# Patient Record
Sex: FEMALE | Race: WHITE | NOT HISPANIC OR LATINO | Employment: PART TIME | URBAN - METROPOLITAN AREA
[De-identification: names, ages, dates, MRNs, and addresses within clinical notes are randomized per-mention and may not be internally consistent; named-entity substitution may affect disease eponyms.]

---

## 2017-05-10 ENCOUNTER — GENERIC CONVERSION - ENCOUNTER (OUTPATIENT)
Dept: OTHER | Facility: OTHER | Age: 62
End: 2017-05-10

## 2017-10-20 ENCOUNTER — GENERIC CONVERSION - ENCOUNTER (OUTPATIENT)
Dept: OTHER | Facility: OTHER | Age: 62
End: 2017-10-20

## 2017-10-20 DIAGNOSIS — E03.9 HYPOTHYROIDISM: ICD-10-CM

## 2018-01-09 ENCOUNTER — TRANSCRIBE ORDERS (OUTPATIENT)
Dept: ADMINISTRATIVE | Facility: HOSPITAL | Age: 63
End: 2018-01-09

## 2018-01-09 ENCOUNTER — GENERIC CONVERSION - ENCOUNTER (OUTPATIENT)
Dept: OTHER | Facility: OTHER | Age: 63
End: 2018-01-09

## 2018-01-09 ENCOUNTER — APPOINTMENT (OUTPATIENT)
Dept: LAB | Facility: CLINIC | Age: 63
End: 2018-01-09
Payer: COMMERCIAL

## 2018-01-09 DIAGNOSIS — I51.9 MYXEDEMA HEART DISEASE: Primary | ICD-10-CM

## 2018-01-09 DIAGNOSIS — E03.9 MYXEDEMA HEART DISEASE: ICD-10-CM

## 2018-01-09 DIAGNOSIS — E03.9 MYXEDEMA HEART DISEASE: Primary | ICD-10-CM

## 2018-01-09 DIAGNOSIS — I51.9 MYXEDEMA HEART DISEASE: ICD-10-CM

## 2018-01-09 DIAGNOSIS — E03.9 HYPOTHYROIDISM: ICD-10-CM

## 2018-01-09 LAB
ALBUMIN SERPL BCP-MCNC: 3.9 G/DL (ref 3.5–5)
ALP SERPL-CCNC: 85 U/L (ref 46–116)
ALT SERPL W P-5'-P-CCNC: 15 U/L (ref 12–78)
ANION GAP SERPL CALCULATED.3IONS-SCNC: 9 MMOL/L (ref 4–13)
AST SERPL W P-5'-P-CCNC: 13 U/L (ref 5–45)
BILIRUB SERPL-MCNC: 0.5 MG/DL (ref 0.2–1)
BUN SERPL-MCNC: 16 MG/DL (ref 5–25)
CALCIUM SERPL-MCNC: 9.1 MG/DL (ref 8.3–10.1)
CHLORIDE SERPL-SCNC: 107 MMOL/L (ref 100–108)
CO2 SERPL-SCNC: 24 MMOL/L (ref 21–32)
CREAT SERPL-MCNC: 0.76 MG/DL (ref 0.6–1.3)
ERYTHROCYTE [DISTWIDTH] IN BLOOD BY AUTOMATED COUNT: 12.9 % (ref 11.6–15.1)
GFR SERPL CREATININE-BSD FRML MDRD: 86 ML/MIN/1.73SQ M
GLUCOSE P FAST SERPL-MCNC: 112 MG/DL (ref 65–99)
HCT VFR BLD AUTO: 39.7 % (ref 34.8–46.1)
HGB BLD-MCNC: 13.9 G/DL (ref 11.5–15.4)
MCH RBC QN AUTO: 33.3 PG (ref 26.8–34.3)
MCHC RBC AUTO-ENTMCNC: 35 G/DL (ref 31.4–37.4)
MCV RBC AUTO: 95 FL (ref 82–98)
PLATELET # BLD AUTO: 261 THOUSANDS/UL (ref 149–390)
PMV BLD AUTO: 10.2 FL (ref 8.9–12.7)
POTASSIUM SERPL-SCNC: 3.8 MMOL/L (ref 3.5–5.3)
PROT SERPL-MCNC: 7.5 G/DL (ref 6.4–8.2)
RBC # BLD AUTO: 4.17 MILLION/UL (ref 3.81–5.12)
SODIUM SERPL-SCNC: 140 MMOL/L (ref 136–145)
T3FREE SERPL-MCNC: 2.64 PG/ML (ref 2.3–4.2)
T4 FREE SERPL-MCNC: 0.83 NG/DL (ref 0.76–1.46)
T4 SERPL-MCNC: 7.7 UG/DL (ref 4.7–13.3)
TSH SERPL DL<=0.05 MIU/L-ACNC: 2.15 UIU/ML (ref 0.36–3.74)
WBC # BLD AUTO: 7.35 THOUSAND/UL (ref 4.31–10.16)

## 2018-01-09 PROCEDURE — 84439 ASSAY OF FREE THYROXINE: CPT

## 2018-01-09 PROCEDURE — 84480 ASSAY TRIIODOTHYRONINE (T3): CPT

## 2018-01-09 PROCEDURE — 84481 FREE ASSAY (FT-3): CPT

## 2018-01-09 PROCEDURE — 36415 COLL VENOUS BLD VENIPUNCTURE: CPT

## 2018-01-09 PROCEDURE — 85027 COMPLETE CBC AUTOMATED: CPT

## 2018-01-09 PROCEDURE — 84443 ASSAY THYROID STIM HORMONE: CPT

## 2018-01-09 PROCEDURE — 80053 COMPREHEN METABOLIC PANEL: CPT

## 2018-01-10 ENCOUNTER — GENERIC CONVERSION - ENCOUNTER (OUTPATIENT)
Dept: OTHER | Facility: OTHER | Age: 63
End: 2018-01-10

## 2018-01-10 LAB — T3 SERPL-MCNC: 0.9 NG/ML (ref 0.6–1.8)

## 2018-01-22 VITALS
HEART RATE: 83 BPM | HEIGHT: 63 IN | DIASTOLIC BLOOD PRESSURE: 74 MMHG | RESPIRATION RATE: 16 BRPM | WEIGHT: 112 LBS | BODY MASS INDEX: 19.84 KG/M2 | SYSTOLIC BLOOD PRESSURE: 120 MMHG | OXYGEN SATURATION: 97 %

## 2018-01-24 VITALS
WEIGHT: 117 LBS | DIASTOLIC BLOOD PRESSURE: 60 MMHG | SYSTOLIC BLOOD PRESSURE: 110 MMHG | BODY MASS INDEX: 20.73 KG/M2 | HEIGHT: 63 IN

## 2018-02-15 ENCOUNTER — OFFICE VISIT (OUTPATIENT)
Dept: URGENT CARE | Facility: CLINIC | Age: 63
End: 2018-02-15
Payer: COMMERCIAL

## 2018-02-15 ENCOUNTER — HOSPITAL ENCOUNTER (INPATIENT)
Facility: HOSPITAL | Age: 63
LOS: 4 days | Discharge: NURSING, REHAB OR LONG-TERM CARE | DRG: 536 | End: 2018-02-20
Attending: EMERGENCY MEDICINE | Admitting: INTERNAL MEDICINE
Payer: COMMERCIAL

## 2018-02-15 ENCOUNTER — APPOINTMENT (OUTPATIENT)
Dept: RADIOLOGY | Facility: CLINIC | Age: 63
End: 2018-02-15
Payer: COMMERCIAL

## 2018-02-15 ENCOUNTER — APPOINTMENT (EMERGENCY)
Dept: CT IMAGING | Facility: HOSPITAL | Age: 63
DRG: 536 | End: 2018-02-15
Payer: COMMERCIAL

## 2018-02-15 VITALS
SYSTOLIC BLOOD PRESSURE: 130 MMHG | WEIGHT: 114 LBS | OXYGEN SATURATION: 98 % | HEART RATE: 92 BPM | RESPIRATION RATE: 20 BRPM | BODY MASS INDEX: 18.32 KG/M2 | HEIGHT: 66 IN | TEMPERATURE: 97.9 F | DIASTOLIC BLOOD PRESSURE: 70 MMHG

## 2018-02-15 DIAGNOSIS — S32.691A: ICD-10-CM

## 2018-02-15 DIAGNOSIS — S32.9XXA PELVIC FRACTURE (HCC): Primary | ICD-10-CM

## 2018-02-15 DIAGNOSIS — M25.559 ARTHRALGIA OF HIP, UNSPECIFIED LATERALITY: Primary | ICD-10-CM

## 2018-02-15 DIAGNOSIS — R35.0 URINARY FREQUENCY: ICD-10-CM

## 2018-02-15 DIAGNOSIS — F32.A DEPRESSION: ICD-10-CM

## 2018-02-15 DIAGNOSIS — K59.00 CONSTIPATION: ICD-10-CM

## 2018-02-15 DIAGNOSIS — M25.559 ARTHRALGIA OF HIP, UNSPECIFIED LATERALITY: ICD-10-CM

## 2018-02-15 DIAGNOSIS — S32.591D CLOSED FRACTURE OF MULTIPLE RAMI OF RIGHT PUBIS WITH ROUTINE HEALING, SUBSEQUENT ENCOUNTER: ICD-10-CM

## 2018-02-15 PROBLEM — S32.599A FRACTURE OF MULTIPLE PUBIC RAMI (HCC): Status: ACTIVE | Noted: 2018-02-15

## 2018-02-15 PROBLEM — E03.9 HYPOTHYROID: Status: ACTIVE | Noted: 2018-02-15

## 2018-02-15 PROBLEM — Z85.3 HX OF BREAST CANCER: Status: ACTIVE | Noted: 2018-02-15

## 2018-02-15 LAB
ANION GAP SERPL CALCULATED.3IONS-SCNC: 8 MMOL/L (ref 4–13)
APTT PPP: 30 SECONDS (ref 23–35)
BASOPHILS # BLD AUTO: 0.03 THOUSANDS/ΜL (ref 0–0.1)
BASOPHILS NFR BLD AUTO: 0 % (ref 0–1)
BUN SERPL-MCNC: 13 MG/DL (ref 5–25)
CALCIUM SERPL-MCNC: 8.9 MG/DL (ref 8.3–10.1)
CHLORIDE SERPL-SCNC: 104 MMOL/L (ref 100–108)
CO2 SERPL-SCNC: 27 MMOL/L (ref 21–32)
CREAT SERPL-MCNC: 0.67 MG/DL (ref 0.6–1.3)
EOSINOPHIL # BLD AUTO: 0.06 THOUSAND/ΜL (ref 0–0.61)
EOSINOPHIL NFR BLD AUTO: 1 % (ref 0–6)
ERYTHROCYTE [DISTWIDTH] IN BLOOD BY AUTOMATED COUNT: 12.9 % (ref 11.6–15.1)
GFR SERPL CREATININE-BSD FRML MDRD: 96 ML/MIN/1.73SQ M
GLUCOSE SERPL-MCNC: 77 MG/DL (ref 65–140)
HCT VFR BLD AUTO: 40.2 % (ref 34.8–46.1)
HGB BLD-MCNC: 14 G/DL (ref 11.5–15.4)
INR PPP: 1 (ref 0.86–1.16)
LYMPHOCYTES # BLD AUTO: 1.56 THOUSANDS/ΜL (ref 0.6–4.47)
LYMPHOCYTES NFR BLD AUTO: 14 % (ref 14–44)
MCH RBC QN AUTO: 33.7 PG (ref 26.8–34.3)
MCHC RBC AUTO-ENTMCNC: 34.8 G/DL (ref 31.4–37.4)
MCV RBC AUTO: 97 FL (ref 82–98)
MONOCYTES # BLD AUTO: 0.78 THOUSAND/ΜL (ref 0.17–1.22)
MONOCYTES NFR BLD AUTO: 7 % (ref 4–12)
NEUTROPHILS # BLD AUTO: 8.58 THOUSANDS/ΜL (ref 1.85–7.62)
NEUTS SEG NFR BLD AUTO: 78 % (ref 43–75)
NRBC BLD AUTO-RTO: 0 /100 WBCS
PLATELET # BLD AUTO: 203 THOUSANDS/UL (ref 149–390)
PMV BLD AUTO: 10.2 FL (ref 8.9–12.7)
POTASSIUM SERPL-SCNC: 4.1 MMOL/L (ref 3.5–5.3)
PROTHROMBIN TIME: 13.2 SECONDS (ref 12.1–14.4)
RBC # BLD AUTO: 4.16 MILLION/UL (ref 3.81–5.12)
SODIUM SERPL-SCNC: 139 MMOL/L (ref 136–145)
WBC # BLD AUTO: 11.01 THOUSAND/UL (ref 4.31–10.16)

## 2018-02-15 PROCEDURE — 36415 COLL VENOUS BLD VENIPUNCTURE: CPT | Performed by: EMERGENCY MEDICINE

## 2018-02-15 PROCEDURE — 80048 BASIC METABOLIC PNL TOTAL CA: CPT | Performed by: EMERGENCY MEDICINE

## 2018-02-15 PROCEDURE — 99203 OFFICE O/P NEW LOW 30 MIN: CPT | Performed by: PHYSICIAN ASSISTANT

## 2018-02-15 PROCEDURE — 99285 EMERGENCY DEPT VISIT HI MDM: CPT

## 2018-02-15 PROCEDURE — 96374 THER/PROPH/DIAG INJ IV PUSH: CPT

## 2018-02-15 PROCEDURE — 99220 PR INITIAL OBSERVATION CARE/DAY 70 MINUTES: CPT | Performed by: PHYSICIAN ASSISTANT

## 2018-02-15 PROCEDURE — 72192 CT PELVIS W/O DYE: CPT

## 2018-02-15 PROCEDURE — 96375 TX/PRO/DX INJ NEW DRUG ADDON: CPT

## 2018-02-15 PROCEDURE — 85730 THROMBOPLASTIN TIME PARTIAL: CPT | Performed by: EMERGENCY MEDICINE

## 2018-02-15 PROCEDURE — S9088 SERVICES PROVIDED IN URGENT: HCPCS | Performed by: PHYSICIAN ASSISTANT

## 2018-02-15 PROCEDURE — 85610 PROTHROMBIN TIME: CPT | Performed by: EMERGENCY MEDICINE

## 2018-02-15 PROCEDURE — 73502 X-RAY EXAM HIP UNI 2-3 VIEWS: CPT

## 2018-02-15 PROCEDURE — 85025 COMPLETE CBC W/AUTO DIFF WBC: CPT | Performed by: EMERGENCY MEDICINE

## 2018-02-15 RX ORDER — DIAZEPAM 5 MG/1
5 TABLET ORAL EVERY 12 HOURS PRN
COMMUNITY
End: 2018-04-03 | Stop reason: SDUPTHER

## 2018-02-15 RX ORDER — LIOTHYRONINE SODIUM 5 UG/1
5 TABLET ORAL
Status: DISCONTINUED | OUTPATIENT
Start: 2018-02-16 | End: 2018-02-20 | Stop reason: HOSPADM

## 2018-02-15 RX ORDER — DIAZEPAM 5 MG/1
5 TABLET ORAL EVERY 12 HOURS PRN
Status: DISCONTINUED | OUTPATIENT
Start: 2018-02-15 | End: 2018-02-20 | Stop reason: HOSPADM

## 2018-02-15 RX ORDER — ACETAMINOPHEN 325 MG/1
650 TABLET ORAL EVERY 6 HOURS PRN
Status: DISCONTINUED | OUTPATIENT
Start: 2018-02-15 | End: 2018-02-20 | Stop reason: HOSPADM

## 2018-02-15 RX ORDER — TEMAZEPAM 22.5 MG/1
22.5 CAPSULE ORAL
COMMUNITY
End: 2018-04-11

## 2018-02-15 RX ORDER — FENTANYL CITRATE 50 UG/ML
100 INJECTION, SOLUTION INTRAMUSCULAR; INTRAVENOUS ONCE
Status: COMPLETED | OUTPATIENT
Start: 2018-02-15 | End: 2018-02-15

## 2018-02-15 RX ORDER — KETOROLAC TROMETHAMINE 30 MG/ML
15 INJECTION, SOLUTION INTRAMUSCULAR; INTRAVENOUS ONCE
Status: COMPLETED | OUTPATIENT
Start: 2018-02-15 | End: 2018-02-15

## 2018-02-15 RX ORDER — BUPROPION HYDROCHLORIDE 100 MG/1
100 TABLET ORAL DAILY
Status: DISCONTINUED | OUTPATIENT
Start: 2018-02-16 | End: 2018-02-20 | Stop reason: HOSPADM

## 2018-02-15 RX ORDER — ONDANSETRON 2 MG/ML
4 INJECTION INTRAMUSCULAR; INTRAVENOUS EVERY 6 HOURS PRN
Status: DISCONTINUED | OUTPATIENT
Start: 2018-02-15 | End: 2018-02-16

## 2018-02-15 RX ORDER — OXYCODONE HYDROCHLORIDE 5 MG/1
5 TABLET ORAL EVERY 4 HOURS PRN
Status: DISCONTINUED | OUTPATIENT
Start: 2018-02-15 | End: 2018-02-20 | Stop reason: HOSPADM

## 2018-02-15 RX ORDER — TEMAZEPAM 15 MG/1
15 CAPSULE ORAL
Status: DISCONTINUED | OUTPATIENT
Start: 2018-02-15 | End: 2018-02-20 | Stop reason: HOSPADM

## 2018-02-15 RX ORDER — LEVOTHYROXINE SODIUM 0.05 MG/1
50 TABLET ORAL DAILY
COMMUNITY
End: 2018-04-11 | Stop reason: SDUPTHER

## 2018-02-15 RX ORDER — LIOTHYRONINE SODIUM 5 UG/1
5 TABLET ORAL DAILY
COMMUNITY
End: 2018-04-11 | Stop reason: SDUPTHER

## 2018-02-15 RX ORDER — LEVOTHYROXINE SODIUM 0.05 MG/1
50 TABLET ORAL
Status: DISCONTINUED | OUTPATIENT
Start: 2018-02-16 | End: 2018-02-20 | Stop reason: HOSPADM

## 2018-02-15 RX ADMIN — ONDANSETRON 4 MG: 2 INJECTION INTRAMUSCULAR; INTRAVENOUS at 22:13

## 2018-02-15 RX ADMIN — TEMAZEPAM 15 MG: 15 CAPSULE ORAL at 23:05

## 2018-02-15 RX ADMIN — FENTANYL CITRATE 100 MCG: 50 INJECTION INTRAMUSCULAR; INTRAVENOUS at 17:24

## 2018-02-15 RX ADMIN — KETOROLAC TROMETHAMINE 15 MG: 30 INJECTION, SOLUTION INTRAMUSCULAR at 17:23

## 2018-02-15 RX ADMIN — HYDROMORPHONE HYDROCHLORIDE 1 MG: 1 INJECTION, SOLUTION INTRAMUSCULAR; INTRAVENOUS; SUBCUTANEOUS at 19:42

## 2018-02-15 RX ADMIN — OXYCODONE HYDROCHLORIDE 5 MG: 5 TABLET ORAL at 23:03

## 2018-02-16 PROCEDURE — 97163 PT EVAL HIGH COMPLEX 45 MIN: CPT

## 2018-02-16 PROCEDURE — 99232 SBSQ HOSP IP/OBS MODERATE 35: CPT | Performed by: INTERNAL MEDICINE

## 2018-02-16 PROCEDURE — G8978 MOBILITY CURRENT STATUS: HCPCS

## 2018-02-16 PROCEDURE — G8987 SELF CARE CURRENT STATUS: HCPCS

## 2018-02-16 PROCEDURE — G8988 SELF CARE GOAL STATUS: HCPCS

## 2018-02-16 PROCEDURE — G8979 MOBILITY GOAL STATUS: HCPCS

## 2018-02-16 PROCEDURE — 97167 OT EVAL HIGH COMPLEX 60 MIN: CPT

## 2018-02-16 PROCEDURE — 99222 1ST HOSP IP/OBS MODERATE 55: CPT | Performed by: PHYSICIAN ASSISTANT

## 2018-02-16 RX ORDER — PROMETHAZINE HYDROCHLORIDE 25 MG/ML
12.5 INJECTION, SOLUTION INTRAMUSCULAR; INTRAVENOUS EVERY 4 HOURS PRN
Status: DISCONTINUED | OUTPATIENT
Start: 2018-02-16 | End: 2018-02-20 | Stop reason: HOSPADM

## 2018-02-16 RX ORDER — SODIUM CHLORIDE AND POTASSIUM CHLORIDE .9; .15 G/100ML; G/100ML
100 SOLUTION INTRAVENOUS CONTINUOUS
Status: DISCONTINUED | OUTPATIENT
Start: 2018-02-16 | End: 2018-02-17

## 2018-02-16 RX ADMIN — HYDROMORPHONE HYDROCHLORIDE 1 MG: 1 INJECTION, SOLUTION INTRAMUSCULAR; INTRAVENOUS; SUBCUTANEOUS at 08:13

## 2018-02-16 RX ADMIN — ONDANSETRON 4 MG: 2 INJECTION INTRAMUSCULAR; INTRAVENOUS at 08:17

## 2018-02-16 RX ADMIN — OXYCODONE HYDROCHLORIDE 5 MG: 5 TABLET ORAL at 18:14

## 2018-02-16 RX ADMIN — HYDROMORPHONE HYDROCHLORIDE 1 MG: 1 INJECTION, SOLUTION INTRAMUSCULAR; INTRAVENOUS; SUBCUTANEOUS at 20:18

## 2018-02-16 RX ADMIN — PROMETHAZINE HYDROCHLORIDE 12.5 MG: 25 INJECTION INTRAMUSCULAR; INTRAVENOUS at 20:21

## 2018-02-16 RX ADMIN — LEVOTHYROXINE SODIUM 50 MCG: 50 TABLET ORAL at 05:43

## 2018-02-16 RX ADMIN — LIOTHYRONINE SODIUM 5 MCG: 5 TABLET ORAL at 05:43

## 2018-02-16 RX ADMIN — HYDROMORPHONE HYDROCHLORIDE 1 MG: 1 INJECTION, SOLUTION INTRAMUSCULAR; INTRAVENOUS; SUBCUTANEOUS at 03:00

## 2018-02-16 RX ADMIN — OXYCODONE HYDROCHLORIDE 5 MG: 5 TABLET ORAL at 12:42

## 2018-02-16 RX ADMIN — ENOXAPARIN SODIUM 40 MG: 40 INJECTION SUBCUTANEOUS at 08:06

## 2018-02-16 RX ADMIN — OXYCODONE HYDROCHLORIDE 5 MG: 5 TABLET ORAL at 05:46

## 2018-02-16 RX ADMIN — SODIUM CHLORIDE AND POTASSIUM CHLORIDE 100 ML/HR: .9; .15 SOLUTION INTRAVENOUS at 12:42

## 2018-02-16 RX ADMIN — DIAZEPAM 5 MG: 5 TABLET ORAL at 10:52

## 2018-02-16 RX ADMIN — BUPROPION HYDROCHLORIDE 100 MG: 100 TABLET, FILM COATED ORAL at 08:06

## 2018-02-16 RX ADMIN — PROMETHAZINE HYDROCHLORIDE 12.5 MG: 25 INJECTION INTRAMUSCULAR; INTRAVENOUS at 11:15

## 2018-02-16 NOTE — PLAN OF CARE
MUSCULOSKELETAL - ADULT     Maintain or return mobility to safest level of function Progressing     Maintain proper alignment of affected body part Progressing        PAIN - ADULT     Verbalizes/displays adequate comfort level or baseline comfort level Progressing        Potential for Falls     Patient will remain free of falls Progressing        Prexisting or High Potential for Compromised Skin Integrity     Skin integrity is maintained or improved Progressing

## 2018-02-16 NOTE — PROGRESS NOTES
[facility] Progress Note  Patient: Saintclair Skene 63 y o  female   MRN: [id number]  PCP: Yury Sheehan DO  Unit/Bed#: E2 -01 Encounter: [id number]  Date Of Visit: 18    Assessment:    Principal Problem:    Fracture of multiple pubic rami ([address] )  Active Problems:    Hx of breast cancer    Depression    Hypothyroid      Plan:    · Fracture of multiple pubic rami with CT showing right superior and inferior pubic rami fractures after mechanical fall continued analgesia with oxycodone and Dilaudid for pain await PT/OT recommendation for home versus short-term Re have appreciate orthopedic input recommending weight-bearing as tolerated with walker  · History of breast cancer on estradiol and progesterone? · Hypothyroidism continue levothyroxine and liothyronine  · Depression continue Wellbutrin      VTE Pharmacologic Prophylaxis:   Pharmacologic: Enoxaparin (Lovenox)  Mechanical VTE Prophylaxis in Place: Yes    Discussions with Specialists or Other Care Team Provider: no    Time Spent for Care: 30 minutes  More than 50% of total time spent on counseling and coordination of care as described above  Subjective:   Pain mostly relieved with either Dilaudid or oxycodone but it has some nausea and poor appetite  Denies any shortness of breath chest pain  Awaiting PT/OT recommendations for rehab versus home PT    Objective:     Vitals:   Temp (24hrs), Av 6 °F (36 4 °C), Min:97 °F (36 1 °C), Max:98 °F (36 7 °C)    HR:  [69-98] 73  Resp:  [16-20] 18  BP: (117-168)/(58-87) 117/58  SpO2:  [97 %-100 %] 98 %  Body mass index is 18 4 kg/m²       Input and Output Summary (last 24 hours):     No intake or output data in the 24 hours ending 18 0917    Physical Exam:     Physical Exam:   General appearance: alert, appears stated age and cooperative  Head: Normocephalic, without obvious abnormality, atraumatic  Lungs: clear to auscultation bilaterally  Heart: regular rate and rhythm  Abdomen: soft, non-tender; bowel sounds normal; no masses,  no organomegaly  Back: negative  Extremities: extremities normal, atraumatic, no cyanosis or edema  Neurologic: Grossly normal      Additional Data:     Labs:      Results from last 7 days  Lab Units 02/15/18  1720   WBC Thousand/uL 11 01*   HEMOGLOBIN g/dL 14 0   HEMATOCRIT % 40 2   PLATELETS Thousands/uL 203   NEUTROS PCT % 78*   LYMPHS PCT % 14   MONOS PCT % 7   EOS PCT % 1       Results from last 7 days  Lab Units 02/15/18  1720   SODIUM mmol/L 139   POTASSIUM mmol/L 4 1   CHLORIDE mmol/L 104   CO2 mmol/L 27   BUN mg/dL 13   CREATININE mg/dL 0 67   CALCIUM mg/dL 8 9   GLUCOSE RANDOM mg/dL 77       Results from last 7 days  Lab Units 02/15/18  1720   INR  1 00       * I Have Reviewed All Lab Data Listed Above  * Additional Pertinent Lab Tests Reviewed: [facility]    Imaging:  Xr Hip/pelv 2-3 Vws Right    Addendum Date: 2/15/2018 Addendum:   Addendum: Nondisplaced right superior and inferior pubic ramus fractures are noted  Result Date: 2/15/2018  Narrative: RIGHT HIP INDICATION:  Right hip pain  COMPARISON: None VIEWS: AP pelvis and 2 coned down views of the hip IMAGES:  4 FINDINGS: There is no acute fracture or dislocation  No degenerative changes  No lytic or blastic lesions are seen  Soft tissues are unremarkable  Impression: No acute osseous abnormality  Workstation performed: [id number]     Ct Pelvis Wo Contrast    Result Date: 2/15/2018  Narrative: CT PELVIS WITHOUT IV CONTRAST INDICATION:  evaluate fracture  History taken directly from the electronic ordering system  COMPARISON:  X-ray same day  TECHNIQUE: CT examination of the pelvis was performed without intravenous contrastAxial, sagittal, and coronal 2D reformatted images were created from the source data and submitted for interpretation  Radiation dose length product (DLP) for this visit:     This examination, like all CT scans performed in the [facility], was performed utilizing techniques to minimize radiation dose exposure, including the use of iterative reconstruction  and automated exposure control  Enteric contrast was administered  FINDINGS: VISUALIZED KIDNEYS/URETERS:  No significant abnormality identified in the partially imaged kidneys and ureters  REPRODUCTIVE ORGANS:  Unremarkable for patient's age  URINARY BLADDER:  Unremarkable  APPENDIX:  No findings to suggest appendicitis  VISUALIZED BOWEL:  Unremarkable  ABDOMINOPELVIC CAVITY:  No ascites or free intraperitoneal air  No lymphadenopathy  VISUALIZED VESSELS:  Unremarkable for patient's age  ABDOMINOPELVIC WALL/INGUINAL REGIONS:  Unremarkable  OSSEOUS STRUCTURES:  Nondisplaced fractures of the right superior and inferior pubic rami are identified  Impression: Nondisplaced right superior and inferior pubic rami fractures  Workstation performed: [id number]     Imaging Reports Reviewed Today Include:  CT abdomen and pelvis  Imaging Personally Reviewed by Myself Includes:    Procedure: Xr Hip/pelv 2-3 Vws Right    Addendum Date: 2/15/2018 Addendum:   Addendum: Nondisplaced right superior and inferior pubic ramus fractures are noted  Result Date: 2/15/2018  Narrative: RIGHT HIP INDICATION:  Right hip pain  COMPARISON: None VIEWS: AP pelvis and 2 coned down views of the hip IMAGES:  4 FINDINGS: There is no acute fracture or dislocation  No degenerative changes  No lytic or blastic lesions are seen  Soft tissues are unremarkable  Impression: No acute osseous abnormality  Workstation performed: [id number]     Procedure: Ct Pelvis Wo Contrast    Result Date: 2/15/2018  Narrative: CT PELVIS WITHOUT IV CONTRAST INDICATION:  evaluate fracture  History taken directly from the electronic ordering system  COMPARISON:  X-ray same day   TECHNIQUE: CT examination of the pelvis was performed without intravenous contrastAxial, sagittal, and coronal 2D reformatted images were created from the source data and submitted for interpretation  Radiation dose length product (DLP) for this visit:   This examination, like all CT scans performed in the [facility], was performed utilizing techniques to minimize radiation dose exposure, including the use of iterative reconstruction  and automated exposure control  Enteric contrast was administered  FINDINGS: VISUALIZED KIDNEYS/URETERS:  No significant abnormality identified in the partially imaged kidneys and ureters  REPRODUCTIVE ORGANS:  Unremarkable for patient's age  URINARY BLADDER:  Unremarkable  APPENDIX:  No findings to suggest appendicitis  VISUALIZED BOWEL:  Unremarkable  ABDOMINOPELVIC CAVITY:  No ascites or free intraperitoneal air  No lymphadenopathy  VISUALIZED VESSELS:  Unremarkable for patient's age  ABDOMINOPELVIC WALL/INGUINAL REGIONS:  Unremarkable  OSSEOUS STRUCTURES:  Nondisplaced fractures of the right superior and inferior pubic rami are identified  Impression: Nondisplaced right superior and inferior pubic rami fractures   Workstation performed: [id number]        Recent Cultures (last 7 days):           Last 24 Hours Medication List:     Current Facility-Administered Medications:  acetaminophen 650 mg Oral Q6H PRN Liana Calvillo, PA-C   buPROPion 100 mg Oral Daily Liana Calvillo, PA-C   diazepam 5 mg Oral Q12H PRN Liana Rajinder, PA-C   enoxaparin 40 mg Subcutaneous Daily Liana Calvillo, PA-C   estradiol 1 patch Transdermal Weekly Liana Rajinder, PA-C   HYDROmorphone 1 mg Intravenous Q3H PRN Liana Rajinder, PA-C   levothyroxine 50 mcg Oral Early Morning Liana Rajinder, PA-C   liothyronine 5 mcg Oral Early Morning Liana Rajinder, PA-C   ondansetron 4 mg Intravenous Q6H PRN Liana Rajinder, PA-C   oxyCODONE 5 mg Oral Q4H PRN Liana Rajinder, PA-C   pneumococcal 13-valent conjugate vaccine 0 5 mL Intramuscular Prior to discharge Elvira Patel DO   progesterone 100 mg Oral BID Liana Rajinder, PA-C   temazepam 15 mg Oral HS Uma Taylor PA-C        Today, Patient Was Seen By: Clara Romero MD    ** Please Note: Dragon 360 Dictation voice to text software may have been used in the creation of this document   **

## 2018-02-16 NOTE — OCCUPATIONAL THERAPY NOTE
[address]  Evaluation     Patient Name: Morena Mojica  SJYHC'M Date: 2/16/2018  Problem List  Patient Active Problem List   Diagnosis    F[address]ure of multiple pubic rami [address] )    Hx of breast cancer    Depression    Hypothyroid     Past Medical History  Past Medical History:   Diagnosis Date    [facility] cancer ([facility] )     Depression     PTSD (post-traumatic stress disorder)      Past Surgical History  Past Surgical History:   Procedure Laterality Date    APPENDECTOMY      BREAST SURGERY      TONSILLECTOMY           02/16/18 1006   Note Type   Note type Eval only   Restrictions/Precautions   Weight Bearing Precautions Per Order Yes   RLE Weight Bearing Per Order WBAT   LLE Weight Bearing Per Order WBAT   Other Precautions WBS; Fall Risk;Pain;Multiple lines   Pain Assessment   Pain Assessment 0-10   Pain Score 6   Pain Type Acute pain   Pain Location Hip   Pain Orientation Right   Pain Descriptors Aching   Pain Frequency Constant/continuous   Pain Onset Ongoing   Clinical Progression Gradually improving   Effect of Pain on Daily Activities Increased pain w/ activity   Patient's Stated Pain Goal No pain   Hospital Pain Intervention(s) Repositioned; Ambulation/increased activity; Emotional support   Response to Interventions Tolerated   Home Living   Type of [address] [place] [address] Multi-level;Bed/bath upstairs;Stairs to enter with rails  ([address] [place])   Bathroom Shower/Tub Walk-in shower   Bathroom Toilet Standard   Bathroom Equipment Other (Comment)  (none per pt report)   [address] Other (Comment)  (none per pt report)   Additional Comments Pt reports living in three level home w/ spouse   Spouse works during the day, but is able to assist as needed    Prior Function   Level of [place] Independent with ADLs and functional mobility   Lives With WrayJazlyn Help From Family;Friend(s)   ADL Assistance Independent   IADLs Independent   Falls in the last 6 months 1 to 4   Vocational Full time employment   Comments Pt reports I w/ ADLs, IADLs, functional mobility/transfers PTA, 1 fall, (+) , FT employment, and no use of DME/AD PTA   Lifestyle   Autonomy Pt reports I w/ ADLs, IADLs, functional mobility/transfers PTA, 1 fall, (+) , FT employment, and no use of DME/AD PTA   Reciprocal Relationships Lives w/ spouse   Service to Others FT employment   Intrinsic Gratification Spending time w/ family and friends   Psychosocial   Psychosocial (WDL) WDL   ADL   Eating Assistance 6  Modified independent   Grooming Assistance 5  Supervision/Setup   UB Bathing Assistance 6  Modified Independent   LB Bathing Assistance 3  Moderate Assistance   UB Dressing Assistance 5  Supervision/Setup   LB Dressing Assistance 3  Moderate [address]  4  Minimal Assistance   Bed Mobility   Supine to Sit 3  Moderate assistance   Additional items Assist x 2; Increased time required;Verbal cues;LE management   Sit to Supine 3  Moderate assistance   Additional items Assist x 2;Bedrails; Increased time required;Verbal cues;LE management   Additional Comments Pt lying supine at end of session w/ call bell and phone within reach  All needs met and pt reports no further questions for OT at this time   Transfers   Sit to Stand 3  Moderate assistance   Additional items Assist x 2;Bedrails; Increased time required;Verbal cues   Stand to Sit 3  Moderate assistance   Additional items Assist x 2;Bedrails; Increased time required;Verbal cues   Additional Comments VCs for safe transfer techniques   Functional Mobility   Functional Mobility 3  Moderate assistance   Additional Comments Assist of 2   Additional items Rolling walker   Balance   Static Sitting Fair +   Dynamic Sitting Fair   Static Standing Fair -   Dynamic Standing Poor +   Ambulatory Poor +   Activity Tolerance   Activity Tolerance Patient limited by fatigue;Patient limited by pain   Nurse Made Aware Pt able to be seen per RN Fior Ray   RUE Assessment   RUE Assessment WFL   LUE Assessment   LUE Assessment WFL   Hand Function   Gross Motor Coordination Functional   Fine Motor Coordination Functional   Sensation   Light Touch No apparent deficits   Sharp/Dull No apparent deficits   Proprioception   Proprioception No apparent deficits   Vision - Complex Assessment   Acuity Able to read clock/calendar on wall without difficulty   Cognition   Overall Cognitive Status [facility]   Arousal/Participation Alert; Cooperative   Attention Within functional limits   Orientation Level Oriented X4   Memory Within functional limits   Following Commands Follows multistep commands without difficulty   Assessment   Limitation Decreased ADL status; Decreased endurance;Decreased self-care trans;Decreased high-level ADLs   Prognosis Good   Assessment Pt is a 63 y o  female seen for OT evaluation s/p adm to [facility] on 2/15/2018 w/ s/p fall down the steps at home, resulting in non-displaced fx of the inferior and superior pubic rami  Comorbidities affecting pts functional performance include a significant PMH of breast cancer, depression, and PTSD  Pt with active OT orders and activity orders for up w/ assistance  Pt lives with spouse in a three level home w/ 1 [place] and bed/bath on 2nd floor  At baseline, pt was I w/ ADLs, IADLs, and functional mobility/transfers, (+)drove, & required no use of DME/AD  Upon evaluation, pt currently requires Mod I for UB ADLs, Mod A for LB ADLs, Min A toileting, Mod A of 2 for bed mobility, and Mod A of 2 for functional mobility/transfers 2* the following deficits impacting occupational performance: weakness, decreased strength, decreased balance, decreased tolerance, increased pain and orthopedic restrictions   These impairments, as well at pts steps to enter environment, limited home support, difficulty performing ADLS and difficulty performing IADLS  limit pts ability to safely engage in all baseline areas of occupation, including grooming, bathing, dressing, toileting, functional mobility/transfers, community mobility, care of pets , laundry , house maintenance, meal prep, cleaning, social participation  and leisure activities   Pt scored overall 50/100 on the Barthel Index  Based on the aforementioned OT evaluation, functional performance deficits, and assessments, pt has been identified as a high complexity evaluation  Pt to continue to benefit from continued acute OT services during hospital stay to address defined deficits and to maximize level of functional independence in the following Occupational Performance areas: grooming, bathing/shower, toilet hygiene, dressing, socialization, health maintenance, functional mobility, community mobility, clothing management, cleaning, meal prep, household maintenance, care of pets and social participation  From OT standpoint, recommend STR upon D/C  OT will continue to follow pt 3-5x/wk to address the following goals to  w/in 7-10 days:   Goals   Patient Goals to get stronger   LTG Time Frame 7-10   Long Term Goal Please refer to LTGs listed below   Plan   Treatment Interventions ADL retraining;Functional transfer training;UE strengthening/ROM; Endurance training;Patient/family training;Equipment evaluation/education; Compensatory technique education;Continued evaluation; Energy conservation; Activityengagement   Goal Expiration Date 18   Treatment Day 0   OT Frequency 3-5x/wk   Recommendation   OT Discharge Recommendation Short Term Rehab   OT - OK to Discharge Yes  (when medically cleared)   Barthel Index   Feeding 10   Bathing 0   Grooming Score 5   Dressing Score 5   Bladder Score 10   Bowels Score 10   Toilet Use Score 5   Transfers (Bed/Chair) Score 5   Mobility (Level Surface) Score 0   Stairs Score 0   Barthel Index Score 50   Modified [place] Scale   Modified [place] Scale 4       GOALS    1) Pt will improve activity tolerance to G for min 45 min txment sessions    2) Pt will improve bed mobility to Mod I w/o use of hospital features to progress to PLOF    3) Pt will complete UB/LB dressing/self care w/ mod I using adaptive device and DME as needed    4) Pt will complete bathing w/ Mod I w/ use of AE and DME as needed    5) Pt will complete toileting w/ mod I w/ G hygiene/thoroughness and G balance demonstrated during clothing management up/down using DME as needed    6) Pt will improve functional transfers to Mod I on/off all surfaces using DME as needed w/ G balance/safety     7) Pt will improve functional mobility during ADL/IADL/leisure tasks to Mod I using DME as needed w/ G balance/safety     8) Pt will participate in simulated IADL management task to increase independence to Mod I w/ G safety and endurance    9) Pt will demonstrate G carryover of pt/caregiver education and training as appropriate w/ mod I w/o cues w/ good tolerance    10) Pt will demonstrate 100% carryover of energy conservation techniques w/ mod I t/o functional I/ADL/leisure tasks w/o cues s/p skilled education    11) Pt will increase UE strength by 1/2 MM grade to increase independence in ADLs and transfers    Nathan Olson, OTR/L

## 2018-02-16 NOTE — CASE MANAGEMENT
Initial Clinical Review    Admission: Date/Time/Statement:   OBS  ORDER    2/15  @   1846    IP ORDER  ENTERED   2/16  @    1434      ED: Date/Time/Mode of Arrival:   ED Arrival Information     Expected Arrival Acuity Means of Arrival Escorted By Service Admission Type    2/15/2018 14:47 2/15/2018 15:46 Urgent Ambulance [facility] Urgent    Arrival Complaint    PELVIC FX          Chief Complaint:   Chief Complaint   Patient presents with    Hip Injury     Patient fell last night down multiple stairs in bathroom  Patient denies hitting head or LOC  Per EMS, patient went to [address] [place] [address] urgent care today where she had an x-ray that showed broken R hip  History of Illness:   Sharita Nails is a 63 y o  female who presents with right hip pain  She fell down a flight of wooden stairs at home last night at 1am  She states it was dark and she was going to the bathroom, she took a wrong turn and fell down the steps  She was unable to get up  Her  lifted her up and into bed  She did not hit her head  She denies any other injuries  She was not dizzy or lightheaded prior to or after her fall  No chest pain or shortness of breath       ED Vital Signs:   ED Triage Vitals [02/15/18 1556]   Temperature Pulse Respirations Blood Pressure SpO2   97 8 °F (36 6 °C) 90 16 168/71 99 %      Temp Source Heart Rate Source Patient Position - Orthostatic VS BP Location FiO2 (%)   Oral Monitor Lying Left arm --      Pain Score       6        Wt Readings from Last 1 Encounters:   02/15/18 51 7 kg (114 lb)       Vital Signs (abnormal):    above    Abnormal Labs/Diagnostic Test Results:    WBC   11 01  Abs  neutro  8 58  Ct  Pelvis:    Nondisplaced right superior and inferior pubic rami fractures      ED Treatment:   Medication Administration from 02/15/2018 1447 to 02/15/2018 2015       Date/Time Order Dose Route Action Action by Comments     02/15/2018 4763 ketorolac (TORADOL) injection 15 mg 15 mg Intravenous Given Nery Coronado RN      02/15/2018 1724 fentanyl citrate (PF) 100 MCG/2ML 100 mcg 100 mcg Intravenous Given Nery Coronado RN      02/15/2018 1942 HYDROmorphone (DILAUDID) injection 1 mg 1 mg Intravenous Given Nery Coronado RN           Past Medical/Surgical History: Active Ambulatory Problems     Diagnosis Date Noted    No Active Ambulatory Problems     Resolved Ambulatory Problems     Diagnosis Date Noted    No Resolved Ambulatory Problems     Past Medical History:   Diagnosis Date    Breast cancer ([facility] )     Depression     PTSD (post-traumatic stress disorder)        Admitting Diagnosis: Pelvic fracture ([facility] ) [S32  9XXA]  Pelvic pain [R10 2]    Age/Sex: 63 y o  female    · Assessment/Plan:   Right pubic rami fracture  ? Admit to med/surg  Continue IV pain medications  Consult ortho  Consult PT/OT/case management       Plan for Additional Problems:   · Depression- continue wellbutrin and valium  · Hypothyroid- on both levothyroxine and liothyronine at home, will continue     VTE Prophylaxis: Enoxaparin (Lovenox)  / sequential compression device   Code Status: full code  POLST: There is no POLST form on file for this patient (pre-hospital)     Anticipated Length of Stay:  Patient will be admitted on an Observation basis with an anticipated length of stay of  Less than 2 midnights     Justification for Hospital Stay: patient requires ortho consult/PT/OT consult    Admission Orders:   OBS  ORDER  2/15  @    1846                      IP ORDER   ENTERED  2/16  @  1434  Scheduled Meds:   Current Facility-Administered Medications:  acetaminophen 650 mg Oral Q6H PRN Mahogany Alvarez PA-C   buPROPion 100 mg Oral Daily Mahogany Alvarez PA-C   diazepam 5 mg Oral Q12H PRN Mahogany Alvarez PA-C   enoxaparin 40 mg Subcutaneous Daily Mahogany Alvarez PA-C   estradiol 1 patch Transdermal Weekly Mahogany Alvarez PA-C   HYDROmorphone 1 mg Intravenous Q3H PRN Mahogany Alvarez PA-C   levothyroxine 50 mcg Oral Early Morning Sarahi Davila PA-C   liothyronine 5 mcg Oral Early Morning Sarahi Davila PA-C   ondansetron 4 mg Intravenous Q6H PRN Sarahi Davila PA-C   oxyCODONE 5 mg Oral Q4H PRN Sarahi Davila PA-C   pneumococcal 13-valent conjugate vaccine 0 5 mL Intramuscular Prior to discharge Glorimelanie Morris, DO   progesterone 100 mg Oral BID Sarahi Davila PA-C   temazepam 15 mg Oral HS Sarahi Davila PA-C     Continuous Infusions:    PRN Meds:   acetaminophen    diazepam    HYDROmorphone    ondansetron    oxyCODONE    pneumococcal 13-valent conjugate vaccine     PT/OT  Cons  Ortho    IV  Dilaudid  Prn   (  x2  2/16  Thus far)  IV  zofran  Prn ( x1  2/16 thsu far)    Per  Ortho consult:  67yo female nondisplaced superior and inferior pubic rami fractures  Plan:  -PT/OT  - WBAT with walker  -pain control prn     IM PROGRESS  NOTE  2/16  · Fracture of multiple pubic rami with CT showing right superior and inferior pubic rami fractures after mechanical fall continued analgesia with oxycodone and Dilaudid for pain await PT/OT recommendation for home versus short-term Re have appreciate orthopedic input recommending weight-bearing as tolerated with walker  · History of breast cancer on estradiol and progesterone? · Hypothyroidism continue levothyroxine and liothyronine  · Depression continue Wellbutrin  -med mgt per SLIM    Thank you,  [facility] in the [facility] by Dominick Albarado for 2017  Network Utilization Review Department  Phone: [phone]; Fax [phone]  ATTENTION: The Network Utilization Review Department is now centralized for our 7 Facilities  Make a note that we have a new phone and fax numbers for our Department  Please call with any questions or concerns to [phone] and carefully follow the prompts so that you are directed to the right person   All voicemails are confidential  Fax any determinations, approvals, denials, and requests for initial or continue stay review clinical to [phone]  Due to HIGH CALL volume, it would be easier if you could please send faxed requests to expedite your requests and in part, help us provide discharge notifications faster

## 2018-02-16 NOTE — PLAN OF CARE
Problem: PHYSICAL THERAPY ADULT  Goal: Performs mobility at highest level of function for planned discharge setting  See evaluation for individualized goals  Treatment/Interventions: Functional transfer training, LE strengthening/ROM, Elevations, Therapeutic exercise, Endurance training, Patient/family training, Equipment eval/education, Bed mobility, Gait training, Spoke to nursing, OT  Equipment Recommended: Dave Castrejon       See flowsheet documentation for full assessment, interventions and recommendations  Prognosis: Good  Problem List: Decreased strength, Decreased range of motion, Decreased endurance, Impaired balance, Decreased mobility, Orthopedic restrictions, Pain  Assessment: Pt is 63 y o  female seen for PT evaluation s/p admit to [facility]  on 2/15/2018  Two pt identifiers were used to confirm  Pt presented s/p fall down the steps at home  Pt was admitted with a primary dx of: non displaced fx of the inferior and superior pubic rami  PT now consulted for assessment of mobility and d/c needs  Pts current co morbidities effecting treatment include: breast cancer, depression, PTSD, and personal factors including [place] home and steps to manage inside the home  Pts current clinical presentation is unstable/ unpredictable (high complexity) due to Ongoing medical management for primary dx, Increased reliance on more restrictive AD compared to baseline, Decreased activity tolerance compared to baseline, Fall risk, Increased assistance needed from caregiver at current time, Current WBS    Prior to admission, pt was I with ambulation in the home and community without the use of an AD   Upon evaluation, pt currently is requiring mod A for bed mobility; mod A for transfers and mod A for ambulation w/ RW   Pt denies any lightheadedness or dizziness with ambulation   Pt presents at PT eval functioning below baseline and currently w/ overall mobility deficits 2* to: BLE weakness, decreased ROM, impaired balance, decreased endurance, gait deviations, pain, decreased activity tolerance compared to baseline, fall risk, orthopedic restrictions  Pt currently at a fall risk 2* to impairments listed above  Pt will continue to benefit from skilled PT interventions to address stated impairments; to maximize functional mobility; for ongoing pt/ family training; and DME needs  At conclusion of PT session pt returned BTB with phone and call bell within reach  Pt denies any further questions at this time  PT is currently recommending rehab  Pt/ family agreeable to plan and goals as stated on evaluation  PT will continue to follow during hospital stay  Barriers to Discharge: Inaccessible home environment     Recommendation: Short-term skilled PT     PT - OK to Discharge: Yes (to rehab when medically cleared )    See flowsheet documentation for full assessment

## 2018-02-16 NOTE — CONSULTS
Consultation - Orthopedics   Jasmine Villa 63 y o  female MRN: [id number]  Unit/Bed#: E2 -01 Encounter: [id number]      Assessment/Plan     Assessment:  67yo female nondisplaced superior and inferior pubic rami fractures  Plan:  -PT/OT  - WBAT with walker  -pain control prn   -med mgt per SLIM      History of Present Illness   Physician Requesting Consult: Indira Wright DO  Reason for Consult / Principal Problem: superior and inferior pubic rami fracture   HPI: Jasmine Villa is a 63y o  year old female who presents with right hip after a fall yesterday morning  She states around 1am yesterday she got up to go to the bathroom  She states the bathroom is on the left and stairs on the right  She accidentally went to the right and fell down about 15 stairs  She has instant pain in the right hip and was unable to bear weight  Her  helped her up and she went back to bed  In the morning she lillian to an urgent care and the x-rays showed a pubic rami fracture  She then was transported to the hospital   She complains of pain in her right hip  The pain is located over her ischial tuberosity  The pain does not radiate  She denies any numbness or tingling in her legs  She denies pain elsewhere     Consults    ROS: see HPI, all other systems negative  Historical Information   Past Medical History:   Diagnosis Date    Breast cancer ([facility] [address] )     Depression     PTSD (post-traumatic stress disorder)      Past Surgical History:   Procedure Laterality Date    APPENDECTOMY      BREAST SURGERY      TONSILLECTOMY       Social History   History   Alcohol Use    Yes     Comment: socially     History   Drug Use No     History   Smoking Status    Never Smoker   Smokeless Tobacco    Never Used     Family History: History reviewed  No pertinent family history      Meds/Allergies   Prescriptions Prior to Admission   Medication    diazepam (VALIUM) 5 mg tablet    estradiol (CLIMARA) 0 025 mg/24 hr    levothyroxine 50 mcg tablet    liothyronine (CYTOMEL) 5 mcg tablet    progesterone (PROMETRIUM) 100 MG capsule    temazepam (RESTORIL) 22 5 MG capsule     Allergies   Allergen Reactions    Shellfish-Derived Products     Penicillins Rash       Objective   Vitals: Blood pressure 140/63, pulse 85, temperature 98 °F (36 7 °C), temperature source Temporal, resp  rate 16, weight 51 7 kg (114 lb), SpO2 97 %  ,Body mass index is 18 4 kg/m²  No intake or output data in the 24 hours ending 02/16/18 0726  No intake/output data recorded  Invasive Devices     Peripheral Intravenous Line            Peripheral IV 02/15/18 Left Antecubital less than 1 day                PE:  Gen:  Awake and alert  HEENT:  Hearing intact  Heart:  Regular rate  Lungs: no audible wheezing  GI: no abdominal distension    Ortho Exam   Sensation L4, L5, S1 intact  palpable pedal pulses  No ecchymosis  pain with ROM of right hip  EHL/AT/GS intact     Lab Results:   I have personally reviewed pertinent lab results  CBC:   Lab Results   Component Value Date    WBC 11 01 (H) 02/15/2018    HGB 14 0 02/15/2018    HCT 40 2 02/15/2018    MCV 97 02/15/2018     02/15/2018    MCH 33 7 02/15/2018    MCHC 34 8 02/15/2018    RDW 12 9 02/15/2018    MPV 10 2 02/15/2018    NRBC 0 02/15/2018     CMP:   Lab Results   Component Value Date     02/15/2018     02/15/2018    CO2 27 02/15/2018    ANIONGAP 8 02/15/2018    BUN 13 02/15/2018    CREATININE 0 67 02/15/2018    GLUCOSE 77 02/15/2018    CALCIUM 8 9 02/15/2018    EGFR 96 02/15/2018     Imaging Studies: I have personally reviewed pertinent reports  and I have personally reviewed pertinent films in PACS  X-ray right hip- superior and inferior pubic rami fracture nondisplaced  CT scan- superior and inferior pubic rami fracture nondisplaced    Code Status: Level 1 - Full Code  Advance Directive and Living Will:      Power of :    POLST:

## 2018-02-16 NOTE — PHYSICAL THERAPY NOTE
PHYSICAL THERAPY EVALUATION  NAME:  Marck Rodríguez  DATE: 02/16/18    AGE:   63 y o  Mrn:   [id number]  ADMIT DX:  Pelvic fracture ([facility] [address] ) [S32  9XXA]  Pelvic pain [R10 2]    Past Medical History:   Diagnosis Date    Breast cancer ([facility] [address] )     Depression     PTSD (post-traumatic stress disorder)        Past Surgical History:   Procedure Laterality Date    APPENDECTOMY      BREAST SURGERY      TONSILLECTOMY         Length Of Stay: 0    PHYSICAL THERAPY EVALUATION:      02/16/18 1007   Note Type   Note type Eval only   Pain Assessment   Pain Assessment 0-10   Pain Score 6   Pain Type Acute pain   Pain Location Hip   Pain Orientation Right   Pain Descriptors Aching   Pain Frequency Constant/continuous   Pain Onset Ongoing   Clinical Progression Gradually improving   Effect of Pain on Daily Activities increased pain with mobility    Patient's Stated Pain Goal No pain   Hospital Pain Intervention(s) Ambulation/increased activity;Repositioned   Response to Interventions tolerated    Home Living   Type of [address] [place] [address] Multi-level;Bed/bath upstairs;Stairs to enter with rails  (2 [place])   Home Equipment (none as per pt )   Additional Comments Pt reports living with  who is able to assist pt if needed    Prior Function   Level of [place] Independent with ADLs and functional mobility   Lives With Spouse   Receives Help From Family;Friend(s)   ADL Assistance Independent   Falls in the last 6 months 1 to 4   Comments Pt denies the use of an AD for ambulation PTA    Restrictions/Precautions   Weight Bearing Precautions Per Order Yes   RLE Weight Bearing Per Order WBAT   LLE Weight Bearing Per Order WBAT   Other Precautions WBS; Fall Risk;Multiple lines;Pain   General   Family/Caregiver Present No   Cognition   Overall Cognitive Status WFL   Arousal/Participation Alert   Orientation Level Oriented X4   Memory Within functional limits   Following Commands Follows all commands and directions without difficulty RUE Assessment   RUE Assessment WFL   LUE Assessment   LUE Assessment WFL   RLE Assessment   RLE Assessment X  (decreased AROM)   Strength RLE   RLE Overall Strength 4-/5   LLE Assessment   LLE Assessment WFL   Strength LLE   LLE Overall Strength 4+/5   Bed Mobility   Supine to Sit 3  Moderate assistance   Additional items Increased time required;Verbal cues; Assist x 2   Sit to Supine 3  Moderate assistance   Additional items Assist x 2; Increased time required;Verbal cues   Transfers   Sit to Stand 3  Moderate assistance   Additional items Assist x 2; Increased time required;Verbal cues   Stand to Sit 3  Moderate assistance   Additional items Assist x 2; Increased time required;Verbal cues   Additional Comments VC needed for hand placement and safety with transfers    Ambulation/Elevation   Gait pattern Excessively slow; Short stride; Foward flexed; Inconsistent aden   Gait Assistance 3  Moderate assist   Additional items Assist x 2   Assistive Device Rolling walker  (trial crutches next session)   Distance lateral side steps toward [facility]   Balance   Static Sitting Fair   Static Standing Poor +   Ambulatory Poor +   Endurance Deficit   Endurance Deficit Yes   Endurance Deficit Description fatigue and pain    Activity Tolerance   Activity Tolerance Patient limited by fatigue;Patient limited by pain   Nurse Made Aware pt able to be seen per Ky Lerma RN   Assessment   Prognosis Good   Problem List Decreased strength;Decreased range of motion;Decreased endurance; Impaired balance;Decreased mobility;Orthopedic restrictions;Pain   Assessment Pt is 63 y o  female seen for PT evaluation s/p admit to [facility] on 2/15/2018  Two pt identifiers were used to confirm  Pt presented s/p fall down the steps at home  Pt was admitted with a primary dx of: non displaced fx of the inferior and superior pubic rami  PT now consulted for assessment of mobility and d/c needs   Pts current co morbidities effecting treatment include: breast cancer, depression, PTSD, and personal factors including [place] home and steps to manage inside the home  Pts current clinical presentation is unstable/ unpredictable (high complexity) due to Ongoing medical management for primary dx, Increased reliance on more restrictive AD compared to baseline, Decreased activity tolerance compared to baseline, Fall risk, Increased assistance needed from caregiver at current time, Current WBS    Prior to admission, pt was I with ambulation in the home and community without the use of an AD   Upon evaluation, pt currently is requiring mod A for bed mobility; mod A for transfers and mod A for ambulation w/ RW   Pt denies any lightheadedness or dizziness with ambulation  Pt presents at PT eval functioning below baseline and currently w/ overall mobility deficits 2* to: BLE weakness, decreased ROM, impaired balance, decreased endurance, gait deviations, pain, decreased activity tolerance compared to baseline, fall risk, orthopedic restrictions  Pt currently at a fall risk 2* to impairments listed above  Pt will continue to benefit from skilled PT interventions to address stated impairments; to maximize functional mobility; for ongoing pt/ family training; and DME needs  At conclusion of PT session pt returned BTB with phone and call bell within reach  Pt denies any further questions at this time  PT is currently recommending rehab  Pt/ family agreeable to plan and goals as stated on evaluation  PT will continue to follow during hospital stay  Barriers to Discharge Inaccessible home environment   Goals   Patient Goals " to get better"   STG Expiration Date 02/26/18   Short Term Goal #1 In 10 days pt will complete: 1) Bed mobility skills with S  2) Functional transfers with S  3) Ambulate 100' using least restrictive AD with S without LOB and stable vitals   4) Stair training up/ down 1 step/s using rail/s with S  5) Improve balance grades to Good 6) Improve BLE strength by 1/2 grade  7) PT for ongoing pt and family education; DME needs and D/C planning to promote highest level of function in least restrictive environment  Plan   Treatment/Interventions Functional transfer training;LE strengthening/ROM; Elevations; Therapeutic exercise; Endurance training;Patient/family training;Equipment eval/education; Bed mobility;Gait training;Spoke to nursing;OT   PT Frequency 5x/wk; Weekend   Recommendation   Recommendation Short-term skilled PT   Equipment Recommended Walker;Crutches   PT - OK to Discharge Yes  (to rehab when medically cleared )   Modified [place] Scale   Modified [place] Scale 4   Barthel Index   Feeding 10   Bathing 0   Grooming Score 0   Dressing Score 5   Bladder Score 10   Bowels Score 10   Toilet Use Score 5   Transfers (Bed/Chair) Score 5   Mobility (Level Surface) Score 0   Stairs Score 0   Barthel Index Score 45   Anastacio Manjarrez, PT

## 2018-02-16 NOTE — H&P
History and Physical - [facility] Internal Medicine    Patient Information: Shalom Elmore 63 y o  female MRN: [id number]  Unit/Bed#: E2 -01 Encounter: [id number]  Admitting Physician: Angie Pham PA-C  PCP: Charleen Gandhi DO  Date of Admission:  02/15/18    Assessment/Plan:    Hospital Problem List:     Principal Problem:    Fracture of multiple pubic rami ([address] )  Active Problems:    Hx of breast cancer    Depression    Hypothyroid      Plan for the Primary Problem(s):  · Right pubic rami fracture  · Admit to med/surg  Continue IV pain medications  Consult ortho  Consult PT/OT/case management  Plan for Additional Problems:   · Depression- continue wellbutrin and valium  · Hypothyroid- on both levothyroxine and liothyronine at home, will continue    VTE Prophylaxis: Enoxaparin (Lovenox)  / sequential compression device   Code Status: full code  POLST: There is no POLST form on file for this patient (pre-hospital)    Anticipated Length of Stay:  Patient will be admitted on an Observation basis with an anticipated length of stay of  Less than 2 midnights  Justification for Hospital Stay: patient requires ortho consult/PT/OT consult    Total Time for Visit, including Counseling / Coordination of Care: 45 minutes  Greater than 50% of this total time spent on direct patient counseling and coordination of care  Chief Complaint:   Right hip pain    History of Present Illness:    Shalom Elmore is a 63 y o  female who presents with right hip pain  She fell down a flight of wooden stairs at home last night at 1am  She states it was dark and she was going to the bathroom, she took a wrong turn and fell down the steps  She was unable to get up  Her  lifted her up and into bed  She did not hit her head  She denies any other injuries  She was not dizzy or lightheaded prior to or after her fall   No chest pain or shortness of breath    Review of Systems:    Review of Systems   Constitutional: Positive for activity change  HENT: Negative  Eyes: Negative  Respiratory: Negative  Cardiovascular: Negative  Gastrointestinal: Negative  Endocrine: Negative  Genitourinary: Negative  Musculoskeletal: Positive for arthralgias and gait problem  Skin: Negative  Allergic/Immunologic: Negative  Hematological: Negative  Psychiatric/Behavioral: Negative  Past Medical and Surgical History:     Past Medical History:   Diagnosis Date    Breast cancer ([facility] [address] )     Depression     PTSD (post-traumatic stress disorder)        Past Surgical History:   Procedure Laterality Date    APPENDECTOMY      BREAST SURGERY      TONSILLECTOMY         Meds/Allergies:    Prior to Admission medications    Medication Sig Start Date End Date Taking? Authorizing Provider   diazepam (VALIUM) 5 mg tablet Take 5 mg by mouth every 12 (twelve) hours as needed for anxiety   Yes Historical Provider, MD   estradiol (CLIMARA) 0 025 mg/24 hr Place 1 patch on the skin once a week   Yes Historical Provider, MD   levothyroxine 50 mcg tablet Take 50 mcg by mouth daily   Yes Historical Provider, MD   liothyronine (CYTOMEL) 5 mcg tablet Take 5 mcg by mouth daily   Yes Historical Provider, MD   progesterone (PROMETRIUM) 100 MG capsule Take 100 mg by mouth 2 (two) times a day   Yes Historical Provider, MD   temazepam (RESTORIL) 22 5 MG capsule Take 15 mg by mouth daily at bedtime   Yes Historical Provider, MD     I have reviewed home medications with patient personally  Allergies:    Allergies   Allergen Reactions    Shellfish-Derived Products     Penicillins Rash       Social History:     Marital Status: /Civil Union   Occupation: works at jewelry store  Patient Pre-hospital Living Situation: lives with   Patient Pre-hospital Level of Mobility: ambulatory prior to fall  Patient Pre-hospital Diet Restrictions: none  Substance Use History:   History   Alcohol Use No     History   Smoking Status    Never Smoker Smokeless Tobacco    Never Used     History   Drug Use No       Family History:    non-contributory    Physical Exam:     Vitals:   Blood Pressure: 131/68 (02/15/18 2007)  Pulse: 94 (02/15/18 2007)  Temperature: 97 8 °F (36 6 °C) (02/15/18 1556)  Temp Source: Oral (02/15/18 1556)  Respirations: 16 (02/15/18 2007)  Weight - Scale: 51 7 kg (114 lb) (02/15/18 1556)  SpO2: 97 % (02/15/18 2007)    Physical Exam   Constitutional: She is oriented to person, place, and time  She appears well-developed and well-nourished  No distress  HENT:   Head: Normocephalic and atraumatic  Eyes: Conjunctivae are normal  Pupils are equal, round, and reactive to light  Neck: Normal range of motion  Neck supple  No JVD present  No tracheal deviation present  No thyromegaly present  Cardiovascular: Normal rate and regular rhythm  Pulmonary/Chest: Effort normal and breath sounds normal  No respiratory distress  She has no wheezes  Abdominal: Soft  Bowel sounds are normal  She exhibits no distension and no mass  There is no tenderness  There is no rebound and no guarding  Musculoskeletal:   Painful ROM right hip  No deformity  Lymphadenopathy:     She has no cervical adenopathy  Neurological: She is alert and oriented to person, place, and time  Skin: Skin is warm and dry  No rash noted  She is not diaphoretic  No erythema  No pallor  Small abrasion right elbow   Psychiatric: She has a normal mood and affect  Her behavior is normal    Vitals reviewed  Additional Data:     Lab Results: I have personally reviewed pertinent reports          Results from last 7 days  Lab Units 02/15/18  1720   WBC Thousand/uL 11 01*   HEMOGLOBIN g/dL 14 0   HEMATOCRIT % 40 2   PLATELETS Thousands/uL 203   NEUTROS PCT % 78*   LYMPHS PCT % 14   MONOS PCT % 7   EOS PCT % 1       Results from last 7 days  Lab Units 02/15/18  1720   SODIUM mmol/L 139   POTASSIUM mmol/L 4 1   CHLORIDE mmol/L 104   CO2 mmol/L 27   BUN mg/dL 13 CREATININE mg/dL 0 67   CALCIUM mg/dL 8 9   GLUCOSE RANDOM mg/dL 77       Results from last 7 days  Lab Units 02/15/18  1720   INR  1 00       Imaging: I have personally reviewed pertinent reports  Xr Hip/pelv 2-3 Vws Right    Addendum Date: 2/15/2018 Addendum:   Addendum: Nondisplaced right superior and inferior pubic ramus fractures are noted  Result Date: 2/15/2018  Narrative: RIGHT HIP INDICATION:  Right hip pain  COMPARISON: None VIEWS: AP pelvis and 2 coned down views of the hip IMAGES:  4 FINDINGS: There is no acute fracture or dislocation  No degenerative changes  No lytic or blastic lesions are seen  Soft tissues are unremarkable  Impression: No acute osseous abnormality  Workstation performed: [id number]     Ct Pelvis Wo Contrast    Result Date: 2/15/2018  Narrative: CT PELVIS WITHOUT IV CONTRAST INDICATION:  evaluate fracture  History taken directly from the electronic ordering system  COMPARISON:  X-ray same day  TECHNIQUE: CT examination of the pelvis was performed without intravenous contrastAxial, sagittal, and coronal 2D reformatted images were created from the source data and submitted for interpretation  Radiation dose length product (DLP) for this visit:   This examination, like all CT scans performed in the [facility], was performed utilizing techniques to minimize radiation dose exposure, including the use of iterative reconstruction  and automated exposure control  Enteric contrast was administered  FINDINGS: VISUALIZED KIDNEYS/URETERS:  No significant abnormality identified in the partially imaged kidneys and ureters  REPRODUCTIVE ORGANS:  Unremarkable for patient's age  URINARY BLADDER:  Unremarkable  APPENDIX:  No findings to suggest appendicitis  VISUALIZED BOWEL:  Unremarkable  ABDOMINOPELVIC CAVITY:  No ascites or free intraperitoneal air  No lymphadenopathy  VISUALIZED VESSELS:  Unremarkable for patient's age    ABDOMINOPELVIC WALL/INGUINAL REGIONS: Unremarkable  OSSEOUS STRUCTURES:  Nondisplaced fractures of the right superior and inferior pubic rami are identified  Impression: Nondisplaced right superior and inferior pubic rami fractures  Workstation performed: [id number]       EKG, Pathology, and Other Studies Reviewed on Admission:   · EKG: none    Allscripts / Epic Records Reviewed: Yes     ** Please Note: This note has been constructed using a voice recognition system   **

## 2018-02-16 NOTE — ED NOTES
Per pharmacy, we do not have patients estradiol patch or progesterone in the hospital  [place] is to be changed weekly which the patient normally changes on Sundays       Demond Barillas RN  02/15/18 2004

## 2018-02-16 NOTE — PLAN OF CARE
Problem: OCCUPATIONAL THERAPY ADULT  Goal: Performs self-care activities at highest level of function for planned discharge setting  See evaluation for individualized goals  Treatment Interventions: ADL retraining, Functional transfer training, UE strengthening/ROM, Endurance training, Patient/family training, Equipment evaluation/education, Compensatory technique education, Continued evaluation, Energy conservation, Activityengagement          See flowsheet documentation for full assessment, interventions and recommendations  Limitation: Decreased ADL status, Decreased endurance, Decreased self-care trans, Decreased high-level ADLs  Prognosis: Good  Assessment: Pt is a 63 y o  female seen for OT evaluation s/p adm [facility] w/ s/p fall down the steps at home, resulting in non-displaced fx of the inferior and superior pubic rami  Comorbidities affecting pts functional performance include a significant PMH of breast cancer, depression, and PTSD  Pt with active OT orders and activity orders for up w/ assistance  Pt lives with spouse in a three level home w/ 1 [place] and bed/bath on 2nd floor  At baseline, pt was I w/ ADLs, IADLs, and functional mobility/transfers, (+)drove, & required no use of DME/AD  Upon evaluation, pt currently requires Mod I for UB ADLs, Mod A for LB ADLs, Min A toileting, Mod A of 2 for bed mobility, and Mod A of 2 for functional mobility/transfers 2* the following deficits impacting occupational performance: weakness, decreased strength, decreased balance, decreased tolerance, increased pain and orthopedic restrictions   These impairments, as well at pts steps to enter environment, limited home support, difficulty performing ADLS and difficulty performing IADLS  limit pts ability to safely engage in all baseline areas of occupation, including grooming, bathing, dressing, toileting, functional mobility/transfers, community mobility, care of pets , laundry , house maintenance, meal prep, cleaning, social participation  and leisure activities   Pt scored overall 50/100 on the Barthel Index  Based on the aforementioned OT evaluation, functional performance deficits, and assessments, pt has been identified as a high complexity evaluation  Pt to continue to benefit from continued acute OT services during hospital stay to address defined deficits and to maximize level of functional independence in the following Occupational Performance areas: grooming, bathing/shower, toilet hygiene, dressing, socialization, health maintenance, functional mobility, community mobility, clothing management, cleaning, meal prep, household maintenance, care of pets and social participation  From OT standpoint, recommend STR upon D/C   OT will continue to follow pt 3-5x/wk to address the following goals to  w/in 7-10 days:     OT Discharge Recommendation: Short Term Rehab  OT - OK to Discharge: Yes (when medically cleared)

## 2018-02-17 LAB
ANION GAP SERPL CALCULATED.3IONS-SCNC: 8 MMOL/L (ref 4–13)
BUN SERPL-MCNC: 6 MG/DL (ref 5–25)
CALCIUM SERPL-MCNC: 7.7 MG/DL (ref 8.3–10.1)
CHLORIDE SERPL-SCNC: 103 MMOL/L (ref 100–108)
CO2 SERPL-SCNC: 26 MMOL/L (ref 21–32)
CREAT SERPL-MCNC: 0.66 MG/DL (ref 0.6–1.3)
GFR SERPL CREATININE-BSD FRML MDRD: 96 ML/MIN/1.73SQ M
GLUCOSE SERPL-MCNC: 100 MG/DL (ref 65–140)
POTASSIUM SERPL-SCNC: 3.8 MMOL/L (ref 3.5–5.3)
SODIUM SERPL-SCNC: 137 MMOL/L (ref 136–145)

## 2018-02-17 PROCEDURE — 80048 BASIC METABOLIC PNL TOTAL CA: CPT | Performed by: INTERNAL MEDICINE

## 2018-02-17 PROCEDURE — 97110 THERAPEUTIC EXERCISES: CPT

## 2018-02-17 PROCEDURE — 99232 SBSQ HOSP IP/OBS MODERATE 35: CPT | Performed by: INTERNAL MEDICINE

## 2018-02-17 PROCEDURE — 97530 THERAPEUTIC ACTIVITIES: CPT

## 2018-02-17 PROCEDURE — 97116 GAIT TRAINING THERAPY: CPT

## 2018-02-17 RX ADMIN — SODIUM CHLORIDE AND POTASSIUM CHLORIDE 100 ML/HR: .9; .15 SOLUTION INTRAVENOUS at 00:38

## 2018-02-17 RX ADMIN — ENOXAPARIN SODIUM 40 MG: 40 INJECTION SUBCUTANEOUS at 08:01

## 2018-02-17 RX ADMIN — DIAZEPAM 5 MG: 5 TABLET ORAL at 08:00

## 2018-02-17 RX ADMIN — HYDROMORPHONE HYDROCHLORIDE 1 MG: 1 INJECTION, SOLUTION INTRAMUSCULAR; INTRAVENOUS; SUBCUTANEOUS at 05:07

## 2018-02-17 RX ADMIN — TEMAZEPAM 15 MG: 15 CAPSULE ORAL at 21:42

## 2018-02-17 RX ADMIN — LEVOTHYROXINE SODIUM 50 MCG: 50 TABLET ORAL at 05:06

## 2018-02-17 RX ADMIN — PROGESTERONE 100 MG: 100 CAPSULE, LIQUID FILLED ORAL at 17:57

## 2018-02-17 RX ADMIN — PROMETHAZINE HYDROCHLORIDE 12.5 MG: 25 INJECTION INTRAMUSCULAR; INTRAVENOUS at 05:09

## 2018-02-17 RX ADMIN — LIOTHYRONINE SODIUM 5 MCG: 5 TABLET ORAL at 05:06

## 2018-02-17 RX ADMIN — BUPROPION HYDROCHLORIDE 100 MG: 100 TABLET, FILM COATED ORAL at 08:01

## 2018-02-17 RX ADMIN — TEMAZEPAM 15 MG: 15 CAPSULE ORAL at 00:44

## 2018-02-17 RX ADMIN — OXYCODONE HYDROCHLORIDE 5 MG: 5 TABLET ORAL at 17:55

## 2018-02-17 RX ADMIN — OXYCODONE HYDROCHLORIDE 5 MG: 5 TABLET ORAL at 08:01

## 2018-02-17 RX ADMIN — HYDROMORPHONE HYDROCHLORIDE 1 MG: 1 INJECTION, SOLUTION INTRAMUSCULAR; INTRAVENOUS; SUBCUTANEOUS at 12:18

## 2018-02-17 RX ADMIN — HYDROMORPHONE HYDROCHLORIDE 1 MG: 1 INJECTION, SOLUTION INTRAMUSCULAR; INTRAVENOUS; SUBCUTANEOUS at 21:42

## 2018-02-17 RX ADMIN — PROMETHAZINE HYDROCHLORIDE 12.5 MG: 25 INJECTION INTRAMUSCULAR; INTRAVENOUS at 21:42

## 2018-02-17 NOTE — SOCIAL WORK
Call from Ayana at [facility]  Physician will accept patient; Ayana starting process to submit for insurance auth; CM will follow up on Monday

## 2018-02-17 NOTE — PLAN OF CARE
Problem: PHYSICAL THERAPY ADULT  Goal: Performs mobility at highest level of function for planned discharge setting  See evaluation for individualized goals  Treatment/Interventions: Functional transfer training, LE strengthening/ROM, Elevations, Therapeutic exercise, Endurance training, Patient/family training, Equipment eval/education, Bed mobility, Gait training, Spoke to nursing, OT  Equipment Recommended: Ewelina Newsome       See flowsheet documentation for full assessment, interventions and recommendations  Outcome: Progressing  Prognosis: Fair  Problem List: Decreased strength, Decreased range of motion, Decreased endurance, Impaired balance, Decreased mobility, Decreased cognition, Decreased safety awareness, Pain, Orthopedic restrictions  Assessment: Pt  supine in bed upon my arrival requesting to use br  Pt  remains impulsive and at a decreased safety awareness throughout treatment session, with constant cues provided from therapist for correction/overt falls  Noted improvement in transfers, with less A provided by therapist  However, constant cues required for proper transfer techniques to be performed  At this time this therapist continues to recommend use of RW for increased balance support/due to pt's decreased safety awareness  Will continue to monitor for progression  As pt  stands noted pt  attempts to maintain NWB with RLE, requiring cueing of therapist to attempt [address]  Progressed with limited amb  trial to bedside commode for toileting  Able to complete self pericare  Continued with limited amb  trial to bedside chair, limited by pain/fatigue  Positioned seated in bedside chair with ice applied at end of treatment session and chair alarm active  PT will continue to recommend STR upon d/c for continued improvement of noted impairments above     Barriers to Discharge: Inaccessible home environment, Decreased caregiver support     Recommendation: Short-term skilled PT     PT - OK to Discharge: Yes (if d/c to STR when medically stable )    See flowsheet documentation for full assessment

## 2018-02-17 NOTE — PHYSICAL THERAPY NOTE
Physical Therapy Progress Note     02/17/18 1013   Pain Assessment   Pain Assessment 0-10   Pain Score 7   Pain Type Acute pain   Pain Location Hip   Pain Orientation Right   Hospital Pain Intervention(s) Cold applied; Ambulation/increased activity;Repositioned   Response to Interventions Tolerated  Restrictions/Precautions   Weight Bearing Precautions Per Order Yes   RLE Weight Bearing Per Order WBAT   LLE Weight Bearing Per Order WBAT   Other Precautions WBS; Fall Risk;Pain;Multiple lines;Cognitive; Chair Alarm   General   Chart Reviewed Yes   Response to Previous Treatment Patient reporting fatigue but able to participate  Family/Caregiver Present No   Subjective   Subjective Willing to participate in therapy this Am  Bed Mobility   Supine to Sit 3  Moderate assistance   Additional items Assist x 1;HOB elevated; Bedrails; Increased time required;Verbal cues;LE management;Leg    Transfers   Sit to Stand 3  Moderate assistance   Additional items Assist x 1;Bedrails; Increased time required;Verbal cues   Stand to Sit 3  Moderate assistance   Additional items Assist x 1; Armrests; Increased time required;Verbal cues   Ambulation/Elevation   Gait pattern Decreased foot clearance; Short stride; Excessively slow; Step to;Decreased R stance; Antalgic; Inconsistent aden   Gait Assistance 3  Moderate assist   Additional items Assist x 1;Verbal cues; Tactile cues; Other (Comment)  (with second present for line management/safety)   Assistive Device Rolling walker   Distance 10' x 2 with a seated resting period for toileting   Balance   Static Sitting Fair   Dynamic Sitting Fair   Static Standing Poor +   Dynamic Standing Poor   Ambulatory Poor   Endurance Deficit   Endurance Deficit Yes   Endurance Deficit Description fatigue/pain   Activity Tolerance   Activity Tolerance Patient limited by fatigue;Patient limited by pain   Nurse Made Aware Yes   Exercises   THR Sitting;10 reps;AAROM; Bilateral   Assessment   Prognosis Fair   Problem List Decreased strength;Decreased range of motion;Decreased endurance; Impaired balance;Decreased mobility; Decreased cognition;Decreased safety awareness;Pain;Orthopedic restrictions   Assessment Pt  supine in bed upon my arrival requesting to use br  Pt  remains impulsive and at a decreased safety awareness throughout treatment session, with constant cues provided from therapist for correction/overt falls  Noted improvement in transfers, with less A provided by therapist  However, constant cues required for proper transfer techniques to be performed  At this time this therapist continues to recommend use of RW for increased balance support/due to pt's decreased safety awareness  Will continue to monitor for progression  As pt  stands noted pt  attempts to maintain NWB with RLE, requiring cueing of therapist to attempt [address]  Progressed with limited amb  trial to bedside commode for toileting  Able to complete self pericare  Continued with limited amb  trial to bedside chair, limited by pain/fatigue  Positioned seated in bedside chair with ice applied at end of treatment session and chair alarm active  PT will continue to recommend STR upon d/c for continued improvement of noted impairments above  Barriers to Discharge Inaccessible home environment;Decreased caregiver support   Goals   Patient Goals None stated  STG Expiration Date 02/26/18   Treatment Day 1   Plan   Treatment/Interventions Functional transfer training;LE strengthening/ROM; Therapeutic exercise; Endurance training;Bed mobility;Gait training;Spoke to nursing;Spoke to case management   Progress Slow progress, decreased activity tolerance   PT Frequency 5x/wk; Weekend  (1x on wknd)   Recommendation   Recommendation Short-term skilled PT   Equipment Recommended Walker  (RW at this time, continue to monitor)   PT - OK to Discharge Yes  (if d/c to STR when medically stable )     Cassandra Lopez PTA

## 2018-02-17 NOTE — PROGRESS NOTES
[facility] Medicine Progress Note  Patient: Kenny Ng 63 y o  female   MRN: [id number]  PCP: Eddie Rodriguez DO  Unit/Bed#: E2 -01 Encounter: [id number]  Date Of Visit: 18    Assessment:    Principal Problem:    Fracture of multiple pubic rami ([address] )  Active Problems:    Hx of breast cancer    Depression    Hypothyroid      Plan:    · Fracture of multiple pubic rami with CT showing right superior and inferior pubic rami fractures after mechanical fall continued analgesia with oxycodone and Dilaudid for pain after PT/OT recommendation for  short-term rehab await disposition have appreciate orthopedic input recommending weight-bearing as tolerated with walker  · History of breast cancer on estradiol and progesterone? · Hypothyroidism continue levothyroxine and liothyronine  · Depression continue Wellbutrin  · Nausea and emesis with usage of narcotic analgesia oxycodone relieved with Phenergan Zofran was less effective add bowel regimen      VTE Pharmacologic Prophylaxis:   Pharmacologic: Enoxaparin (Lovenox)  Mechanical VTE Prophylaxis in Place: Yes    Discussions with Specialists or Other Care Team Provider: no    Time Spent for Care: 30 minutes  More than 50% of total time spent on counseling and coordination of care as described above  Subjective:   Pain mostly relieved with either Dilaudid or oxycodone but it has some nausea and poor appetite  Phenergan was much more effective than Zofran for this and is now eating  Denies any shortness of breath chest pain  PT/OT recommendations for rehab she is agreeable and awaiting disposition    Objective:     Vitals:   Temp (24hrs), Av °F (37 2 °C), Min:97 °F (36 1 °C), Max:100 3 °F (37 9 °C)    HR:  [95] 95  Resp:  [16-18] 18  BP: (119-125)/(62-74) 125/74  SpO2:  [91 %-97 %] 92 %  Body mass index is 18 4 kg/m²  Input and Output Summary (last 24 hours):        Intake/Output Summary (Last 24 hours) at 18 [id number]  Last data filed at 02/17/18 0806   Gross per 24 hour   Intake          1531 67 ml   Output             1500 ml   Net            31 67 ml       Physical Exam:     Physical Exam:   General appearance: alert, appears stated age and cooperative  Head: Normocephalic, without obvious abnormality, atraumatic  Lungs: clear to auscultation bilaterally  Heart: regular rate and rhythm  Abdomen: soft, non-tender; bowel sounds normal; no masses,  no organomegaly  Back: negative  Extremities: extremities normal, atraumatic, no cyanosis or edema  Neurologic: Grossly normal      Additional Data:     Labs:      Results from last 7 days  Lab Units 02/15/18  1720   WBC Thousand/uL 11 01*   HEMOGLOBIN g/dL 14 0   HEMATOCRIT % 40 2   PLATELETS Thousands/uL 203   NEUTROS PCT % 78*   LYMPHS PCT % 14   MONOS PCT % 7   EOS PCT % 1       Results from last 7 days  Lab Units 02/17/18  0559   SODIUM mmol/L 137   POTASSIUM mmol/L 3 8   CHLORIDE mmol/L 103   CO2 mmol/L 26   BUN mg/dL 6   CREATININE mg/dL 0 66   CALCIUM mg/dL 7 7*   GLUCOSE RANDOM mg/dL 100       Results from last 7 days  Lab Units 02/15/18  1720   INR  1 00       * I Have Reviewed All Lab Data Listed Above  * Additional Pertinent Lab Tests Reviewed: [facility]    Imaging:  Xr Hip/pelv 2-3 Vws Right    Addendum Date: 2/15/2018 Addendum:   Addendum: Nondisplaced right superior and inferior pubic ramus fractures are noted  Result Date: 2/15/2018  Narrative: RIGHT HIP INDICATION:  Right hip pain  COMPARISON: None VIEWS: AP pelvis and 2 coned down views of the hip IMAGES:  4 FINDINGS: There is no acute fracture or dislocation  No degenerative changes  No lytic or blastic lesions are seen  Soft tissues are unremarkable  Impression: No acute osseous abnormality  Workstation performed: [id number]     Ct Pelvis Wo Contrast    Result Date: 2/15/2018  Narrative: CT PELVIS WITHOUT IV CONTRAST INDICATION:  evaluate fracture   History taken directly from the electronic ordering system  COMPARISON:  X-ray same day  TECHNIQUE: CT examination of the pelvis was performed without intravenous contrastAxial, sagittal, and coronal 2D reformatted images were created from the source data and submitted for interpretation  Radiation dose length product (DLP) for this visit:   This examination, like all CT scans performed in the [facility], was performed utilizing techniques to minimize radiation dose exposure, including the use of iterative reconstruction  and automated exposure control  Enteric contrast was administered  FINDINGS: VISUALIZED KIDNEYS/URETERS:  No significant abnormality identified in the partially imaged kidneys and ureters  REPRODUCTIVE ORGANS:  Unremarkable for patient's age  URINARY BLADDER:  Unremarkable  APPENDIX:  No findings to suggest appendicitis  VISUALIZED BOWEL:  Unremarkable  ABDOMINOPELVIC CAVITY:  No ascites or free intraperitoneal air  No lymphadenopathy  VISUALIZED VESSELS:  Unremarkable for patient's age  ABDOMINOPELVIC WALL/INGUINAL REGIONS:  Unremarkable  OSSEOUS STRUCTURES:  Nondisplaced fractures of the right superior and inferior pubic rami are identified  Impression: Nondisplaced right superior and inferior pubic rami fractures  Workstation performed: [id number]     Imaging Reports Reviewed Today Include:  CT abdomen and pelvis  Imaging Personally Reviewed by Myself Includes:    Procedure: Xr Hip/pelv 2-3 Vws Right    Addendum Date: 2/15/2018 Addendum:   Addendum: Nondisplaced right superior and inferior pubic ramus fractures are noted  Result Date: 2/15/2018  Narrative: RIGHT HIP INDICATION:  Right hip pain  COMPARISON: None VIEWS: AP pelvis and 2 coned down views of the hip IMAGES:  4 FINDINGS: There is no acute fracture or dislocation  No degenerative changes  No lytic or blastic lesions are seen  Soft tissues are unremarkable  Impression: No acute osseous abnormality   Workstation performed: [id number] Procedure: Ct Pelvis Wo Contrast    Result Date: 2/15/2018  Narrative: CT PELVIS WITHOUT IV CONTRAST INDICATION:  evaluate fracture  History taken directly from the electronic ordering system  COMPARISON:  X-ray same day  TECHNIQUE: CT examination of the pelvis was performed without intravenous contrastAxial, sagittal, and coronal 2D reformatted images were created from the source data and submitted for interpretation  Radiation dose length product (DLP) for this visit:   This examination, like all CT scans performed in the [facility], was performed utilizing techniques to minimize radiation dose exposure, including the use of iterative reconstruction  and automated exposure control  Enteric contrast was administered  FINDINGS: VISUALIZED KIDNEYS/URETERS:  No significant abnormality identified in the partially imaged kidneys and ureters  REPRODUCTIVE ORGANS:  Unremarkable for patient's age  URINARY BLADDER:  Unremarkable  APPENDIX:  No findings to suggest appendicitis  VISUALIZED BOWEL:  Unremarkable  ABDOMINOPELVIC CAVITY:  No ascites or free intraperitoneal air  No lymphadenopathy  VISUALIZED VESSELS:  Unremarkable for patient's age  ABDOMINOPELVIC WALL/INGUINAL REGIONS:  Unremarkable  OSSEOUS STRUCTURES:  Nondisplaced fractures of the right superior and inferior pubic rami are identified  Impression: Nondisplaced right superior and inferior pubic rami fractures   Workstation performed: [id number]        Recent Cultures (last 7 days):           Last 24 Hours Medication List:     Current Facility-Administered Medications:  acetaminophen 650 mg Oral Q6H PRN Davion Richardson PA-C    buPROPion 100 mg Oral Daily Davion Richardson PA-C    diazepam 5 mg Oral Q12H PRN Davion Richardson PA-C    enoxaparin 40 mg Subcutaneous Daily Davion Richardson PA-C    estradiol 1 patch Transdermal Weekly Davion Richardson PA-C    HYDROmorphone 1 mg Intravenous Q3H PRN Davion Richardson PA-C    levothyroxine 50 mcg Oral Early Morning Dinesh Wells PA-C    liothyronine 5 mcg Oral Early Morning Dinesh Wells PA-C    oxyCODONE 5 mg Oral Q4H PRN Dinesh Wells PA-C    pneumococcal 13-valent conjugate vaccine 0 5 mL Intramuscular Prior to discharge Mario Spotted, DO    progesterone 100 mg Oral BID Dinesh Wells PA-C    promethazine 12 5 mg Intravenous Q4H PRN Claudene Bradford, MD    sodium chloride 0 9 % with KCl 20 mEq/L 100 mL/hr Intravenous Continuous Claudene Bradford, MD Last Rate: 100 mL/hr (02/17/18 0038)   temazepam 15 mg Oral HS Dinesh Wells PA-C         Today, Patient Was Seen By: Claudene Bradford, MD    ** Please Note: Dragon 360 Dictation voice to text software may have been used in the creation of this document   **

## 2018-02-18 LAB
ANION GAP SERPL CALCULATED.3IONS-SCNC: 8 MMOL/L (ref 4–13)
BACTERIA UR QL AUTO: ABNORMAL /HPF
BASOPHILS # BLD AUTO: 0.03 THOUSANDS/ΜL (ref 0–0.1)
BASOPHILS NFR BLD AUTO: 0 % (ref 0–1)
BILIRUB UR QL STRIP: NEGATIVE
BUN SERPL-MCNC: 5 MG/DL (ref 5–25)
CALCIUM SERPL-MCNC: 8.5 MG/DL (ref 8.3–10.1)
CHLORIDE SERPL-SCNC: 102 MMOL/L (ref 100–108)
CLARITY UR: CLEAR
CO2 SERPL-SCNC: 29 MMOL/L (ref 21–32)
COLOR UR: YELLOW
CREAT SERPL-MCNC: 0.69 MG/DL (ref 0.6–1.3)
EOSINOPHIL # BLD AUTO: 0.15 THOUSAND/ΜL (ref 0–0.61)
EOSINOPHIL NFR BLD AUTO: 2 % (ref 0–6)
ERYTHROCYTE [DISTWIDTH] IN BLOOD BY AUTOMATED COUNT: 13 % (ref 11.6–15.1)
GFR SERPL CREATININE-BSD FRML MDRD: 95 ML/MIN/1.73SQ M
GLUCOSE SERPL-MCNC: 88 MG/DL (ref 65–140)
GLUCOSE UR STRIP-MCNC: NEGATIVE MG/DL
HCT VFR BLD AUTO: 35.4 % (ref 34.8–46.1)
HGB BLD-MCNC: 12 G/DL (ref 11.5–15.4)
HGB UR QL STRIP.AUTO: NEGATIVE
KETONES UR STRIP-MCNC: NEGATIVE MG/DL
LEUKOCYTE ESTERASE UR QL STRIP: ABNORMAL
LYMPHOCYTES # BLD AUTO: 1.93 THOUSANDS/ΜL (ref 0.6–4.47)
LYMPHOCYTES NFR BLD AUTO: 19 % (ref 14–44)
MCH RBC QN AUTO: 33.5 PG (ref 26.8–34.3)
MCHC RBC AUTO-ENTMCNC: 33.9 G/DL (ref 31.4–37.4)
MCV RBC AUTO: 99 FL (ref 82–98)
MONOCYTES # BLD AUTO: 1.01 THOUSAND/ΜL (ref 0.17–1.22)
MONOCYTES NFR BLD AUTO: 10 % (ref 4–12)
NEUTROPHILS # BLD AUTO: 7.09 THOUSANDS/ΜL (ref 1.85–7.62)
NEUTS SEG NFR BLD AUTO: 69 % (ref 43–75)
NITRITE UR QL STRIP: POSITIVE
NON-SQ EPI CELLS URNS QL MICRO: ABNORMAL /HPF
NRBC BLD AUTO-RTO: 0 /100 WBCS
PH UR STRIP.AUTO: 5.5 [PH] (ref 4.5–8)
PLATELET # BLD AUTO: 184 THOUSANDS/UL (ref 149–390)
PMV BLD AUTO: 10.7 FL (ref 8.9–12.7)
POTASSIUM SERPL-SCNC: 3.9 MMOL/L (ref 3.5–5.3)
PROT UR STRIP-MCNC: NEGATIVE MG/DL
RBC # BLD AUTO: 3.58 MILLION/UL (ref 3.81–5.12)
RBC #/AREA URNS AUTO: ABNORMAL /HPF
SODIUM SERPL-SCNC: 139 MMOL/L (ref 136–145)
SP GR UR STRIP.AUTO: <=1.005 (ref 1–1.03)
UROBILINOGEN UR QL STRIP.AUTO: 0.2 E.U./DL
WBC # BLD AUTO: 10.21 THOUSAND/UL (ref 4.31–10.16)
WBC #/AREA URNS AUTO: ABNORMAL /HPF

## 2018-02-18 PROCEDURE — 97110 THERAPEUTIC EXERCISES: CPT

## 2018-02-18 PROCEDURE — 99232 SBSQ HOSP IP/OBS MODERATE 35: CPT | Performed by: INTERNAL MEDICINE

## 2018-02-18 PROCEDURE — 85025 COMPLETE CBC W/AUTO DIFF WBC: CPT | Performed by: INTERNAL MEDICINE

## 2018-02-18 PROCEDURE — 80048 BASIC METABOLIC PNL TOTAL CA: CPT | Performed by: INTERNAL MEDICINE

## 2018-02-18 PROCEDURE — 84443 ASSAY THYROID STIM HORMONE: CPT | Performed by: FAMILY MEDICINE

## 2018-02-18 PROCEDURE — 81001 URINALYSIS AUTO W/SCOPE: CPT | Performed by: INTERNAL MEDICINE

## 2018-02-18 PROCEDURE — 97116 GAIT TRAINING THERAPY: CPT

## 2018-02-18 PROCEDURE — 97530 THERAPEUTIC ACTIVITIES: CPT

## 2018-02-18 RX ADMIN — PROMETHAZINE HYDROCHLORIDE 12.5 MG: 25 INJECTION INTRAMUSCULAR; INTRAVENOUS at 03:35

## 2018-02-18 RX ADMIN — OXYCODONE HYDROCHLORIDE 5 MG: 5 TABLET ORAL at 22:09

## 2018-02-18 RX ADMIN — HYDROMORPHONE HYDROCHLORIDE 1 MG: 1 INJECTION, SOLUTION INTRAMUSCULAR; INTRAVENOUS; SUBCUTANEOUS at 23:00

## 2018-02-18 RX ADMIN — OXYCODONE HYDROCHLORIDE 5 MG: 5 TABLET ORAL at 13:41

## 2018-02-18 RX ADMIN — HYDROMORPHONE HYDROCHLORIDE 1 MG: 1 INJECTION, SOLUTION INTRAMUSCULAR; INTRAVENOUS; SUBCUTANEOUS at 03:35

## 2018-02-18 RX ADMIN — LIOTHYRONINE SODIUM 5 MCG: 5 TABLET ORAL at 05:56

## 2018-02-18 RX ADMIN — PROGESTERONE 100 MG: 100 CAPSULE, LIQUID FILLED ORAL at 17:13

## 2018-02-18 RX ADMIN — PROGESTERONE 100 MG: 100 CAPSULE, LIQUID FILLED ORAL at 08:03

## 2018-02-18 RX ADMIN — LEVOTHYROXINE SODIUM 50 MCG: 50 TABLET ORAL at 05:56

## 2018-02-18 RX ADMIN — ENOXAPARIN SODIUM 40 MG: 40 INJECTION SUBCUTANEOUS at 08:03

## 2018-02-18 RX ADMIN — ACETAMINOPHEN 650 MG: 325 TABLET, FILM COATED ORAL at 22:08

## 2018-02-18 RX ADMIN — OXYCODONE HYDROCHLORIDE 5 MG: 5 TABLET ORAL at 05:56

## 2018-02-18 RX ADMIN — ACETAMINOPHEN 650 MG: 325 TABLET, FILM COATED ORAL at 07:34

## 2018-02-18 RX ADMIN — OXYCODONE HYDROCHLORIDE 5 MG: 5 TABLET ORAL at 18:04

## 2018-02-18 RX ADMIN — OXYCODONE HYDROCHLORIDE 5 MG: 5 TABLET ORAL at 01:49

## 2018-02-18 RX ADMIN — BUPROPION HYDROCHLORIDE 100 MG: 100 TABLET, FILM COATED ORAL at 08:02

## 2018-02-18 NOTE — PROGRESS NOTES
[facility] Progress Note  Patient: Johanna To 63 y o  female   MRN: [id number]  PCP: Alejandra Koch DO  Unit/Bed#: E2 -01 Encounter: [id number]  Date Of Visit: 18    Assessment:    Principal Problem:    Fracture of multiple pubic rami ([address] )  Active Problems:    Hx of breast cancer    Depression    Hypothyroid      Plan:    · Fracture of multiple pubic rami with CT showing right superior and inferior pubic rami fractures after mechanical fall continued analgesia with oxycodone and Dilaudid for pain after PT/OT recommendation for  short-term rehab await disposition to [facility] on Monday the   appreciate orthopedic input recommending weight-bearing as tolerated with walker  · Low-grade temperature elevation will check urinalysis  · History of breast cancer on estradiol and progesterone? · Hypothyroidism continue levothyroxine and liothyronine  · Depression continue Wellbutrin  · Nausea and emesis with usage of narcotic analgesia oxycodone relieved with Phenergan Zofran was less effective add bowel regimen      VTE Pharmacologic Prophylaxis:   Pharmacologic: Enoxaparin (Lovenox)  Mechanical VTE Prophylaxis in Place: Yes    Discussions with Specialists or Other Care Team Provider: no    Time Spent for Care: 30 minutes  More than 50% of total time spent on counseling and coordination of care as described above  Subjective:   Pain mostly relieved with either Dilaudid or oxycodone but it has some nausea and poor appetite  Phenergan was much more effective than Zofran for this and is now eating  Denies any shortness of breath chest pain  PT/OT recommendations for rehab she is agreeable and awaiting disposition    Objective:     Vitals:   Temp (24hrs), Av 9 °F (37 2 °C), Min:98 6 °F (37 °C), Max:99 1 °F (37 3 °C)    HR:  [89-95] 95  Resp:  [16-20] 18  BP: (126-144)/(67-76) 126/76  SpO2:  [94 %-95 %] 94 %  Body mass index is 18 4 kg/m²       Input and Output Summary (last 24 hours): Intake/Output Summary (Last 24 hours) at 02/18/18 0949  Last data filed at 02/18/18 0540   Gross per 24 hour   Intake                0 ml   Output              725 ml   Net             -725 ml       Physical Exam:     Physical Exam:   General appearance: alert, appears stated age and cooperative  Head: Normocephalic, without obvious abnormality, atraumatic  Lungs: clear to auscultation bilaterally  Heart: regular rate and rhythm  Abdomen: soft, non-tender; bowel sounds normal; no masses,  no organomegaly  Back: negative  Extremities: extremities normal, atraumatic, no cyanosis or edema  Neurologic: Grossly normal      Additional Data:     Labs:      Results from last 7 days  Lab Units 02/18/18  0425   WBC Thousand/uL 10 21*   HEMOGLOBIN g/dL 12 0   HEMATOCRIT % 35 4   PLATELETS Thousands/uL 184   NEUTROS PCT % 69   LYMPHS PCT % 19   MONOS PCT % 10   EOS PCT % 2       Results from last 7 days  Lab Units 02/18/18  0425   SODIUM mmol/L 139   POTASSIUM mmol/L 3 9   CHLORIDE mmol/L 102   CO2 mmol/L 29   BUN mg/dL 5   CREATININE mg/dL 0 69   CALCIUM mg/dL 8 5   GLUCOSE RANDOM mg/dL 88       Results from last 7 days  Lab Units 02/15/18  1720   INR  1 00       * I Have Reviewed All Lab Data Listed Above  * Additional Pertinent Lab Tests Reviewed: [facility]    Imaging:  Xr Hip/pelv 2-3 Vws Right    Addendum Date: 2/15/2018 Addendum:   Addendum: Nondisplaced right superior and inferior pubic ramus fractures are noted  Result Date: 2/15/2018  Narrative: RIGHT HIP INDICATION:  Right hip pain  COMPARISON: None VIEWS: AP pelvis and 2 coned down views of the hip IMAGES:  4 FINDINGS: There is no acute fracture or dislocation  No degenerative changes  No lytic or blastic lesions are seen  Soft tissues are unremarkable  Impression: No acute osseous abnormality   Workstation performed: [id number]     Ct Pelvis Wo Contrast    Result Date: 2/15/2018  Narrative: CT PELVIS WITHOUT IV CONTRAST INDICATION:  evaluate fracture  History taken directly from the electronic ordering system  COMPARISON:  X-ray same day  TECHNIQUE: CT examination of the pelvis was performed without intravenous contrastAxial, sagittal, and coronal 2D reformatted images were created from the source data and submitted for interpretation  Radiation dose length product (DLP) for this visit:   This examination, like all CT scans performed in the [facility], was performed utilizing techniques to minimize radiation dose exposure, including the use of iterative reconstruction  and automated exposure control  Enteric contrast was administered  FINDINGS: VISUALIZED KIDNEYS/URETERS:  No significant abnormality identified in the partially imaged kidneys and ureters  REPRODUCTIVE ORGANS:  Unremarkable for patient's age  URINARY BLADDER:  Unremarkable  APPENDIX:  No findings to suggest appendicitis  VISUALIZED BOWEL:  Unremarkable  ABDOMINOPELVIC CAVITY:  No ascites or free intraperitoneal air  No lymphadenopathy  VISUALIZED VESSELS:  Unremarkable for patient's age  ABDOMINOPELVIC WALL/INGUINAL REGIONS:  Unremarkable  OSSEOUS STRUCTURES:  Nondisplaced fractures of the right superior and inferior pubic rami are identified  Impression: Nondisplaced right superior and inferior pubic rami fractures  Workstation performed: [id number]     Imaging Reports Reviewed Today Include:  CT abdomen and pelvis  Imaging Personally Reviewed by Myself Includes:    Procedure: Xr Hip/pelv 2-3 Vws Right    Addendum Date: 2/15/2018 Addendum:   Addendum: Nondisplaced right superior and inferior pubic ramus fractures are noted  Result Date: 2/15/2018  Narrative: RIGHT HIP INDICATION:  Right hip pain  COMPARISON: None VIEWS: AP pelvis and 2 coned down views of the hip IMAGES:  4 FINDINGS: There is no acute fracture or dislocation  No degenerative changes  No lytic or blastic lesions are seen  Soft tissues are unremarkable  Impression: No acute osseous abnormality  Workstation performed: [id number]     Procedure: Ct Pelvis Wo Contrast    Result Date: 2/15/2018  Narrative: CT PELVIS WITHOUT IV CONTRAST INDICATION:  evaluate fracture  History taken directly from the electronic ordering system  COMPARISON:  X-ray same day  TECHNIQUE: CT examination of the pelvis was performed without intravenous contrastAxial, sagittal, and coronal 2D reformatted images were created from the source data and submitted for interpretation  Radiation dose length product (DLP) for this visit:   This examination, like all CT scans performed in the [facility], was performed utilizing techniques to minimize radiation dose exposure, including the use of iterative reconstruction  and automated exposure control  Enteric contrast was administered  FINDINGS: VISUALIZED KIDNEYS/URETERS:  No significant abnormality identified in the partially imaged kidneys and ureters  REPRODUCTIVE ORGANS:  Unremarkable for patient's age  URINARY BLADDER:  Unremarkable  APPENDIX:  No findings to suggest appendicitis  VISUALIZED BOWEL:  Unremarkable  ABDOMINOPELVIC CAVITY:  No ascites or free intraperitoneal air  No lymphadenopathy  VISUALIZED VESSELS:  Unremarkable for patient's age  ABDOMINOPELVIC WALL/INGUINAL REGIONS:  Unremarkable  OSSEOUS STRUCTURES:  Nondisplaced fractures of the right superior and inferior pubic rami are identified  Impression: Nondisplaced right superior and inferior pubic rami fractures   Workstation performed: [id number]        Recent Cultures (last 7 days):           Last 24 Hours Medication List:     Current Facility-Administered Medications:  acetaminophen 650 mg Oral Q6H PRN Ezella Newer, PA-C   buPROPion 100 mg Oral Daily Ezella Newer, PA-C   diazepam 5 mg Oral Q12H PRN Ezella Newer, PA-C   enoxaparin 40 mg Subcutaneous Daily Ezella Newer, PA-C   estradiol 1 patch Transdermal Weekly Ezella Newer, PA-C HYDROmorphone 1 mg Intravenous Q3H PRN Priscilla Stark PA-C   levothyroxine 50 mcg Oral Early Morning [place] Shanks, PA-ZAC   liothyronine 5 mcg Oral Early Morning [place], PA-ZAC   oxyCODONE 5 mg Oral Q4H PRN KARAN Johns-ZAC   pneumococcal 13-valent conjugate vaccine 0 5 mL Intramuscular Prior to discharge Dorsie Quiet, DO   progesterone 100 mg Oral BID Priscilla Stark PA-C   promethazine 12 5 mg Intravenous Q4H PRN Nicolasa Sage MD   temazepam 15 mg Oral HS Priscilla Stark PA-C        Today, Patient Was Seen By: Nicolasa Sage MD    ** Please Note: Dragon 360 Dictation voice to text software may have been used in the creation of this document   **

## 2018-02-18 NOTE — PHYSICAL THERAPY NOTE
Physical Therapy Progress Note     02/18/18 0918   Pain Assessment   Pain Assessment 0-10   Pain Score 6   Pain Type Acute pain   Pain Location Hip   Pain Orientation Right   Hospital Pain Intervention(s) Cold applied; Ambulation/increased activity;Repositioned   Response to Interventions Tolerated  Restrictions/Precautions   Weight Bearing Precautions Per Order Yes   RLE Weight Bearing Per Order WBAT   Other Precautions WBS; Fall Risk;Pain;Cognitive; Chair Alarm   General   Chart Reviewed Yes   Response to Previous Treatment Patient reporting fatigue but able to participate  Family/Caregiver Present No   Subjective   Subjective Willing to participate in therapy this Am  Bed Mobility   Supine to Sit 4  Minimal assistance   Additional items Assist x 1;HOB elevated; Bedrails; Increased time required;Verbal cues;LE management;Leg    Transfers   Sit to Stand 3  Moderate assistance   Additional items Assist x 1;Bedrails; Increased time required;Verbal cues   Stand to Sit 3  Moderate assistance   Additional items Assist x 1; Armrests; Increased time required;Verbal cues   Toilet transfer 3  Moderate assistance   Additional items Assist x 1; Armrests; Increased time required;Verbal cues; Commode   Ambulation/Elevation   Gait pattern Decreased foot clearance; Improper Weight shift;Decreased R stance; Short stride; Excessively slow; Step to; Antalgic; Forward Flexion   Gait Assistance 3  Moderate assist   Additional items Assist x 1;Verbal cues; Tactile cues   Assistive Device Rolling walker   Distance 10' x 1, 20' x 1 with seated toileting break in between   Balance   Static Sitting Fair   Dynamic Sitting Fair   Static Standing Poor +   Dynamic Standing Poor +   Ambulatory Poor +   Endurance Deficit   Endurance Deficit Yes   Endurance Deficit Description fatigue/pain   Activity Tolerance   Activity Tolerance Patient limited by pain; Patient limited by fatigue   Nurse Made Aware Yes   Exercises   THR Sitting;10 reps;AAROM; Bilateral   Assessment   Prognosis Fair   Problem List Decreased strength;Decreased range of motion;Decreased endurance; Impaired balance;Decreased mobility; Decreased cognition;Decreased safety awareness;Orthopedic restrictions;Pain   Assessment Pt  supine in bed upon my arrival  Willing to participate in therapy, however requesting to use br, prior to therapy session  Continues to require A with all transfers with cues provided for proper technique/hand placement  Remains at a decreased safety awareness attempting to sit without commode behind pt  Cueing provided for safety  Able to complete self pericare  Progressed with an increased amb  trial this treatment session, with RW and A of therapist  Gladys Zuniga provided for attempts of WBing through RLE during gait phase  Pt  remains reluctant, due to increased pain however noted several attempts/improvement  Positioned seated in bedside chair, where HEP was performed  Remained seated in bedside chair with alarm active at end of treatment session and ice applied  PT will continue to recommend rehab upon d/c for continued improvement of noted impairments above  Barriers to Discharge Inaccessible home environment;Decreased caregiver support   Barriers to Discharge Comments [place]   Goals   Patient Goals To go to rehab  STG Expiration Date 02/26/18   Treatment Day 2   Plan   Treatment/Interventions Functional transfer training;LE strengthening/ROM; Therapeutic exercise; Endurance training;Bed mobility;Gait training;Spoke to nursing;Spoke to case management   Progress Slow progress, decreased activity tolerance   PT Frequency 5x/wk; Weekend  ((1-2/wknd))   Recommendation   Recommendation Short-term skilled PT   Equipment Recommended Walker  (RW)   PT - OK to Discharge Yes  (if d/c to rehab when medically stable )     Faustino Kruse, ADRIANE

## 2018-02-18 NOTE — PLAN OF CARE
Problem: PHYSICAL THERAPY ADULT  Goal: Performs mobility at highest level of function for planned discharge setting  See evaluation for individualized goals  Treatment/Interventions: Functional transfer training, LE strengthening/ROM, Elevations, Therapeutic exercise, Endurance training, Patient/family training, Equipment eval/education, Bed mobility, Gait training, Spoke to nursing, OT  Equipment Recommended: Sigifredo Johnson       See flowsheet documentation for full assessment, interventions and recommendations  Outcome: Progressing  Prognosis: Fair  Problem List: Decreased strength, Decreased range of motion, Decreased endurance, Impaired balance, Decreased mobility, Decreased cognition, Decreased safety awareness, Orthopedic restrictions, Pain  Assessment: Pt  supine in bed upon my arrival  Willing to participate in therapy, however requesting to use br, prior to therapy session  Continues to require A with all transfers with cues provided for proper technique/hand placement  Remains at a decreased safety awareness attempting to sit without commode behind pt  Cueing provided for safety  Able to complete self pericare  Progressed with an increased amb  trial this treatment session, with RW and A of therapist  Toni Nunez provided for attempts of WBing through RLE during gait phase  Pt  remains reluctant, due to increased pain however noted several attempts/improvement  Positioned seated in bedside chair, where HEP was performed  Remained seated in bedside chair with alarm active at end of treatment session and ice applied  PT will continue to recommend rehab upon d/c for continued improvement of noted impairments above  Barriers to Discharge: Inaccessible home environment, Decreased caregiver support  Barriers to Discharge Comments: [place]  Recommendation: Short-term skilled PT     PT - OK to Discharge: Yes (if d/c to rehab when medically stable )    See flowsheet documentation for full assessment

## 2018-02-19 PROBLEM — R35.0 URINARY FREQUENCY: Status: ACTIVE | Noted: 2018-02-19

## 2018-02-19 LAB — TSH SERPL DL<=0.05 MIU/L-ACNC: 2.15 UIU/ML (ref 0.36–3.74)

## 2018-02-19 PROCEDURE — 99232 SBSQ HOSP IP/OBS MODERATE 35: CPT | Performed by: FAMILY MEDICINE

## 2018-02-19 PROCEDURE — 87077 CULTURE AEROBIC IDENTIFY: CPT | Performed by: FAMILY MEDICINE

## 2018-02-19 PROCEDURE — 87186 SC STD MICRODIL/AGAR DIL: CPT | Performed by: FAMILY MEDICINE

## 2018-02-19 PROCEDURE — 97116 GAIT TRAINING THERAPY: CPT

## 2018-02-19 PROCEDURE — 97110 THERAPEUTIC EXERCISES: CPT

## 2018-02-19 PROCEDURE — 97535 SELF CARE MNGMENT TRAINING: CPT

## 2018-02-19 PROCEDURE — 97530 THERAPEUTIC ACTIVITIES: CPT

## 2018-02-19 PROCEDURE — 87086 URINE CULTURE/COLONY COUNT: CPT | Performed by: FAMILY MEDICINE

## 2018-02-19 RX ORDER — AMOXICILLIN 250 MG
1 CAPSULE ORAL
Status: DISCONTINUED | OUTPATIENT
Start: 2018-02-19 | End: 2018-02-20 | Stop reason: HOSPADM

## 2018-02-19 RX ADMIN — TEMAZEPAM 15 MG: 15 CAPSULE ORAL at 00:03

## 2018-02-19 RX ADMIN — OXYCODONE HYDROCHLORIDE 5 MG: 5 TABLET ORAL at 08:20

## 2018-02-19 RX ADMIN — DIAZEPAM 5 MG: 5 TABLET ORAL at 23:57

## 2018-02-19 RX ADMIN — PROGESTERONE 100 MG: 100 CAPSULE, LIQUID FILLED ORAL at 08:20

## 2018-02-19 RX ADMIN — LEVOTHYROXINE SODIUM 50 MCG: 50 TABLET ORAL at 05:39

## 2018-02-19 RX ADMIN — OXYCODONE HYDROCHLORIDE 5 MG: 5 TABLET ORAL at 12:27

## 2018-02-19 RX ADMIN — OXYCODONE HYDROCHLORIDE 5 MG: 5 TABLET ORAL at 16:30

## 2018-02-19 RX ADMIN — OXYCODONE HYDROCHLORIDE 5 MG: 5 TABLET ORAL at 20:32

## 2018-02-19 RX ADMIN — OXYCODONE HYDROCHLORIDE 5 MG: 5 TABLET ORAL at 03:41

## 2018-02-19 RX ADMIN — LIOTHYRONINE SODIUM 5 MCG: 5 TABLET ORAL at 05:39

## 2018-02-19 RX ADMIN — BUPROPION HYDROCHLORIDE 100 MG: 100 TABLET, FILM COATED ORAL at 08:20

## 2018-02-19 RX ADMIN — ENOXAPARIN SODIUM 40 MG: 40 INJECTION SUBCUTANEOUS at 08:20

## 2018-02-19 RX ADMIN — Medication 1 TABLET: at 21:56

## 2018-02-19 RX ADMIN — Medication 1 PATCH: at 10:44

## 2018-02-19 RX ADMIN — TEMAZEPAM 15 MG: 15 CAPSULE ORAL at 21:56

## 2018-02-19 RX ADMIN — PROGESTERONE 100 MG: 100 CAPSULE, LIQUID FILLED ORAL at 17:46

## 2018-02-19 NOTE — PROGRESS NOTES
Pt ambulating to the bathroom with minimal pain  Pt able to manage pain on PO meds  Pt resting comfortably with no real complaints

## 2018-02-19 NOTE — PLAN OF CARE
Problem: PHYSICAL THERAPY ADULT  Goal: Performs mobility at highest level of function for planned discharge setting  See evaluation for individualized goals  Treatment/Interventions: Functional transfer training, LE strengthening/ROM, Elevations, Therapeutic exercise, Endurance training, Patient/family training, Equipment eval/education, Bed mobility, Gait training, Spoke to nursing, OT  Equipment Recommended: Bob Kurtz       See flowsheet documentation for full assessment, interventions and recommendations  Outcome: Progressing  Prognosis: Good  Problem List: Decreased strength, Decreased range of motion, Decreased endurance, Impaired balance, Decreased mobility, Pain  Assessment: PT showing good progress toward goals  PT continues to require min assist for transfers and ambulation due to increased right hip and pelvis pain, decreased strength, gait deviations and decreased activity tolerance and mobility  PT  Requires increased time to perform all aspects of mobility  Pt   Requires verbal cues for safe and proper mobility techniques  All mobility is slow and requires increased time to perform  Pt   Requires assistance to lift R le into bed and assistance for arom  PT will continue to benefit from inpt rehab inorder to progress mobility and maximize function and independence  Barriers to Discharge: Inaccessible home environment, Decreased caregiver support  Barriers to Discharge Comments: [place]  Recommendation: Short-term skilled PT     PT - OK to Discharge: Yes (rehab)    See flowsheet documentation for full assessment

## 2018-02-19 NOTE — OCCUPATIONAL THERAPY NOTE
Occupational Therapy Treatment Note:       02/19/18 5830   Restrictions/Precautions   Weight Bearing Precautions Per Order Yes   RLE Weight Bearing Per Order WBAT   LLE Weight Bearing Per Order WBAT   Other Precautions Fall Risk;Pain;Cognitive; Chair Alarm; Bed Alarm   Pain Assessment   Pain Assessment 0-10   Pain Score 7   Pain Type Acute pain   Pain Location Hip   Pain Orientation Right   ADL   Where Assessed Supine, bed   Equipment Provided (long sitting )   Grooming Assistance 4  Minimal Assistance   Grooming Deficit Setup;Steadying;Verbal cueing;Supervision/safety; Increased time to complete;Wash/dry hands; Wash/dry face;Brushing hair;Standing with assistive device   Grooming Comments cues for appropriate use of RW provided  UB Bathing Assistance 4  Minimal Assistance   UB Bathing Deficit Setup   LB Bathing Assistance 3  Moderate Assistance   LB Bathing Deficit Setup;Steadying;Verbal cueing;Supervision/safety; Increased time to complete   LB Bathing Comments Pt will benefit from further review  UB Dressing Assistance 5  Supervision/Setup   UB Dressing Deficit Setup;Verbal cueing;Supervision/safety; Increased time to complete; Thread RUE; Thread LUE   LB Dressing Assistance 3  Moderate Assistance   LB Dressing Deficit Setup;Steadying; Requires assistive device for steadying;Supervision/safety;Verbal cueing; Increased time to complete; Don/doff L sock; Don/doff R sock; Thread RLE into underwear; Thread LLE into underwear;Pull up over hips   LB Dressing Comments Pt used bridging techs with F qualities noted  Toileting Assistance  4  Minimal Assistance   Toileting Deficit Setup;Steadying;Verbal cueing;Supervison/safety; Increased time to complete; Bedside commode;Clothing management up;Clothing management down;Perineal hygiene   Toileting Comments cues for safe techs required  Functional Standing Tolerance   Time 5 mins      Activity self care routine,    Comments Pt with noted fatigue and pain with activities instance  Bed Mobility   Supine to Sit 3  Moderate assistance   Additional items Assist x 1   Sit to Supine 3  Moderate assistance   Additional items Assist x 1;Bedrails; Increased time required;Verbal cues;LE management   Additional Comments Increased pain noted with initial upright positioning  Transfers   Sit to Stand 4  Minimal assistance   Additional items Assist x 1; Armrests; Bedrails; Increased time required;Verbal cues   Stand to Sit 4  Minimal assistance   Additional items Assist x 1; Armrests; Bedrails; Increased time required;Verbal cues   Stand pivot 4  Minimal assistance   Additional items Assist x 1; Increased time required;Verbal cues; Bedrails;Armrests   Toilet transfer 4  Minimal assistance   Additional items Assist x 1; Increased time required;Verbal cues; Commode   Additional Comments cues required to push from base of support  Functional Mobility   Functional Mobility 3  Moderate assistance   Additional Comments x1   Additional items Rolling walker   Cognition   Overall Cognitive Status WFL   Arousal/Participation Alert; Cooperative   Attention Within functional limits   Orientation Level Oriented X4   Memory Within functional limits   Following Commands Follows multistep commands with increased time or repetition   Comments Pt able to follow commands with good carry over  Additional Activities   Additional Activities Other (Comment)  (reviewed LE dressing techs/bridging  )   Additional Activities Comments Pt reports having less pain with LE dressing while long sitting in bed  Encouraged application of affected LE into pant leg then LLE  Activity Tolerance   Activity Tolerance Patient limited by pain; Patient limited by fatigue   Medical Staff Made Aware Reported all findings to nursing staff  Assessment   Assessment Pt was seen for skilled OT with focus on completion of self care tasks, functional transfers and review of RW safety and LE dressing techs  Pt with 7/10 pain this tx session  Extended time provided for bed mobility with encouragement to move body as one unit to inhibit noted pain levels  See above levels of A required for all functional tasks  Pt requested use of bridging techs stating, "I have less pain that way"  Pt may benefit from further rehab with focus on achieving optimal performance levels with all functional tasks  Plan   Treatment Interventions ADL retraining;Functional transfer training;Cognitive reorientation; Endurance training   Goal Expiration Date 02/26/18   Treatment Day 1   OT Frequency 3-5x/wk   Recommendation   OT Discharge Recommendation Short Term Rehab   Barthel Index   Feeding 10   Bathing 0   Grooming Score 0   Dressing Score 5   Bladder Score 10   Bowels Score 10   Toilet Use Score 5   Transfers (Bed/Chair) Score 5   Mobility (Level Surface) Score 0   Stairs Score 0   [place] Index Score 45   Modified [place] Scale   Modified [place] Scale 4   Debra Hoff, [address]

## 2018-02-19 NOTE — PHYSICAL THERAPY NOTE
Physical Therapy Treatment Note     02/19/18 1051   Pain Assessment   Pain Assessment 0-10   Pain Score 9   Pain Type Acute pain   Pain Location Hip   Pain Orientation Right   Hospital Pain Intervention(s) Cold applied;Repositioned; Ambulation/increased activity   Response to Interventions tolerated   Restrictions/Precautions   RLE Weight Bearing Per Order WBAT   LLE Weight Bearing Per Order WBAT   Other Precautions Fall Risk;Pain   General   Chart Reviewed Yes   Response to Previous Treatment Patient with no complaints from previous session  Family/Caregiver Present No   Cognition   Overall Cognitive Status WFL   Subjective   Subjective PT out of bed in chair upon arrival  Pt reports pain anywhere from 2/10 tp 9/10  Pt agreeable to PT  Bed Mobility   Sit to Supine 4  Minimal assistance   Additional items Assist x 1; Increased time required;Verbal cues;LE management   Transfers   Sit to Stand 4  Minimal assistance   Additional items Assist x 1; Armrests; Increased time required;Verbal cues   Stand to Sit 5  Supervision   Additional items Verbal cues; Increased time required   Ambulation/Elevation   Gait pattern Decreased R stance; Excessively slow; Step to;Short stride   Gait Assistance 4  Minimal assist   Additional items Assist x 1;Verbal cues   Assistive Device Rolling walker   Distance 20'x2, 2 standing rest breaks   Balance   Static Sitting Fair +   Static Standing Fair   Dynamic Standing Poor +   Ambulatory Poor +   Endurance Deficit   Endurance Deficit Yes   Endurance Deficit Description fatigue, pain  Activity Tolerance   Activity Tolerance Patient limited by pain; Patient limited by fatigue   Nurse Made Aware RN [facility] FOR PSYCHIATRY   Exercises   Quad Sets Supine;10 reps;AROM; Bilateral   Heelslides Supine;10 reps;AAROM; Right  (arom l le)   Glute Sets Supine;10 reps;AROM; Bilateral   Knee AROM Short Arc Quad Supine;10 reps;AROM; Bilateral   Ankle Pumps Supine;10 reps;AROM; Bilateral   Assessment   Prognosis Good Problem List Decreased strength;Decreased range of motion;Decreased endurance; Impaired balance;Decreased mobility;Pain   Assessment PT showing good progress toward goals  PT continues to require min assist for transfers and ambulation due to increased right hip and pelvis pain, decreased strength, gait deviations and decreased activity tolerance and mobility  PT  Requires increased time to perform all aspects of mobility  Pt   Requires verbal cues for safe and proper mobility techniques  All mobility is slow and requires increased time to perform  Pt   Requires assistance to lift R le into bed and assistance for arom  PT will continue to benefit from inpt rehab inorder to progress mobility and maximize function and independence  Goals   Patient Goals to go to rehab   STG Expiration Date 02/26/18   Treatment Day 3   Plan   Treatment/Interventions Functional transfer training;LE strengthening/ROM; Elevations; Therapeutic exercise; Endurance training;Patient/family training;Equipment eval/education; Bed mobility;Gait training;Spoke to nursing   Progress Progressing toward goals   PT Frequency 5x/wk; Weekend  (1-2x weekend)   Recommendation   Recommendation Short-term skilled PT   PT - OK to Discharge Yes  (rehab)         Radha Moctezuma PTA

## 2018-02-19 NOTE — ASSESSMENT & PLAN NOTE
· S/p mechanical fall  · Arrangement for inpatient acute rehab vs STR based on insurance approval  · Continue PT/OT, supportive treatment for pain/bowel regimen

## 2018-02-19 NOTE — CASE MANAGEMENT
Continued Stay Review    Date:    2/19/2018    Vital Signs: /83 (BP Location: Left arm)   Pulse 84   Temp 97 5 °F (36 4 °C) (Tympanic)   Resp 16   Wt 51 7 kg (114 lb)   SpO2 97%   BMI 18 40 kg/m²     Medications:   Scheduled Meds:   Current Facility-Administered Medications:  acetaminophen 650 mg Oral Q6H PRN Memory Grew, PA-C   buPROPion 100 mg Oral Daily Memory Grew, PA-C   diazepam 5 mg Oral Q12H PRN Memory Grew, PA-C   enoxaparin 40 mg Subcutaneous Daily Memory Grew, PA-C   estradiol 1 patch Transdermal Once per day on Mon Butch Hu MD   HYDROmorphone 1 mg Intravenous Q3H PRN Memory Grew, PA-C   levothyroxine 50 mcg Oral Early Morning Memory Grew, PA-C   liothyronine 5 mcg Oral Early Morning Memory Grew, PA-C   oxyCODONE 5 mg Oral Q4H PRN Memory Grew, PA-C   pneumococcal 13-valent conjugate vaccine 0 5 mL Intramuscular Prior to discharge Chepe Klein DO   progesterone 100 mg Oral BID Memory Grew, PA-C   promethazine 12 5 mg Intravenous Q4H PRN Radha Amin MD   temazepam 15 mg Oral HS Memory Grew, PA-C     Continuous Infusions:    PRN Meds:   acetaminophen    diazepam    HYDROmorphone    oxyCODONE    pneumococcal 13-valent conjugate vaccine    promethazine    Abnormal Labs/Diagnostic Results:    WBC    10 21  (  2/18)  U/A     lg  Leukocytes     + nitrite     (  2/18)    Age/Sex: 63 y o  female     · Assessment/Plan:    Fracture of multiple pubic rami with CT showing right superior and inferior pubic rami fractures after mechanical fall continued analgesia with oxycodone and Dilaudid for pain after PT/OT recommendation for  short-term rehab await disposition to [facility] on Monday the 19th  appreciate orthopedic input recommending weight-bearing as tolerated with walker  · Low-grade temperature elevation will check urinalysis  · History of breast cancer on estradiol and progesterone?   · Hypothyroidism continue levothyroxine and liothyronine  · Depression continue Wellbutrin  Nausea and emesis with usage of narcotic analgesia oxycodone relieved with Phenergan Zofran was less effective add bowel regimen    Discharge Plan:    Acute  Rehab    Thank you,  [address] [facility] in the [facility] by Dominick Albarado for 2017  Network Utilization Review Department  Phone: [phone]; Fax [phone]  ATTENTION: The Network Utilization Review Department is now centralized for our 7 Facilities  Make a note that we have a new phone and fax numbers for our Department  Please call with any questions or concerns to [phone] and carefully follow the prompts so that you are directed to the right person  All voicemails are confidential  Fax any determinations, approvals, denials, and requests for initial or continue stay review clinical to [phone]  Due to HIGH CALL volume, it would be easier if you could please send faxed requests to expedite your requests and in part, help us provide discharge notifications faster

## 2018-02-19 NOTE — PLAN OF CARE
Problem: OCCUPATIONAL THERAPY ADULT  Goal: Performs self-care activities at highest level of function for planned discharge setting  See evaluation for individualized goals  Treatment Interventions: ADL retraining, Functional transfer training, UE strengthening/ROM, Endurance training, Patient/family training, Equipment evaluation/education, Compensatory technique education, Continued evaluation, Energy conservation, Activityengagement          See flowsheet documentation for full assessment, interventions and recommendations  Outcome: Progressing  Limitation: Decreased ADL status, Decreased endurance, Decreased self-care trans, Decreased high-level ADLs  Prognosis: Good  Assessment: Pt was seen for skilled OT with focus on completion of self care tasks, functional transfers and review of RW safety and LE dressing techs  Pt with 7/10 pain this tx session  Extended time provided for bed mobility with encouragement to move body as one unit to inhibit noted pain levels  See above levels of A required for all functional tasks  Pt requested use of bridging techs stating, "I have less pain that way"  Pt may benefit from further rehab with focus on achieving optimal performance levels with all functional tasks       OT Discharge Recommendation: Short Term Rehab  OT - OK to Discharge: Yes (when medically cleared)      Comments: Ernestine Neri, [address]

## 2018-02-19 NOTE — PROGRESS NOTES
Progress Note - Morena Mojica 1955, 63 y o  female MRN: [id number]    Unit/Bed#: E2 -01 Encounter: [id number]    Primary Care Provider: Vincent Duncan DO   Date and time admitted to hospital: 2/15/2018  3:46 PM        * Fracture of multiple pubic rami (HCC)   Assessment & Plan    · Nondisplaced right superior and inferior pubic rami fractures  · S/p mechanical fall  · Arrangement for inpatient acute rehab vs STR based on insurance approval  · Continue PT/OT, supportive treatment for pain/bowel regimen        Hypothyroid   Assessment & Plan    · Continue levothyroxine   · Check TSH        Depression   Assessment & Plan    · Controlled  · Continue home regimen        Hx of breast cancer   Assessment & Plan    · S/p breast surgery  · On estridiol and progesterone therapy          VTE Pharmacologic Prophylaxis:   Pharmacologic: Enoxaparin (Lovenox)  Mechanical VTE Prophylaxis in Place: Yes    Discussions with Specialists or Other Care Team Provider: Case Management    Education and Discussions with Family / Patient: Patient    Time Spent for Care: 30 minutes  More than 50% of total time spent on counseling and coordination of care as described above  Current Length of Stay: 3 day(s)    Current Patient Status: Inpatient   Certification Statement: The patient will continue to require additional inpatient hospital stay due to need for close observation, PT/OT, discharge planning    Discharge Plan: 1-2 days    Code Status: Level 1 - Full Code      Subjective:   Patient seen and examined  She states that her right pelvic pain is controlled for as long as she does not move  She states that she had severe pain last night  She has no other complaints  She states she had a small hard BM yesterday  She denies abdominal pain, nausea, vomiting, diarrhea or constipation  Denies chest pain, SOB or palpitations  Denies dizziness      Objective:     Vitals:   Temp (24hrs), Av 5 °F (36 9 °C), Min:97 5 °F (36 4 °C), Max:99 6 °F (37 6 °C)    HR:  [82-94] 82  Resp:  [16-20] 16  BP: (124-138)/(58-83) 124/58  SpO2:  [93 %-97 %] 95 %  Body mass index is 18 4 kg/m²  Input and Output Summary (last 24 hours): Intake/Output Summary (Last 24 hours) at 02/19/18 1709  Last data filed at 02/19/18 1400   Gross per 24 hour   Intake              120 ml   Output             1525 ml   Net            -1405 ml       Physical Exam:     Physical Exam   Constitutional: She is oriented to person, place, and time  No distress  HENT:   Head: Normocephalic and atraumatic  Eyes: Conjunctivae are normal    Neck: No JVD present  Cardiovascular: Normal rate and regular rhythm  Pulmonary/Chest: Effort normal and breath sounds normal  No respiratory distress  She has no wheezes  She has no rales  Abdominal: Soft  She exhibits no distension  There is no guarding  Musculoskeletal: She exhibits no edema  Neurological: She is alert and oriented to person, place, and time  Skin:   Superficial abrasion right side of torso   Psychiatric: She has a normal mood and affect  Additional Data:     Labs:      Results from last 7 days  Lab Units 02/18/18  0425   WBC Thousand/uL 10 21*   HEMOGLOBIN g/dL 12 0   HEMATOCRIT % 35 4   PLATELETS Thousands/uL 184   NEUTROS PCT % 69   LYMPHS PCT % 19   MONOS PCT % 10   EOS PCT % 2       Results from last 7 days  Lab Units 02/18/18  0425   SODIUM mmol/L 139   POTASSIUM mmol/L 3 9   CHLORIDE mmol/L 102   CO2 mmol/L 29   BUN mg/dL 5   CREATININE mg/dL 0 69   CALCIUM mg/dL 8 5   GLUCOSE RANDOM mg/dL 88       Results from last 7 days  Lab Units 02/15/18  1720   INR  1 00       * I Have Reviewed All Lab Data Listed Above  * Additional Pertinent Lab Tests Reviewed:  [facility] Admission Reviewed    Imaging:    Imaging Reports Reviewed Today Include: CT pelvis with contrast    Recent Cultures (last 7 days):           Last 24 Hours Medication List:     Current Facility-Administered Medications:  acetaminophen 650 mg Oral Q6H PRN Fidelina Smith PA-C   buPROPion 100 mg Oral Daily Fidelina Smith PA-C   diazepam 5 mg Oral Q12H PRN Fidelina Smith PA-C   enoxaparin 40 mg Subcutaneous Daily Fidelina Smith PA-C   estradiol 1 patch Transdermal Once per day on Mon Demetrice Escalante MD   HYDROmorphone 1 mg Intravenous Q3H PRN Fidelina Smith PA-C   levothyroxine 50 mcg Oral Early Morning Fidelina Smith PA-C   liothyronine 5 mcg Oral Early Morning Fidelina Smith PA-C   magnesium hydroxide 30 mL Oral Daily PRN Demetrice Escalante MD   oxyCODONE 5 mg Oral Q4H PRN Fidelina Smith PA-C   pneumococcal 13-valent conjugate vaccine 0 5 mL Intramuscular Prior to discharge Brenda Maloney DO   progesterone 100 mg Oral BID Fidelina Smith PA-C   promethazine 12 5 mg Intravenous Q4H PRN Ervin Roca MD   senna-docusate sodium 1 tablet Oral HS Demetrice Escalante MD   temazepam 15 mg Oral HS Fidelina Smith PA-C        Today, Patient Was Seen By: Lexus Farooq MD    ** Please Note: Dictation voice to text software may have been used in the creation of this document   **

## 2018-02-20 VITALS
TEMPERATURE: 97.7 F | SYSTOLIC BLOOD PRESSURE: 114 MMHG | RESPIRATION RATE: 18 BRPM | OXYGEN SATURATION: 97 % | DIASTOLIC BLOOD PRESSURE: 54 MMHG | HEART RATE: 88 BPM | WEIGHT: 114 LBS | BODY MASS INDEX: 18.4 KG/M2

## 2018-02-20 PROBLEM — N30.00 ACUTE CYSTITIS WITHOUT HEMATURIA: Status: ACTIVE | Noted: 2018-02-19

## 2018-02-20 LAB
BASOPHILS # BLD AUTO: 0.04 THOUSANDS/ΜL (ref 0–0.1)
BASOPHILS NFR BLD AUTO: 1 % (ref 0–1)
EOSINOPHIL # BLD AUTO: 0.22 THOUSAND/ΜL (ref 0–0.61)
EOSINOPHIL NFR BLD AUTO: 3 % (ref 0–6)
ERYTHROCYTE [DISTWIDTH] IN BLOOD BY AUTOMATED COUNT: 12.8 % (ref 11.6–15.1)
HCT VFR BLD AUTO: 35.6 % (ref 34.8–46.1)
HGB BLD-MCNC: 12.2 G/DL (ref 11.5–15.4)
LYMPHOCYTES # BLD AUTO: 2.23 THOUSANDS/ΜL (ref 0.6–4.47)
LYMPHOCYTES NFR BLD AUTO: 25 % (ref 14–44)
MCH RBC QN AUTO: 32.8 PG (ref 26.8–34.3)
MCHC RBC AUTO-ENTMCNC: 34.3 G/DL (ref 31.4–37.4)
MCV RBC AUTO: 96 FL (ref 82–98)
MONOCYTES # BLD AUTO: 1.11 THOUSAND/ΜL (ref 0.17–1.22)
MONOCYTES NFR BLD AUTO: 13 % (ref 4–12)
NEUTROPHILS # BLD AUTO: 5.26 THOUSANDS/ΜL (ref 1.85–7.62)
NEUTS SEG NFR BLD AUTO: 58 % (ref 43–75)
NRBC BLD AUTO-RTO: 0 /100 WBCS
PLATELET # BLD AUTO: 217 THOUSANDS/UL (ref 149–390)
PMV BLD AUTO: 10.3 FL (ref 8.9–12.7)
RBC # BLD AUTO: 3.72 MILLION/UL (ref 3.81–5.12)
WBC # BLD AUTO: 8.86 THOUSAND/UL (ref 4.31–10.16)

## 2018-02-20 PROCEDURE — 85025 COMPLETE CBC W/AUTO DIFF WBC: CPT | Performed by: FAMILY MEDICINE

## 2018-02-20 PROCEDURE — 99239 HOSP IP/OBS DSCHRG MGMT >30: CPT | Performed by: FAMILY MEDICINE

## 2018-02-20 RX ORDER — CIPROFLOXACIN 250 MG/1
250 TABLET, FILM COATED ORAL EVERY 12 HOURS SCHEDULED
Qty: 5 TABLET | Refills: 0 | Status: SHIPPED | OUTPATIENT
Start: 2018-02-20 | End: 2018-02-23

## 2018-02-20 RX ORDER — AMOXICILLIN 250 MG
1 CAPSULE ORAL
Qty: 30 TABLET | Refills: 0 | Status: SHIPPED | OUTPATIENT
Start: 2018-02-20 | End: 2018-04-11

## 2018-02-20 RX ORDER — OXYCODONE HYDROCHLORIDE 5 MG/1
5 TABLET ORAL EVERY 4 HOURS PRN
Qty: 30 TABLET | Refills: 0 | Status: SHIPPED | OUTPATIENT
Start: 2018-02-20 | End: 2018-03-02 | Stop reason: SDUPTHER

## 2018-02-20 RX ORDER — ACETAMINOPHEN 325 MG/1
650 TABLET ORAL EVERY 6 HOURS PRN
Qty: 30 TABLET | Refills: 0 | Status: SHIPPED | OUTPATIENT
Start: 2018-02-20 | End: 2018-12-18

## 2018-02-20 RX ORDER — BUPROPION HYDROCHLORIDE 100 MG/1
100 TABLET ORAL DAILY
Qty: 30 TABLET | Refills: 0 | Status: SHIPPED | OUTPATIENT
Start: 2018-02-21 | End: 2020-02-26 | Stop reason: SDUPTHER

## 2018-02-20 RX ORDER — BISACODYL 10 MG
10 SUPPOSITORY, RECTAL RECTAL ONCE
Status: DISCONTINUED | OUTPATIENT
Start: 2018-02-20 | End: 2018-02-20

## 2018-02-20 RX ORDER — CIPROFLOXACIN 250 MG/1
250 TABLET, FILM COATED ORAL EVERY 12 HOURS SCHEDULED
Status: DISCONTINUED | OUTPATIENT
Start: 2018-02-20 | End: 2018-02-20 | Stop reason: HOSPADM

## 2018-02-20 RX ADMIN — BUPROPION HYDROCHLORIDE 100 MG: 100 TABLET, FILM COATED ORAL at 08:35

## 2018-02-20 RX ADMIN — PROGESTERONE 100 MG: 100 CAPSULE, LIQUID FILLED ORAL at 17:27

## 2018-02-20 RX ADMIN — OXYCODONE HYDROCHLORIDE 5 MG: 5 TABLET ORAL at 12:59

## 2018-02-20 RX ADMIN — LIOTHYRONINE SODIUM 5 MCG: 5 TABLET ORAL at 06:14

## 2018-02-20 RX ADMIN — PROGESTERONE 100 MG: 100 CAPSULE, LIQUID FILLED ORAL at 08:35

## 2018-02-20 RX ADMIN — OXYCODONE HYDROCHLORIDE 5 MG: 5 TABLET ORAL at 06:15

## 2018-02-20 RX ADMIN — CIPROFLOXACIN HYDROCHLORIDE 250 MG: 250 TABLET, FILM COATED ORAL at 13:23

## 2018-02-20 RX ADMIN — OXYCODONE HYDROCHLORIDE 5 MG: 5 TABLET ORAL at 17:20

## 2018-02-20 RX ADMIN — ENOXAPARIN SODIUM 40 MG: 40 INJECTION SUBCUTANEOUS at 08:35

## 2018-02-20 RX ADMIN — LEVOTHYROXINE SODIUM 50 MCG: 50 TABLET ORAL at 06:14

## 2018-02-20 RX ADMIN — OXYCODONE HYDROCHLORIDE 5 MG: 5 TABLET ORAL at 00:51

## 2018-02-20 NOTE — ASSESSMENT & PLAN NOTE
· S/p mechanical fall  · For discharge to [place] [address] at [address] based on insurance approval  · Continue supportive treatment for pain/bowel regimen

## 2018-02-20 NOTE — DISCHARGE SUMMARY
Discharge- Shalom Elmore 1955, 63 y o  female MRN: [id number]    Unit/Bed#: E2 -01 Encounter: [id number]    Primary Care Provider: Charleen Gandhi DO   Date and time admitted to hospital: 2/15/2018  3:46 PM        * Fracture of multiple pubic rami ([address] )   Assessment & Plan    · S/p mechanical fall  · For discharge to [address] [place] [address] at [address] based on insurance approval  · Continue supportive treatment for pain/bowel regimen        Acute cystitis without hematuria   Assessment & Plan    · Started on Ciprofloxacin for 3 days course  · UA positive, urine cultures pending at time of discharge        Hypothyroid   Assessment & Plan    · Controlled  · Continue levothyroxine           Depression   Assessment & Plan    · No suicidal ideation  · Discharged on current medications        Hx of breast cancer   Assessment & Plan    · S/p breast surgery  · On estridiol and progesterone therapy            Resolved Problems  Date Reviewed: 2/19/2018    None          Consultations During Hospital Stay:  · Orthopedic Surgery    Procedures Performed:     · None    Significant Findings / Test Results:     · CT pelvis wo contrast  Impression:       Nondisplaced right superior and inferior pubic rami fractures  Incidental Findings:   None    Test Results Pending at Discharge (will require follow up):   · Urine cultures     Outpatient Tests Requested:  · None    Complications:  None    Reason for Admission: s/p mechanical fall    Hospital Course:     Shalom Elmore is a 63 y o  female patient who originally presented to the hospital on 2/15/2018 due to  Mechanical fall at patient's home where she slipped in the dark and fell down 18 steps  She was found to have multiple right pubic rami fractures  She was evaluated by Orthopedic surgery and was recommended conservative management  The patient showed some improvement with physical therapy and was recommended short-term rehab for further physical and occupationaltherapy  Patient's pain was mostly controlled throughout her hospitalization  During patient's hospital stay, she developed urinary frequency  Which she stated was unusual for her  Her urinalysis was consistent with a UTI  She was started on ciprofloxacin in her urine cultures were pending at the time of her discharge  Those will be followed and if not sensitive to ciprofloxacin patient's facility will be contacted  The patient was discharged in a stable condition  Her vital signs were normal and stable and she was afebrile  She had appropriate oral intake  Post discharge planning was discussed the patient and she verbalized understanding  Please see above list of diagnoses and related plan for additional information  Condition at Discharge: stable     Discharge Day Visit / Exam:     Subjective:   Patient seen and examined  She states that she is continuing to have urinary urgency frequency but no dysuria  She states that her right-sided pelvic pain is controlled for as long as she does not try to move  She is able to ambulate to the bathroom in her room  She denies chest pain, shortness of breath or palpitations  She denies abdominal pain, nausea, vomiting, diarrhea or constipation  She also denies dizziness or lightheadedness  Vitals: Blood Pressure: 114/54 (02/20/18 1521)  Pulse: 88 (02/20/18 1521)  Temperature: 97 7 °F (36 5 °C) (02/20/18 1521)  Temp Source: Tympanic (02/20/18 1521)  Respirations: 18 (02/20/18 1521)  Weight - Scale: 51 7 kg (114 lb) (02/15/18 1556)  SpO2: 97 % (02/20/18 1521)     Exam:     Physical Exam   Constitutional: She is oriented to person, place, and time  No distress  HENT:   Head: Normocephalic and atraumatic  Eyes: Conjunctivae are normal    Neck: No JVD present  Cardiovascular: Normal rate and regular rhythm  Exam reveals no gallop and no friction rub  No murmur heard  Pulmonary/Chest: Effort normal  No respiratory distress  She has no wheezes   She has no rales  Abdominal: Soft  She exhibits no distension  There is no tenderness  There is no guarding  Musculoskeletal: She exhibits no edema  Loss of right hip ROM due to pain   Neurological: She is alert and oriented to person, place, and time  Skin: Skin is warm and dry  Psychiatric: She has a normal mood and affect  Discussion with Family: Patient    Discharge instructions/Information to patient and family:   See after visit summary for information provided to patient and family  Provisions for Follow-Up Care:  See after visit summary for information related to follow-up care and any pertinent home health orders  Disposition:     Karena Sapp (see below)    For Discharges to [facility] SNF:   · [facility] - Not Applicable to this Patient    Planned Readmission: No     Discharge Statement:  I spent 35 minutes discharging the patient  This time was spent on the day of discharge  I had direct contact with the patient on the day of discharge  Greater than 50% of the total time was spent examining patient, answering all patient questions, arranging and discussing plan of care with patient as well as directly providing post-discharge instructions  Additional time then spent on discharge activities  Discharge Medications:  See after visit summary for reconciled discharge medications provided to patient and family        ** Please Note: This note has been constructed using a voice recognition system **

## 2018-02-20 NOTE — ASSESSMENT & PLAN NOTE
· Started on Ciprofloxacin for 3 days course  · UA positive, urine cultures pending at time of discharge

## 2018-02-20 NOTE — CASE MANAGEMENT
Continued Stay Review    Date:    2/20/2018    Vital Signs: /58 (BP Location: Left arm)   Pulse 79   Temp (!) 97 2 °F (36 2 °C) (Tympanic)   Resp 16   Wt 51 7 kg (114 lb)   SpO2 96%   BMI 18 40 kg/m²     Medications:   Scheduled Meds:   Current Facility-Administered Medications:  acetaminophen 650 mg Oral Q6H PRN James Freitas PA-C   buPROPion 100 mg Oral Daily James Freitas PA-C   diazepam 5 mg Oral Q12H PRN Cy RITA Freitas   enoxaparin 40 mg Subcutaneous Daily James Freitas PA-C   estradiol 1 patch Transdermal Once per day on Mon Kaylee Fisher MD   HYDROmorphone 1 mg Intravenous Q3H PRN James Freitas PA-C   levothyroxine 50 mcg Oral Early Morning James Freitas PA-C   liothyronine 5 mcg Oral Early Morning James Freitas PA-C   magnesium hydroxide 30 mL Oral Daily PRN Kaylee Fisher MD   oxyCODONE 5 mg Oral Q4H PRN James Freitas PA-C   pneumococcal 13-valent conjugate vaccine 0 5 mL Intramuscular Prior to discharge Nell Arita DO   progesterone 100 mg Oral BID James Freitas PA-C   promethazine 12 5 mg Intravenous Q4H PRN Kecia Coronel MD   senna-docusate sodium 1 tablet Oral HS Kaylee Fisher MD   temazepam 15 mg Oral HS James Freitas PA-C     Continuous Infusions:    PRN Meds:   acetaminophen    diazepam    HYDROmorphone    magnesium hydroxide    oxyCODONE    pneumococcal 13-valent conjugate vaccine    promethazine    Abnormal Labs/Diagnostic Results:   Labs  Unremarkable  2/20    Age/Sex: 63 y o  female     Assessment/Plan:      Fracture of multiple pubic rami (HCC)   Assessment & Plan     · Nondisplaced right superior and inferior pubic rami fractures  · S/p mechanical fall  · Arrangement for inpatient acute rehab vs STR based on insurance approval  · Continue PT/OT, supportive treatment for pain/bowel regimen          Hypothyroid   Assessment & Plan     · Continue levothyroxine   · Check TSH          Depression   Assessment & Plan     · Controlled  · Continue home regimen     Hx of breast cancer   Assessment & Plan     · S/p breast surgery  · On estridiol and progesterone therapy            The patient will continue to require additional inpatient hospital stay due to need for close observation, PT/OT, discharge planning    Discharge Plan:    Rehab      Thank you,  [address] [facility] in the [facility] by [address] for 2017  Network Utilization Review Department  Phone: [phone]; Fax [phone]  ATTENTION: The Network Utilization Review Department is now centralized for our 7 Facilities  Make a note that we have a new phone and fax numbers for our Department  Please call with any questions or concerns to [phone] and carefully follow the prompts so that you are directed to the right person  All voicemails are confidential  Fax any determinations, approvals, denials, and requests for initial or continue stay review clinical to [phone]  Due to HIGH CALL volume, it would be easier if you could please send faxed requests to expedite your requests and in part, help us provide discharge notifications faster

## 2018-02-20 NOTE — SOCIAL WORK
CM met with pt at bedside to plan for d/c  Pt lives with her  in [place] story [place]  Pt had a major fall down steps  Pt wanted to go to acute rehab at [place] [address] or [facility]  [facility] submitted for auth however insurance has denied; discussed with Dr Luis Rader, appeal will not be pursued at this time as insurance is willing to auth sub-acute rehab  Pt is accepted at [address] [facility]; Auth # [id number] per [facility] liaison  Per [facility]; she has a $1546 deductible of which $250 was met, and she has an out of pocket max of $7350 of which $449 was met  Her SNF coverage will be at 80% until she meets the OOP max  Pt aware and in agreement  [facility] transport request form completed    Pt they arranged transport with [facility] at 6:00 PM

## 2018-02-20 NOTE — PLAN OF CARE
Problem: DISCHARGE PLANNING - CARE MANAGEMENT  Goal: Discharge to post-acute care or home with appropriate resources  INTERVENTIONS:  - Conduct assessment to determine patient/family and health care team treatment goals, and need for post-acute services based on payer coverage, community resources, and patient preferences, and barriers to discharge  - Address psychosocial, clinical, and financial barriers to discharge as identified in assessment in conjunction with the patient/family and health care team  - Arrange appropriate level of post-acute services according to patients   needs and preference and payer coverage in collaboration with the physician and health care team  - Communicate with and update the patient/family, physician, and health care team regarding progress on the discharge plan  - Arrange appropriate transportation to post-acute venues  Outcome: Progressing  Will plan for transfer to [address] later today    Comments: Transfer to [address] later today

## 2018-02-21 ENCOUNTER — TELEPHONE (OUTPATIENT)
Dept: FAMILY MEDICINE CLINIC | Facility: CLINIC | Age: 63
End: 2018-02-21

## 2018-02-21 LAB — BACTERIA UR CULT: ABNORMAL

## 2018-02-21 NOTE — CASE MANAGEMENT
Notification of Discharge  This is a Notification of Discharge from our facility [address]  Please be advised that this patient has been discharge from our facility  Below you will find the admission and discharge date and time including the patients disposition  PRESENTATION DATE: 2/15/2018  3:46 PM  IP ADMISSION DATE: 2/16/18 1434  DISCHARGE DATE: 2/20/2018  6:32 PM  DISPOSITION: Released to SNF/TCU/SNU [address] [place] [address] in the [facility] by Dominick Albarado for 2017  Network Utilization Review Department  Phone: [phone]; Fax [phone]  ATTENTION: The Network Utilization Review Department is now centralized for our 7 Facilities  Make a note that we have a new phone and fax numbers for our Department  Please call with any questions or concerns to [phone] and carefully follow the prompts so that you are directed to the right person  All voicemails are confidential  Fax any determinations, approvals, denials, and requests for initial or continue stay review clinical to [phone]  Due to HIGH CALL volume, it would be easier if you could please send faxed requests to expedite your requests and in part, help us provide discharge notifications faster

## 2018-02-22 NOTE — PROGRESS NOTES
Assessment/Plan:      Diagnoses and all orders for this visit:    Arthralgia of hip, unspecified laterality  -     XR hip/pelv 2-3 vws right; Future    Other closed fracture of right ischium, initial encounter [facility])        Patient Instructions   Pt to ED for further evaluation    Subjective:     Patient ID: Waqar Koehler is a 63 y o  female  Chief Complaint   Patient presents with   Jono Foley     last night     Fall   The accident occurred 12 to 24 hours ago  Fall occurred: Pt states she "got turned around" while walking in her house in the dark and fell down the steps onto her right side  She landed on hard floor  There was no blood loss  Point of impact: right side  The pain is present in the right hip  The pain is moderate  Exacerbated by: leg movement  Pertinent negatives include no abdominal pain, bowel incontinence, fever, headaches, hearing loss, hematuria, loss of consciousness, nausea, numbness, tingling, visual change or vomiting  She has tried nothing for the symptoms  Review of Systems   Constitutional: Negative for fever  Gastrointestinal: Negative for abdominal pain, bowel incontinence, nausea and vomiting  Genitourinary: Negative for hematuria  Musculoskeletal: Positive for arthralgias and gait problem (unable to weight bear with R leg)  Negative for back pain, joint swelling, myalgias, neck pain and neck stiffness  Neurological: Positive for weakness (of right leg)  Negative for tingling, loss of consciousness, numbness and headaches  Objective:    /70 (BP Location: Right arm, Patient Position: Sitting, Cuff Size: Standard)   Pulse 92   Temp 97 9 °F (36 6 °C) (Tympanic)   Resp 20   Ht 5' 6" (1 676 m)   Wt 51 7 kg (114 lb)   SpO2 98%   BMI 18 40 kg/m²      Physical Exam   Constitutional: She appears well-developed and well-nourished  Cardiovascular: Normal rate, regular rhythm, normal heart sounds and intact distal pulses    Exam reveals no gallop and no friction rub     No murmur heard  Pulmonary/Chest: Effort normal and breath sounds normal  No respiratory distress  She has no wheezes  She has no rales  She exhibits no tenderness  Musculoskeletal:        Right hip: She exhibits decreased range of motion (unable to actively move leg in any direction; severe pain felt upon passive motion) and tenderness  She exhibits normal strength and no deformity (no inversion or eversion of leg)     Skin:   Diffuse blue ecchymosis to right side

## 2018-02-28 ENCOUNTER — TRANSITIONAL CARE MANAGEMENT (OUTPATIENT)
Dept: FAMILY MEDICINE CLINIC | Facility: CLINIC | Age: 63
End: 2018-02-28

## 2018-03-02 ENCOUNTER — LAB (OUTPATIENT)
Dept: LAB | Facility: CLINIC | Age: 63
End: 2018-03-02
Payer: COMMERCIAL

## 2018-03-02 ENCOUNTER — TRANSCRIBE ORDERS (OUTPATIENT)
Dept: URGENT CARE | Facility: CLINIC | Age: 63
End: 2018-03-02

## 2018-03-02 ENCOUNTER — OFFICE VISIT (OUTPATIENT)
Dept: FAMILY MEDICINE CLINIC | Facility: CLINIC | Age: 63
End: 2018-03-02
Payer: COMMERCIAL

## 2018-03-02 VITALS
BODY MASS INDEX: 18.32 KG/M2 | WEIGHT: 114 LBS | DIASTOLIC BLOOD PRESSURE: 64 MMHG | HEIGHT: 66 IN | SYSTOLIC BLOOD PRESSURE: 114 MMHG

## 2018-03-02 DIAGNOSIS — F32.A DEPRESSION, UNSPECIFIED DEPRESSION TYPE: ICD-10-CM

## 2018-03-02 DIAGNOSIS — E03.9 ACQUIRED HYPOTHYROIDISM: ICD-10-CM

## 2018-03-02 DIAGNOSIS — Z85.3 HX OF BREAST CANCER: ICD-10-CM

## 2018-03-02 DIAGNOSIS — N30.00 ACUTE CYSTITIS WITHOUT HEMATURIA: ICD-10-CM

## 2018-03-02 DIAGNOSIS — S32.591S: Primary | ICD-10-CM

## 2018-03-02 DIAGNOSIS — S32.591D CLOSED FRACTURE OF MULTIPLE RAMI OF RIGHT PUBIS WITH ROUTINE HEALING, SUBSEQUENT ENCOUNTER: ICD-10-CM

## 2018-03-02 DIAGNOSIS — B02.9 HERPES ZOSTER WITHOUT COMPLICATION: ICD-10-CM

## 2018-03-02 LAB
BILIRUB UR QL STRIP: NEGATIVE
CLARITY UR: CLEAR
COLOR UR: YELLOW
GLUCOSE UR STRIP-MCNC: NEGATIVE MG/DL
HGB UR QL STRIP.AUTO: NEGATIVE
KETONES UR STRIP-MCNC: NEGATIVE MG/DL
LEUKOCYTE ESTERASE UR QL STRIP: NEGATIVE
NITRITE UR QL STRIP: NEGATIVE
PH UR STRIP.AUTO: 6 [PH] (ref 4.5–8)
PROT UR STRIP-MCNC: NEGATIVE MG/DL
SP GR UR STRIP.AUTO: 1.02 (ref 1–1.03)
UROBILINOGEN UR QL STRIP.AUTO: 0.2 E.U./DL

## 2018-03-02 PROCEDURE — 99496 TRANSJ CARE MGMT HIGH F2F 7D: CPT | Performed by: FAMILY MEDICINE

## 2018-03-02 PROCEDURE — 81003 URINALYSIS AUTO W/O SCOPE: CPT | Performed by: FAMILY MEDICINE

## 2018-03-02 RX ORDER — OXYCODONE HYDROCHLORIDE 5 MG/1
5 TABLET ORAL EVERY 4 HOURS PRN
Qty: 30 TABLET | Refills: 0 | Status: SHIPPED | OUTPATIENT
Start: 2018-03-02 | End: 2018-03-12

## 2018-03-02 RX ORDER — VALACYCLOVIR HYDROCHLORIDE 1 G/1
1000 TABLET, FILM COATED ORAL 3 TIMES DAILY
Qty: 21 TABLET | Refills: 0 | Status: SHIPPED | OUTPATIENT
Start: 2018-03-02 | End: 2018-04-11

## 2018-03-02 RX ORDER — MORPHINE SULFATE 20 MG/1
20 CAPSULE, EXTENDED RELEASE ORAL DAILY
Qty: 30 CAPSULE | Refills: 0 | Status: SHIPPED | OUTPATIENT
Start: 2018-03-02 | End: 2018-03-12

## 2018-03-02 NOTE — ASSESSMENT & PLAN NOTE
Had UTI with E coli in the hospital   Currently no symptoms  Patient is visiting nurse wish to recheck UA to be sure UTI is resolved

## 2018-03-02 NOTE — ASSESSMENT & PLAN NOTE
Currently using home physical therapy  Still complains of pain and needing narcotic pain medication  Hoping to wean within the next 2 weeks

## 2018-03-02 NOTE — PROGRESS NOTES
Assessment/Plan:    We did discuss her narcotic use  She is hoping to wean within the next 2 weeks  I did refill her short-acting oxycodone use to use every 4 hours as needed for pain dispense 30 with no refills  I did write for morphine sulfate long-acting  To take 20 mg daily  Will follow up in 1 month  Hopefully she will be able to wean from the medication by then  Hypothyroid  Controlled with levothyroxine and Cytomel  Fracture of multiple pubic rami (HCC)    Currently using home physical therapy  Still complains of pain and needing narcotic pain medication  Hoping to wean within the next 2 weeks  Acute cystitis without hematuria    Had UTI with E coli in the hospital   Currently no symptoms  Patient is visiting nurse wish to recheck UA to be sure UTI is resolved  Herpes zoster without complication    Complains of painful burning rash on posterior right thigh  Diagnoses and all orders for this visit:    Closed fracture of multiple rami of right pubis, sequela  -     morphine (JAYMIE) 20 mg 24 hr capsule; Take 1 capsule (20 mg total) by mouth daily for 30 days Max Daily Amount: 20 mg    Acquired hypothyroidism    Acute cystitis without hematuria  -     UA w Reflex to Microscopic w Reflex to Culture    Hx of breast cancer    Depression, unspecified depression type    Herpes zoster without complication  -     valACYclovir (VALTREX) 1,000 mg tablet; Take 1 tablet (1,000 mg total) by mouth 3 (three) times a day for 7 days    Closed fracture of multiple rami of right pubis with routine healing, subsequent encounter  -     oxyCODONE (ROXICODONE) 5 mg immediate release tablet; Take 1 tablet (5 mg total) by mouth every 4 (four) hours as needed for moderate pain for up to 10 days Max Daily Amount: 30 mg          Subjective:      Patient ID: Goldie Gallegos is a 63 y o  female      HPI    The following portions of the patient's history were reviewed and updated as appropriate: allergies, current medications, past family history, past medical history, past social history, past surgical history and problem list     Review of Systems   Constitutional: Positive for activity change  HENT: Negative  Eyes: Negative  Respiratory: Negative  Negative for cough and shortness of breath  Cardiovascular: Negative  Negative for chest pain  Gastrointestinal: Negative  Endocrine: Negative  Genitourinary: Negative  Negative for difficulty urinating, dysuria, flank pain, frequency and hematuria  Musculoskeletal: Positive for gait problem  Negative for joint swelling  Fractured right pelvis  Able to ambulate limitedly with 2 crutches  Needs assistance for stairs and showers  Skin: Positive for rash  Allergic/Immunologic: Negative  Neurological: Positive for weakness  Negative for dizziness and syncope  Hematological: Negative  Psychiatric/Behavioral: Negative  Objective:      /64 (BP Location: Left arm, Patient Position: Sitting, Cuff Size: Standard)   Ht 5' 6" (1 676 m)   Wt 51 7 kg (114 lb)   BMI 18 40 kg/m²          Physical Exam   Constitutional: She is oriented to person, place, and time  She appears well-developed  No distress  HENT:   Head: Normocephalic and atraumatic  Right Ear: External ear normal    Left Ear: External ear normal    Eyes: Conjunctivae and EOM are normal  Pupils are equal, round, and reactive to light  Neck: Normal range of motion  Neck supple  Cardiovascular: Normal rate and regular rhythm  Pulmonary/Chest: Effort normal and breath sounds normal    Abdominal: Soft  The abdomen is soft  There is some fullness in the lower quadrants  She does suffer from constipation secondary to narcotic use  Musculoskeletal: She exhibits no deformity  Healing pelvic fracture  Not able to ambulate without crutches  Still has tenderness in the right pelvic area  Neurological: She is alert and oriented to person, place, and time  Skin: Skin is warm  She is not diaphoretic  Examination of the area of the right posterior thigh shows no obvious lesions or rash  There is some erythema in that area  Psychiatric: She has a normal mood and affect  Her behavior is normal    Nursing note and vitals reviewed

## 2018-03-08 ENCOUNTER — TELEPHONE (OUTPATIENT)
Dept: FAMILY MEDICINE CLINIC | Facility: CLINIC | Age: 63
End: 2018-03-08

## 2018-03-09 NOTE — ASSESSMENT & PLAN NOTE
Subjective   Irving Nguyễn reports:the shoulder is moving better, still having problems with ER  Objective   PROM: Left shoulder flexion 155, abduction 125, ER 75 ERP  AROM: Left shoulder Flexion 145, abduction 110, ER 45   STRENGTH:  Left shoulder flexion 3+/5, Abduction 3+/5, ER 3+/5, IR 4/5    See Exercise, Manual, and Modality Logs for complete treatment.       Assessment/Plan  S/P large RTC repair, progressing as expected or better.  Progress per Plan of Care and Progress strengthening /stabilization /functional activity            Manual Therapy:    15     mins  61114;  Therapeutic Exercise:    40     mins  09015;     Neuromuscular Annie:        mins  00313;    Therapeutic Activity:          mins  96700;     Gait Training:           mins  59083;     Ultrasound:          mins  56566;   Iontophoresis          mins  13487   Electrical Stimulation:         mins  64883 ( );  Dry Needling          mins self-pay  Fluidotherapy          mins 17821    Timed Treatment:   55   mins   Total Treatment:     55   mins    Manav Dickens, PT  Physical Therapist   Controlled with levothyroxine and Cytomel

## 2018-03-12 ENCOUNTER — TELEPHONE (OUTPATIENT)
Dept: FAMILY MEDICINE CLINIC | Facility: CLINIC | Age: 63
End: 2018-03-12

## 2018-03-12 DIAGNOSIS — S32.591S: Primary | ICD-10-CM

## 2018-03-12 RX ORDER — MORPHINE SULFATE 10 MG/1
10 CAPSULE, EXTENDED RELEASE ORAL DAILY
Qty: 20 CAPSULE | Refills: 0 | Status: SHIPPED | OUTPATIENT
Start: 2018-03-12 | End: 2018-03-15 | Stop reason: SDUPTHER

## 2018-03-12 NOTE — TELEPHONE ENCOUNTER
She is no longer taking the oxy  She is just taking the Morphine sulfate only  Can we lower the dose of that?

## 2018-03-15 DIAGNOSIS — S32.591S: ICD-10-CM

## 2018-03-15 RX ORDER — MORPHINE SULFATE 10 MG/1
10 CAPSULE, EXTENDED RELEASE ORAL DAILY
Qty: 20 CAPSULE | Refills: 0 | Status: SHIPPED | OUTPATIENT
Start: 2018-03-15 | End: 2018-04-04

## 2018-04-03 DIAGNOSIS — F41.1 GENERALIZED ANXIETY DISORDER: Primary | ICD-10-CM

## 2018-04-03 DIAGNOSIS — F43.10 POST TRAUMATIC STRESS DISORDER: ICD-10-CM

## 2018-04-03 RX ORDER — DIAZEPAM 5 MG/1
5 TABLET ORAL EVERY 12 HOURS PRN
Qty: 30 TABLET | Refills: 1 | Status: SHIPPED | OUTPATIENT
Start: 2018-04-03 | End: 2018-04-11 | Stop reason: SDUPTHER

## 2018-04-11 ENCOUNTER — OFFICE VISIT (OUTPATIENT)
Dept: FAMILY MEDICINE CLINIC | Facility: CLINIC | Age: 63
End: 2018-04-11
Payer: COMMERCIAL

## 2018-04-11 VITALS
WEIGHT: 112.8 LBS | BODY MASS INDEX: 18.13 KG/M2 | DIASTOLIC BLOOD PRESSURE: 86 MMHG | SYSTOLIC BLOOD PRESSURE: 140 MMHG | TEMPERATURE: 98.2 F | HEIGHT: 66 IN

## 2018-04-11 DIAGNOSIS — Z13.220 SCREENING CHOLESTEROL LEVEL: ICD-10-CM

## 2018-04-11 DIAGNOSIS — F43.10 POST TRAUMATIC STRESS DISORDER: ICD-10-CM

## 2018-04-11 DIAGNOSIS — F41.1 GENERALIZED ANXIETY DISORDER: ICD-10-CM

## 2018-04-11 DIAGNOSIS — K59.1 FUNCTIONAL DIARRHEA: ICD-10-CM

## 2018-04-11 DIAGNOSIS — S32.591S: Primary | ICD-10-CM

## 2018-04-11 DIAGNOSIS — E03.9 ACQUIRED HYPOTHYROIDISM: ICD-10-CM

## 2018-04-11 DIAGNOSIS — Z00.00 WELLNESS EXAMINATION: ICD-10-CM

## 2018-04-11 DIAGNOSIS — G47.09 OTHER INSOMNIA: ICD-10-CM

## 2018-04-11 PROCEDURE — 99214 OFFICE O/P EST MOD 30 MIN: CPT | Performed by: NURSE PRACTITIONER

## 2018-04-11 RX ORDER — LIOTHYRONINE SODIUM 5 UG/1
5 TABLET ORAL DAILY
Qty: 30 TABLET | Refills: 3 | Status: SHIPPED | OUTPATIENT
Start: 2018-04-11 | End: 2018-08-04 | Stop reason: SDUPTHER

## 2018-04-11 RX ORDER — LEVOTHYROXINE SODIUM 0.05 MG/1
50 TABLET ORAL DAILY
Qty: 30 TABLET | Refills: 3 | Status: SHIPPED | OUTPATIENT
Start: 2018-04-11 | End: 2018-08-04 | Stop reason: SDUPTHER

## 2018-04-11 RX ORDER — DIAZEPAM 5 MG/1
5 TABLET ORAL EVERY 12 HOURS PRN
Qty: 180 TABLET | Refills: 1 | Status: SHIPPED | OUTPATIENT
Start: 2018-04-11 | End: 2018-10-29 | Stop reason: SDUPTHER

## 2018-04-11 RX ORDER — TEMAZEPAM 15 MG/1
15 CAPSULE ORAL
Qty: 90 CAPSULE | Refills: 1 | Status: SHIPPED | OUTPATIENT
Start: 2018-04-11 | End: 2018-10-29 | Stop reason: SDUPTHER

## 2018-04-11 RX ORDER — TEMAZEPAM 15 MG/1
15 CAPSULE ORAL
Refills: 3 | COMMUNITY
Start: 2018-04-03 | End: 2018-04-11 | Stop reason: SDUPTHER

## 2018-04-11 NOTE — PROGRESS NOTES
Assessment/Plan:     See below for plan  Diagnoses and all orders for this visit:    Closed fracture of multiple rami of right pubis, sequela    Generalized anxiety disorder  -     diazepam (VALIUM) 5 mg tablet; Take 1 tablet (5 mg total) by mouth every 12 (twelve) hours as needed for anxiety    Post traumatic stress disorder  -     diazepam (VALIUM) 5 mg tablet; Take 1 tablet (5 mg total) by mouth every 12 (twelve) hours as needed for anxiety    Other insomnia  -     temazepam (RESTORIL) 15 mg capsule; Take 1 capsule (15 mg total) by mouth daily at bedtime as needed for sleep    Acquired hypothyroidism  -     levothyroxine 50 mcg tablet; Take 1 tablet (50 mcg total) by mouth daily  -     liothyronine (CYTOMEL) 5 mcg tablet; Take 1 tablet (5 mcg total) by mouth daily    Wellness examination  -     Lipid panel; Future    Screening cholesterol level  -     Lipid panel; Future    Functional diarrhea    Other orders  -     Discontinue: temazepam (RESTORIL) 15 mg capsule; Take 15 mg by mouth daily at bedtime as needed      Suggested Pepto Bismol tablets for her diarrhea - this may help control symptoms but not cause constipation  If the Korin Ike is not effective should may try Imodium  Refills provided today for Valium and Restoril  Refills also provided for thyroid medication  She is up to date on blood work except for her cholesterol panel - Lipid panel ordered today  Subjective:      Patient ID: Jessica Santillan is a 63 y o  female  Here today for a medication follow-up  She is with a recent past history of a hip fracture, notes it occurred 6 weeks ago  She has been back to work for approximately 3 weeks - 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  She reports she is no longer taking any narcotic pain medication, denies any issues with standing/movement at work  She is having some issue with diarrhea since stopping the Morphine - ongoing for the past 3 weeks    States she goes multiple times a day - large BM in the am, followed by smaller ones throughout the day  She is with a history of Breast CA followed by damage to her thyroid due to radiation  She is currently taking Levothyroxine and Cytomel - is in need of refills today  She is also taking Valium - 5mg BID - due to PTSD and anxiety issues  She would like to not be taking Valium any longer but at this point in time she has weaned herself to the lowest dose and is handling well with it  She is also with a longstanding history of insomnia - currently taking 15 mg Temazepam with effect  Recent CBC, CMP and TSH completed in 02/2018 and within normal ranges  The following portions of the patient's history were reviewed and updated as appropriate: allergies, current medications, past family history, past medical history, past social history, past surgical history and problem list     Review of Systems   Constitutional: Negative  Eyes: Negative  Respiratory: Negative  Cardiovascular: Negative  Gastrointestinal: Positive for diarrhea  Objective:      /86   Temp 98 2 °F (36 8 °C)   Ht 5' 6" (1 676 m)   Wt 51 2 kg (112 lb 12 8 oz)   BMI 18 21 kg/m²          Physical Exam   Constitutional: She is oriented to person, place, and time  She appears well-developed and well-nourished  Neck: Neck supple  Cardiovascular: Normal rate, regular rhythm and normal heart sounds  Pulmonary/Chest: Effort normal and breath sounds normal    Abdominal: Soft  Neurological: She is alert and oriented to person, place, and time  Psychiatric: She has a normal mood and affect   Her behavior is normal

## 2018-05-14 RX ORDER — ZOLMITRIPTAN 5 MG/1
TABLET, FILM COATED ORAL
Refills: 3 | COMMUNITY
Start: 2018-04-14 | End: 2022-04-12

## 2018-05-17 ENCOUNTER — OFFICE VISIT (OUTPATIENT)
Dept: FAMILY MEDICINE CLINIC | Facility: CLINIC | Age: 63
End: 2018-05-17
Payer: COMMERCIAL

## 2018-05-17 VITALS
BODY MASS INDEX: 18 KG/M2 | DIASTOLIC BLOOD PRESSURE: 76 MMHG | WEIGHT: 112 LBS | HEIGHT: 66 IN | SYSTOLIC BLOOD PRESSURE: 124 MMHG

## 2018-05-17 DIAGNOSIS — Z85.3 HX OF BREAST CANCER: ICD-10-CM

## 2018-05-17 DIAGNOSIS — L23.9 ALLERGIC DERMATITIS: ICD-10-CM

## 2018-05-17 DIAGNOSIS — L29.9 ITCHING: ICD-10-CM

## 2018-05-17 DIAGNOSIS — R21 RASH: Primary | ICD-10-CM

## 2018-05-17 DIAGNOSIS — N95.1 MENOPAUSAL STATE: ICD-10-CM

## 2018-05-17 DIAGNOSIS — Z12.11 SCREENING FOR COLON CANCER: ICD-10-CM

## 2018-05-17 DIAGNOSIS — B02.9 HERPES ZOSTER WITHOUT COMPLICATION: ICD-10-CM

## 2018-05-17 PROCEDURE — 99214 OFFICE O/P EST MOD 30 MIN: CPT | Performed by: NURSE PRACTITIONER

## 2018-05-17 RX ORDER — VALACYCLOVIR HYDROCHLORIDE 500 MG/1
500 TABLET, FILM COATED ORAL DAILY
Qty: 30 TABLET | Refills: 0 | Status: SHIPPED | OUTPATIENT
Start: 2018-05-17 | End: 2018-06-13 | Stop reason: SDUPTHER

## 2018-05-17 RX ORDER — MOMETASONE FUROATE 1 MG/G
CREAM TOPICAL DAILY
Qty: 45 G | Refills: 0 | Status: SHIPPED | OUTPATIENT
Start: 2018-05-17 | End: 2018-12-18

## 2018-05-17 RX ORDER — PREDNISONE 1 MG/1
5 TABLET ORAL DAILY
Qty: 6 TABLET | Refills: 0 | Status: SHIPPED | OUTPATIENT
Start: 2018-05-17 | End: 2018-12-18

## 2018-05-17 NOTE — PROGRESS NOTES
Assessment/Plan:       Diagnoses and all orders for this visit:    Rash  -     predniSONE 5 mg tablet; Take 1 tablet (5 mg total) by mouth daily  -     mometasone (ELOCON) 0 1 % cream; Apply topically daily    Itching  -     predniSONE 5 mg tablet; Take 1 tablet (5 mg total) by mouth daily  -     mometasone (ELOCON) 0 1 % cream; Apply topically daily    Allergic dermatitis  -     predniSONE 5 mg tablet; Take 1 tablet (5 mg total) by mouth daily  -     mometasone (ELOCON) 0 1 % cream; Apply topically daily    Herpes zoster without complication  -     valACYclovir (VALTREX) 500 mg tablet; Take 1 tablet (500 mg total) by mouth daily for 30 doses    Hx of breast cancer    Screening for colon cancer  -     Ambulatory referral to Gastroenterology; Future    Menopausal state  -     progesterone (PROMETRIUM) 100 MG capsule; Take 1 capsule (100 mg total) by mouth 2 (two) times a day  -     estradiol (CLIMARA) 0 025 mg/24 hr; Place 1 patch on the skin once a week    Other orders  -     ZOLMitriptan (ZOMIG) 5 MG tablet; TAKE 1 TABLET BY MOUTH AT ONSET OF MIGRAINE, MAY REPEAT IN 2 HOURS      MDM: insect bite vs  Allergic rash  She more than likely picked up something from her dog on the day she noted the rash on him  She reports she is going to clean her home with DME to kill any bugs  She is to take Prednisone daily x 6 days and use the Mometasone cream as needed on the itchy areas twice a day  Hormone replacement medication sent to pharmacy  She had a negative PAP smear last year  She also reports not getting a colonoscopy due to recently breaking her pelvis - new referral to GI made  Additional refill for Valcyclovir provided today as she used the last of it with the onset of the rash  Subjective:      Patient ID: Ranjit Cates is a 66 y o  female  Here today for an acute complaint    Notes a rash for the past few days on her body - legs, abdomen, right upper chest   She reports small red areas, similar to insect bites which have been causing her a lot of itching  She reports she first noted the rash on her dog, put coconut oil on it which caused the rash to go away  She has tried antifungal cream, and also Valcyclovir as she has a hx of cold sore eruptions - she reports not relief  She also has tried Calomine lotion and Benadryl which did not diminish the itching  She is also in need of refills for both her progesterone and Estrogen supplementation  She is also due for breast cancer screening - normally receives a breast MRI due to implants post mastectomy (notes her right implant was crushed with the mammography machine)  She is going to see the surgeon regarding redoing her right implant and reports the screening will need to be done before surgery so she is going to wait until then  She is also in need of a referral for a colonoscopy - notes she received one last year, but then fell and broke her pelvis  She is asking to have another one placed as it has been 11 years since her last colonoscopy  The following portions of the patient's history were reviewed and updated as appropriate: allergies, current medications, past family history, past medical history, past social history, past surgical history and problem list     Review of Systems   Constitutional: Negative  Respiratory: Negative  Cardiovascular: Negative  Objective:      /76 (BP Location: Left arm, Patient Position: Sitting, Cuff Size: Standard)   Ht 5' 6" (1 676 m)   Wt 50 8 kg (112 lb)   BMI 18 08 kg/m²          Physical Exam   Constitutional: She is oriented to person, place, and time  She appears well-developed and well-nourished  HENT:   Head: Normocephalic and atraumatic  Cardiovascular: Normal rate and regular rhythm  Exam reveals no gallop and no friction rub  No murmur heard  Pulmonary/Chest: Effort normal and breath sounds normal  No respiratory distress  She has no wheezes  She has no rales  Neurological: She is alert and oriented to person, place, and time  Skin: Rash noted  Rash is urticarial  Rash is not nodular  Small red areas, not raised, semi-circular noted on right upper chest, left upper frontal leg, left lower abdomen  Dryness noted to some as they are advanced, other appear more red, swollen - largest size approximately 2 x 3 cm  Psychiatric: She has a normal mood and affect   Her behavior is normal

## 2018-06-13 DIAGNOSIS — B02.9 HERPES ZOSTER WITHOUT COMPLICATION: ICD-10-CM

## 2018-06-13 RX ORDER — VALACYCLOVIR HYDROCHLORIDE 500 MG/1
TABLET, FILM COATED ORAL
Qty: 30 TABLET | Refills: 1 | Status: SHIPPED | OUTPATIENT
Start: 2018-06-13 | End: 2018-08-04 | Stop reason: SDUPTHER

## 2018-08-04 DIAGNOSIS — E03.9 ACQUIRED HYPOTHYROIDISM: ICD-10-CM

## 2018-08-04 DIAGNOSIS — B02.9 HERPES ZOSTER WITHOUT COMPLICATION: ICD-10-CM

## 2018-08-06 ENCOUNTER — TELEPHONE (OUTPATIENT)
Dept: FAMILY MEDICINE CLINIC | Facility: CLINIC | Age: 63
End: 2018-08-06

## 2018-08-06 DIAGNOSIS — B02.9 HERPES ZOSTER WITHOUT COMPLICATION: ICD-10-CM

## 2018-08-06 DIAGNOSIS — N95.1 MENOPAUSAL STATE: ICD-10-CM

## 2018-08-06 RX ORDER — VALACYCLOVIR HYDROCHLORIDE 500 MG/1
500 TABLET, FILM COATED ORAL DAILY
Qty: 90 TABLET | Refills: 0 | Status: SHIPPED | OUTPATIENT
Start: 2018-08-06 | End: 2019-10-14

## 2018-08-06 RX ORDER — VALACYCLOVIR HYDROCHLORIDE 500 MG/1
TABLET, FILM COATED ORAL
Qty: 30 TABLET | Refills: 1 | Status: SHIPPED | OUTPATIENT
Start: 2018-08-06 | End: 2018-08-06 | Stop reason: SDUPTHER

## 2018-08-06 RX ORDER — LIOTHYRONINE SODIUM 5 UG/1
TABLET ORAL
Qty: 30 TABLET | Refills: 3 | Status: SHIPPED | OUTPATIENT
Start: 2018-08-06 | End: 2019-07-24 | Stop reason: SDUPTHER

## 2018-08-06 RX ORDER — LEVOTHYROXINE SODIUM 0.05 MG/1
50 TABLET ORAL DAILY
Qty: 30 TABLET | Refills: 3 | Status: SHIPPED | OUTPATIENT
Start: 2018-08-06 | End: 2019-05-20 | Stop reason: SDUPTHER

## 2018-08-08 ENCOUNTER — OFFICE VISIT (OUTPATIENT)
Dept: FAMILY MEDICINE CLINIC | Facility: CLINIC | Age: 63
End: 2018-08-08
Payer: COMMERCIAL

## 2018-08-08 VITALS
BODY MASS INDEX: 19.41 KG/M2 | HEART RATE: 81 BPM | DIASTOLIC BLOOD PRESSURE: 74 MMHG | HEIGHT: 66 IN | WEIGHT: 120.8 LBS | SYSTOLIC BLOOD PRESSURE: 124 MMHG | OXYGEN SATURATION: 97 %

## 2018-08-08 DIAGNOSIS — Z12.11 ENCOUNTER FOR SCREENING FOR MALIGNANT NEOPLASM OF COLON: ICD-10-CM

## 2018-08-08 DIAGNOSIS — H10.021 PINK EYE DISEASE, RIGHT: Primary | ICD-10-CM

## 2018-08-08 PROCEDURE — 99212 OFFICE O/P EST SF 10 MIN: CPT | Performed by: NURSE PRACTITIONER

## 2018-08-08 PROCEDURE — 1036F TOBACCO NON-USER: CPT | Performed by: NURSE PRACTITIONER

## 2018-08-08 PROCEDURE — 3008F BODY MASS INDEX DOCD: CPT | Performed by: NURSE PRACTITIONER

## 2018-08-08 NOTE — PROGRESS NOTES
Assessment/Plan:       Diagnoses and all orders for this visit:    Pink eye disease, right  -     phti-binaegi-nsz-hydrocort (CORTISPORIN) 1 % ointment; Administer to the right eye 3 (three) times a day for 7 days    Encounter for screening for malignant neoplasm of colon  -     Ambulatory referral to Gastroenterology; Future          Subjective:      Patient ID: Ranjit Cates is a 66 y o  female  Here today for an acute complaint  She reports awakening this am with redness, pain, drainage from right eye  She notes she had her grandchildren and their friends over recently and feels she may have contracted it from one of them  Eye Problem    Associated symptoms include an eye discharge and eye redness  The following portions of the patient's history were reviewed and updated as appropriate: allergies, current medications, past family history, past medical history, past social history, past surgical history and problem list     Review of Systems   Eyes: Positive for pain, discharge and redness  Objective:      /74 (BP Location: Left arm, Patient Position: Sitting, Cuff Size: Standard)   Pulse 81   Ht 5' 6" (1 676 m)   Wt 54 8 kg (120 lb 12 8 oz)   SpO2 97%   BMI 19 50 kg/m²          Physical Exam   Eyes: Right eye exhibits discharge  Right conjunctiva is injected  Erythema to right eye and conjunctivae

## 2018-08-14 ENCOUNTER — RX ONLY (RX ONLY)
Age: 63
End: 2018-08-14

## 2018-08-14 ENCOUNTER — DOCTOR'S OFFICE (OUTPATIENT)
Dept: URBAN - METROPOLITAN AREA CLINIC 136 | Facility: CLINIC | Age: 63
Setting detail: OPHTHALMOLOGY
End: 2018-08-14
Payer: COMMERCIAL

## 2018-08-14 DIAGNOSIS — H33.302: ICD-10-CM

## 2018-08-14 DIAGNOSIS — H04.123: ICD-10-CM

## 2018-08-14 DIAGNOSIS — H25.013: ICD-10-CM

## 2018-08-14 PROCEDURE — 92004 COMPRE OPH EXAM NEW PT 1/>: CPT | Performed by: OPHTHALMOLOGY

## 2018-08-14 ASSESSMENT — REFRACTION_CURRENTRX
OS_OVR_VA: 20/
OD_OVR_VA: 20/

## 2018-08-14 ASSESSMENT — SUPERFICIAL PUNCTATE KERATITIS (SPK)
OS_SPK: 1+ 2+
OD_SPK: 1+

## 2018-08-14 ASSESSMENT — REFRACTION_MANIFEST
OD_VA2: 20/
OU_VA: 20/
OD_VA1: 20/
OS_VA1: 20/
OD_VA2: 20/
OS_VA2: 20/
OD_VA3: 20/
OD_VA3: 20/
OS_VA3: 20/
OU_VA: 20/
OS_VA1: 20/
OD_VA2: 20/
OS_VA1: 20/
OD_VA1: 20/
OS_VA3: 20/
OD_VA3: 20/
OS_VA2: 20/
OU_VA: 20/
OS_VA2: 20/
OS_VA3: 20/
OD_VA1: 20/

## 2018-08-14 ASSESSMENT — CONFRONTATIONAL VISUAL FIELD TEST (CVF)
OS_FINDINGS: FULL
OD_FINDINGS: FULL

## 2018-08-14 ASSESSMENT — TEAR BREAK UP TIME (TBUT)
OS_TBUT: 2+
OD_TBUT: 2+

## 2018-08-14 ASSESSMENT — VISUAL ACUITY
OD_BCVA: 20/40-2
OS_BCVA: 20/25

## 2018-09-26 ENCOUNTER — RX ONLY (RX ONLY)
Age: 63
End: 2018-09-26

## 2018-09-26 ENCOUNTER — DOCTOR'S OFFICE (OUTPATIENT)
Dept: URBAN - METROPOLITAN AREA CLINIC 136 | Facility: CLINIC | Age: 63
Setting detail: OPHTHALMOLOGY
End: 2018-09-26
Payer: COMMERCIAL

## 2018-09-26 DIAGNOSIS — H04.122: ICD-10-CM

## 2018-09-26 DIAGNOSIS — H04.123: ICD-10-CM

## 2018-09-26 PROBLEM — H04.121 DRY EYE; RIGHT EYE, LEFT EYE: Status: ACTIVE | Noted: 2018-08-14

## 2018-09-26 PROBLEM — H25.013 CATARACT; BOTH EYES: Status: ACTIVE | Noted: 2018-08-14

## 2018-09-26 PROBLEM — H33.302: Status: ACTIVE | Noted: 2018-08-14

## 2018-09-26 PROCEDURE — 83861 MICROFLUID ANALY TEARS: CPT | Performed by: OPHTHALMOLOGY

## 2018-09-26 PROCEDURE — 92012 INTRM OPH EXAM EST PATIENT: CPT | Performed by: OPHTHALMOLOGY

## 2018-09-26 ASSESSMENT — REFRACTION_MANIFEST
OS_VA3: 20/
OD_VA3: 20/
OD_VA2: 20/
OU_VA: 20/
OS_VA1: 20/
OD_VA1: 20/
OS_VA1: 20/
OD_VA2: 20/
OS_VA2: 20/
OD_VA3: 20/
OD_VA1: 20/
OS_VA2: 20/
OU_VA: 20/
OS_VA3: 20/

## 2018-09-26 ASSESSMENT — REFRACTION_CURRENTRX
OD_OVR_VA: 20/
OD_OVR_VA: 20/
OS_OVR_VA: 20/
OD_OVR_VA: 20/
OS_OVR_VA: 20/
OS_OVR_VA: 20/

## 2018-09-26 ASSESSMENT — TEAR BREAK UP TIME (TBUT)
OD_TBUT: 2+
OS_TBUT: 2+

## 2018-09-26 ASSESSMENT — VISUAL ACUITY
OD_BCVA: 20/50
OS_BCVA: 20/30-1

## 2018-09-26 ASSESSMENT — CONFRONTATIONAL VISUAL FIELD TEST (CVF)
OS_FINDINGS: FULL
OD_FINDINGS: FULL

## 2018-10-29 DIAGNOSIS — F43.10 POST TRAUMATIC STRESS DISORDER: ICD-10-CM

## 2018-10-29 DIAGNOSIS — F41.1 GENERALIZED ANXIETY DISORDER: ICD-10-CM

## 2018-10-29 DIAGNOSIS — G47.09 OTHER INSOMNIA: ICD-10-CM

## 2018-10-29 RX ORDER — DIAZEPAM 5 MG/1
TABLET ORAL
Qty: 180 TABLET | Refills: 1 | Status: SHIPPED | OUTPATIENT
Start: 2018-10-29 | End: 2019-06-01 | Stop reason: SDUPTHER

## 2018-10-29 RX ORDER — TEMAZEPAM 15 MG/1
CAPSULE ORAL
Qty: 90 CAPSULE | Refills: 1 | Status: SHIPPED | OUTPATIENT
Start: 2018-10-29 | End: 2019-05-06

## 2018-11-19 DIAGNOSIS — N95.1 MENOPAUSAL STATE: ICD-10-CM

## 2018-12-18 ENCOUNTER — OFFICE VISIT (OUTPATIENT)
Dept: FAMILY MEDICINE CLINIC | Facility: CLINIC | Age: 63
End: 2018-12-18
Payer: COMMERCIAL

## 2018-12-18 VITALS
DIASTOLIC BLOOD PRESSURE: 76 MMHG | SYSTOLIC BLOOD PRESSURE: 126 MMHG | HEART RATE: 88 BPM | BODY MASS INDEX: 19.13 KG/M2 | TEMPERATURE: 99.9 F | HEIGHT: 66 IN | WEIGHT: 119 LBS | OXYGEN SATURATION: 98 %

## 2018-12-18 DIAGNOSIS — R05.9 COUGH: ICD-10-CM

## 2018-12-18 DIAGNOSIS — Z23 NEEDS FLU SHOT: Primary | ICD-10-CM

## 2018-12-18 DIAGNOSIS — J02.0 ACUTE STREPTOCOCCAL PHARYNGITIS: ICD-10-CM

## 2018-12-18 DIAGNOSIS — R59.0 CERVICAL ADENOPATHY: ICD-10-CM

## 2018-12-18 DIAGNOSIS — R07.0 THROAT PAIN: ICD-10-CM

## 2018-12-18 LAB — S PYO AG THROAT QL: NEGATIVE

## 2018-12-18 PROCEDURE — 99213 OFFICE O/P EST LOW 20 MIN: CPT | Performed by: NURSE PRACTITIONER

## 2018-12-18 PROCEDURE — 3008F BODY MASS INDEX DOCD: CPT | Performed by: NURSE PRACTITIONER

## 2018-12-18 PROCEDURE — 87880 STREP A ASSAY W/OPTIC: CPT | Performed by: NURSE PRACTITIONER

## 2018-12-18 RX ORDER — LEVOFLOXACIN 500 MG/1
500 TABLET, FILM COATED ORAL EVERY 24 HOURS
Qty: 7 TABLET | Refills: 0 | Status: SHIPPED | OUTPATIENT
Start: 2018-12-18 | End: 2018-12-25

## 2018-12-18 NOTE — PROGRESS NOTES
Assessment/Plan:     Diagnoses and all orders for this visit:    Needs flu shot  -     PREFERRED: influenza vaccine, [id number], quadrivalent, recombinant, PF, 0 5 mL, for patients 18 yr+ (FLUBLOK)    Cough  -     levofloxacin (LEVAQUIN) 500 mg tablet; Take 1 tablet (500 mg total) by mouth every 24 hours for 7 days    Throat pain  -     levofloxacin (LEVAQUIN) 500 mg tablet; Take 1 tablet (500 mg total) by mouth every 24 hours for 7 days  -     POCT rapid strepA    Cervical adenopathy    Acute streptococcal pharyngitis    Other orders  -     Cancel: Mammo screening bilateral w 3d & cad; Future  -     Cancel: Hepatitis C antibody; Future  -     Cancel: Occult Blood, Fecal Immunochemical; Future      Possible strep related URI vs  Strep/bronchitis  Abx prescribed  POCT rapid strep negative, but right sided pus pocket noted in back of throat  Also recommended saltwater gargles to help with throat sx  Subjective:      Patient ID: Wendy Jain is a 66 y o  female  Here today for an acute visit  She is with a recent history of cold sx of cough, throat pain developing into laryngitis, cough, fever, body aches, and chills  Reports her  was with a cold which she believes she contracted  She notes of no relief of sx and an increase in her feelings of illness  URI    Associated symptoms include congestion, coughing and a sore throat  The following portions of the patient's history were reviewed and updated as appropriate: allergies, current medications, past family history, past medical history, past social history, past surgical history and problem list     Review of Systems   Constitutional: Positive for chills, diaphoresis and fever  HENT: Positive for congestion, sore throat and voice change  Respiratory: Positive for cough  Musculoskeletal: Positive for myalgias           Objective:      /76 (BP Location: Left arm, Patient Position: Sitting, Cuff Size: Standard)   Pulse 88 Temp 99 9 °F (37 7 °C)   Ht 5' 6" (1 676 m)   Wt 54 kg (119 lb)   SpO2 98%   BMI 19 21 kg/m²          Physical Exam   Constitutional: She is oriented to person, place, and time  She appears well-developed and well-nourished  HENT:   Head: Normocephalic and atraumatic  Mouth/Throat: Uvula is midline  Oropharyngeal exudate present  No posterior oropharyngeal edema, posterior oropharyngeal erythema or tonsillar abscesses  Cardiovascular: Normal rate and regular rhythm  Pulmonary/Chest: She has rales in the right upper field and the left upper field  Neurological: She is alert and oriented to person, place, and time  Vitals reviewed  I have spent 20 minutes with Patient  today in which greater than 50% of this time was spent in counseling/coordination of care regarding Intructions for management, Patient and family education and Impressions

## 2019-01-31 DIAGNOSIS — N95.1 MENOPAUSAL STATE: ICD-10-CM

## 2019-05-06 ENCOUNTER — OFFICE VISIT (OUTPATIENT)
Dept: FAMILY MEDICINE CLINIC | Facility: CLINIC | Age: 64
End: 2019-05-06
Payer: COMMERCIAL

## 2019-05-06 VITALS
SYSTOLIC BLOOD PRESSURE: 130 MMHG | OXYGEN SATURATION: 98 % | BODY MASS INDEX: 19.06 KG/M2 | HEART RATE: 90 BPM | DIASTOLIC BLOOD PRESSURE: 84 MMHG | WEIGHT: 118.6 LBS | HEIGHT: 66 IN

## 2019-05-06 DIAGNOSIS — I89.0 LYMPHEDEMA OF RIGHT ARM: ICD-10-CM
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
## 2019-05-06 DIAGNOSIS — G47.09 OTHER INSOMNIA: ICD-10-CM

## 2019-05-06 PROCEDURE — 10120 INC&RMVL FB SUBQ TISS SMPL: CPT | Performed by: NURSE PRACTITIONER

## 2019-05-06 PROCEDURE — 99213 OFFICE O/P EST LOW 20 MIN: CPT | Performed by: NURSE PRACTITIONER

## 2019-05-06 RX ORDER — TEMAZEPAM 7.5 MG/1
15 CAPSULE ORAL
Qty: 30 CAPSULE | Refills: 0 | Status: SHIPPED | OUTPATIENT
Start: 2019-05-06 | End: 2019-05-06

## 2019-05-06 RX ORDER — DOXYCYCLINE HYCLATE 100 MG
100 TABLET ORAL 2 TIMES DAILY
Qty: 28 TABLET | Refills: 0 | Status: SHIPPED | OUTPATIENT
Start: 2019-05-06 | End: 2019-05-20

## 2019-05-06 RX ORDER — PREDNISONE 10 MG/1
10 TABLET ORAL DAILY
Qty: 5 TABLET | Refills: 0 | Status: SHIPPED | OUTPATIENT
Start: 2019-05-06 | End: 2019-05-11

## 2019-05-06 RX ORDER — TEMAZEPAM 7.5 MG/1
7.5 CAPSULE ORAL
Qty: 30 CAPSULE | Refills: 2 | Status: SHIPPED | OUTPATIENT
Start: 2019-05-06 | End: 2019-06-10 | Stop reason: SDUPTHER

## 2019-05-20 DIAGNOSIS — E03.9 ACQUIRED HYPOTHYROIDISM: ICD-10-CM

## 2019-05-20 RX ORDER — LEVOTHYROXINE SODIUM 0.05 MG/1
50 TABLET ORAL DAILY
Qty: 90 TABLET | Refills: 2 | Status: SHIPPED | OUTPATIENT
Start: 2019-05-20 | End: 2020-02-05

## 2019-05-23 ENCOUNTER — OFFICE VISIT (OUTPATIENT)
Dept: FAMILY MEDICINE CLINIC | Facility: CLINIC | Age: 64
End: 2019-05-23
Payer: COMMERCIAL

## 2019-05-23 VITALS
WEIGHT: 114.4 LBS | OXYGEN SATURATION: 98 % | BODY MASS INDEX: 18.39 KG/M2 | HEART RATE: 86 BPM | HEIGHT: 66 IN | SYSTOLIC BLOOD PRESSURE: 128 MMHG | DIASTOLIC BLOOD PRESSURE: 80 MMHG

## 2019-05-23 DIAGNOSIS — Z11.59 ENCOUNTER FOR HEPATITIS C SCREENING TEST FOR LOW RISK PATIENT: ICD-10-CM

## 2019-05-23 DIAGNOSIS — Z00.00 WELLNESS EXAMINATION: ICD-10-CM

## 2019-05-23 DIAGNOSIS — I89.0 LYMPHEDEMA OF RIGHT ARM: ICD-10-CM

## 2019-05-23 DIAGNOSIS — E03.9 ACQUIRED HYPOTHYROIDISM: ICD-10-CM

## 2019-05-23 DIAGNOSIS — Z12.11 ENCOUNTER FOR SCREENING COLONOSCOPY: Primary | ICD-10-CM

## 2019-05-23 DIAGNOSIS — M79.89 SWELLING OF ARM: ICD-10-CM

## 2019-05-23 DIAGNOSIS — Z90.11 H/O RIGHT MASTECTOMY: ICD-10-CM

## 2019-05-23 PROCEDURE — 99213 OFFICE O/P EST LOW 20 MIN: CPT | Performed by: NURSE PRACTITIONER

## 2019-05-24 ENCOUNTER — HOSPITAL ENCOUNTER (OUTPATIENT)
Dept: ULTRASOUND IMAGING | Facility: MEDICAL CENTER | Age: 64
Discharge: HOME | End: 2019-05-24
Payer: COMMERCIAL

## 2019-05-24 ENCOUNTER — APPOINTMENT (OUTPATIENT)
Dept: LAB | Facility: MEDICAL CENTER | Age: 64
End: 2019-05-24
Payer: COMMERCIAL

## 2019-05-24 DIAGNOSIS — Z00.00 WELLNESS EXAMINATION: ICD-10-CM

## 2019-05-24 DIAGNOSIS — I89.0 LYMPHEDEMA OF RIGHT ARM: ICD-10-CM

## 2019-05-24 DIAGNOSIS — M79.89 SWELLING OF ARM: ICD-10-CM

## 2019-05-24 DIAGNOSIS — E03.9 ACQUIRED HYPOTHYROIDISM: ICD-10-CM

## 2019-05-24 DIAGNOSIS — Z11.59 ENCOUNTER FOR HEPATITIS C SCREENING TEST FOR LOW RISK PATIENT: ICD-10-CM

## 2019-05-24 LAB
ALBUMIN SERPL BCP-MCNC: 4.1 G/DL (ref 3.5–5)
ALP SERPL-CCNC: 95 U/L (ref 46–116)
ALT SERPL W P-5'-P-CCNC: 15 U/L (ref 12–78)
ANION GAP SERPL CALCULATED.3IONS-SCNC: 6 MMOL/L (ref 4–13)
AST SERPL W P-5'-P-CCNC: 13 U/L (ref 5–45)
BASOPHILS # BLD AUTO: 0.07 THOUSANDS/ΜL (ref 0–0.1)
BASOPHILS NFR BLD AUTO: 1 % (ref 0–1)
BILIRUB SERPL-MCNC: 0.42 MG/DL (ref 0.2–1)
BUN SERPL-MCNC: 11 MG/DL (ref 5–25)
CALCIUM SERPL-MCNC: 9.2 MG/DL (ref 8.3–10.1)
CHLORIDE SERPL-SCNC: 108 MMOL/L (ref 100–108)
CHOLEST SERPL-MCNC: 229 MG/DL (ref 50–200)
CO2 SERPL-SCNC: 28 MMOL/L (ref 21–32)
CREAT SERPL-MCNC: 0.88 MG/DL (ref 0.6–1.3)
CRP SERPL QL: 11.4 MG/L
EOSINOPHIL # BLD AUTO: 0 THOUSAND/ΜL (ref 0–0.61)
EOSINOPHIL NFR BLD AUTO: 0 % (ref 0–6)
ERYTHROCYTE [DISTWIDTH] IN BLOOD BY AUTOMATED COUNT: 13.2 % (ref 11.6–15.1)
GFR SERPL CREATININE-BSD FRML MDRD: 71 ML/MIN/1.73SQ M
GLUCOSE P FAST SERPL-MCNC: 82 MG/DL (ref 65–99)
HCT VFR BLD AUTO: 43.2 % (ref 34.8–46.1)
HDLC SERPL-MCNC: 126 MG/DL (ref 40–60)
HGB BLD-MCNC: 14.1 G/DL (ref 11.5–15.4)
IMM GRANULOCYTES # BLD AUTO: 0.02 THOUSAND/UL (ref 0–0.2)
IMM GRANULOCYTES NFR BLD AUTO: 0 % (ref 0–2)
LDLC SERPL CALC-MCNC: 92 MG/DL (ref 0–100)
LYMPHOCYTES # BLD AUTO: 2.2 THOUSANDS/ΜL (ref 0.6–4.47)
LYMPHOCYTES NFR BLD AUTO: 32 % (ref 14–44)
MCH RBC QN AUTO: 32.7 PG (ref 26.8–34.3)
MCHC RBC AUTO-ENTMCNC: 32.6 G/DL (ref 31.4–37.4)
MCV RBC AUTO: 100 FL (ref 82–98)
MONOCYTES # BLD AUTO: 0.62 THOUSAND/ΜL (ref 0.17–1.22)
MONOCYTES NFR BLD AUTO: 9 % (ref 4–12)
NEUTROPHILS # BLD AUTO: 4 THOUSANDS/ΜL (ref 1.85–7.62)
NEUTS SEG NFR BLD AUTO: 58 % (ref 43–75)
NONHDLC SERPL-MCNC: 103 MG/DL
NRBC BLD AUTO-RTO: 0 /100 WBCS
PLATELET # BLD AUTO: 285 THOUSANDS/UL (ref 149–390)
PMV BLD AUTO: 10.1 FL (ref 8.9–12.7)
POTASSIUM SERPL-SCNC: 4.6 MMOL/L (ref 3.5–5.3)
PROT SERPL-MCNC: 7.8 G/DL (ref 6.4–8.2)
RBC # BLD AUTO: 4.31 MILLION/UL (ref 3.81–5.12)
SODIUM SERPL-SCNC: 142 MMOL/L (ref 136–145)
TRIGL SERPL-MCNC: 53 MG/DL
TSH SERPL DL<=0.05 MIU/L-ACNC: 0.51 UIU/ML (ref 0.36–3.74)
WBC # BLD AUTO: 6.91 THOUSAND/UL (ref 4.31–10.16)

## 2019-05-24 PROCEDURE — 84443 ASSAY THYROID STIM HORMONE: CPT

## 2019-05-24 PROCEDURE — 36415 COLL VENOUS BLD VENIPUNCTURE: CPT

## 2019-05-24 PROCEDURE — 76882 US LMTD JT/FCL EVL NVASC XTR: CPT

## 2019-05-24 PROCEDURE — 86140 C-REACTIVE PROTEIN: CPT

## 2019-05-24 PROCEDURE — 85025 COMPLETE CBC W/AUTO DIFF WBC: CPT

## 2019-05-24 PROCEDURE — 86803 HEPATITIS C AB TEST: CPT

## 2019-05-24 PROCEDURE — 80061 LIPID PANEL: CPT

## 2019-05-24 PROCEDURE — 80053 COMPREHEN METABOLIC PANEL: CPT

## 2019-05-25 LAB — HCV AB SER QL: NORMAL

## 2019-05-28 DIAGNOSIS — I89.0 LYMPHEDEMA OF RIGHT ARM: Primary | ICD-10-CM

## 2019-05-28 DIAGNOSIS — M79.89 SWELLING OF ARM: ICD-10-CM

## 2019-06-01 DIAGNOSIS — F43.10 POST TRAUMATIC STRESS DISORDER: ICD-10-CM

## 2019-06-01 DIAGNOSIS — F41.1 GENERALIZED ANXIETY DISORDER: ICD-10-CM

## 2019-06-03 RX ORDER — DIAZEPAM 5 MG/1
TABLET ORAL
Qty: 180 TABLET | Refills: 0 | Status: SHIPPED | OUTPATIENT
Start: 2019-06-03 | End: 2019-09-02 | Stop reason: SDUPTHER

## 2019-06-10 DIAGNOSIS — G47.09 OTHER INSOMNIA: ICD-10-CM

## 2019-06-10 RX ORDER — TEMAZEPAM 7.5 MG/1
7.5 CAPSULE ORAL
Qty: 30 CAPSULE | Refills: 2 | Status: SHIPPED | OUTPATIENT
Start: 2019-06-10 | End: 2019-10-14

## 2019-06-12 ENCOUNTER — OFFICE VISIT (OUTPATIENT)
Dept: FAMILY MEDICINE CLINIC | Facility: CLINIC | Age: 64
End: 2019-06-12
Payer: COMMERCIAL

## 2019-06-12 VITALS
HEIGHT: 66 IN | BODY MASS INDEX: 18.16 KG/M2 | OXYGEN SATURATION: 98 % | DIASTOLIC BLOOD PRESSURE: 82 MMHG | WEIGHT: 113 LBS | HEART RATE: 84 BPM | SYSTOLIC BLOOD PRESSURE: 124 MMHG

## 2019-06-12 DIAGNOSIS — M67.40 GANGLION CYST: ICD-10-CM

## 2019-06-12 DIAGNOSIS — Z00.00 ANNUAL PHYSICAL EXAM: Primary | ICD-10-CM

## 2019-06-12 DIAGNOSIS — M25.561 ACUTE PAIN OF RIGHT KNEE: ICD-10-CM

## 2019-06-12 DIAGNOSIS — G47.09 OTHER INSOMNIA: ICD-10-CM

## 2019-06-12 PROCEDURE — 99396 PREV VISIT EST AGE 40-64: CPT | Performed by: NURSE PRACTITIONER

## 2019-06-14 ENCOUNTER — APPOINTMENT (OUTPATIENT)
Dept: RADIOLOGY | Facility: CLINIC | Age: 64
End: 2019-06-14
Payer: COMMERCIAL

## 2019-06-14 ENCOUNTER — OFFICE VISIT (OUTPATIENT)
Dept: OBGYN CLINIC | Facility: CLINIC | Age: 64
End: 2019-06-14
Payer: COMMERCIAL

## 2019-06-14 VITALS
BODY MASS INDEX: 19.12 KG/M2 | DIASTOLIC BLOOD PRESSURE: 74 MMHG | SYSTOLIC BLOOD PRESSURE: 122 MMHG | HEIGHT: 64 IN | WEIGHT: 112 LBS | HEART RATE: 88 BPM

## 2019-06-14 DIAGNOSIS — M67.40 GANGLION CYST: ICD-10-CM

## 2019-06-14 DIAGNOSIS — M25.561 ACUTE PAIN OF RIGHT KNEE: ICD-10-CM

## 2019-06-14 PROCEDURE — 73564 X-RAY EXAM KNEE 4 OR MORE: CPT

## 2019-06-14 PROCEDURE — 20612 ASPIRATE/INJ GANGLION CYST: CPT | Performed by: ORTHOPAEDIC SURGERY

## 2019-06-14 PROCEDURE — 99203 OFFICE O/P NEW LOW 30 MIN: CPT | Performed by: ORTHOPAEDIC SURGERY

## 2019-06-24 ENCOUNTER — OFFICE VISIT (OUTPATIENT)
Dept: OBGYN CLINIC | Facility: CLINIC | Age: 64
End: 2019-06-24
Payer: COMMERCIAL

## 2019-06-24 VITALS
HEART RATE: 89 BPM | SYSTOLIC BLOOD PRESSURE: 135 MMHG | BODY MASS INDEX: 19.12 KG/M2 | WEIGHT: 112 LBS | DIASTOLIC BLOOD PRESSURE: 84 MMHG | HEIGHT: 64 IN

## 2019-06-24 DIAGNOSIS — M67.40 GANGLION CYST: Primary | ICD-10-CM

## 2019-06-24 DIAGNOSIS — M25.561 ACUTE PAIN OF RIGHT KNEE: ICD-10-CM

## 2019-06-24 PROCEDURE — 99213 OFFICE O/P EST LOW 20 MIN: CPT | Performed by: ORTHOPAEDIC SURGERY

## 2019-07-24 DIAGNOSIS — E03.9 ACQUIRED HYPOTHYROIDISM: ICD-10-CM

## 2019-07-24 RX ORDER — LIOTHYRONINE SODIUM 5 UG/1
5 TABLET ORAL DAILY
Qty: 90 TABLET | Refills: 2 | Status: SHIPPED | OUTPATIENT
Start: 2019-07-24 | End: 2020-06-08

## 2019-09-02 DIAGNOSIS — F43.10 POST TRAUMATIC STRESS DISORDER: ICD-10-CM

## 2019-09-02 DIAGNOSIS — F41.1 GENERALIZED ANXIETY DISORDER: ICD-10-CM

## 2019-09-03 RX ORDER — DIAZEPAM 5 MG/1
TABLET ORAL
Qty: 180 TABLET | Refills: 0 | Status: SHIPPED | OUTPATIENT
Start: 2019-09-03 | End: 2019-12-10 | Stop reason: SDUPTHER

## 2019-10-14 ENCOUNTER — OFFICE VISIT (OUTPATIENT)
Dept: FAMILY MEDICINE CLINIC | Facility: CLINIC | Age: 64
End: 2019-10-14
Payer: COMMERCIAL

## 2019-10-14 VITALS
DIASTOLIC BLOOD PRESSURE: 80 MMHG | HEART RATE: 89 BPM | BODY MASS INDEX: 19.08 KG/M2 | WEIGHT: 111.77 LBS | OXYGEN SATURATION: 99 % | HEIGHT: 64 IN | SYSTOLIC BLOOD PRESSURE: 152 MMHG

## 2019-10-14 DIAGNOSIS — Z23 NEED FOR SHINGLES VACCINE: ICD-10-CM

## 2019-10-14 DIAGNOSIS — Z23 ENCOUNTER FOR VACCINATION: ICD-10-CM

## 2019-10-14 DIAGNOSIS — F41.9 ANXIETY: ICD-10-CM

## 2019-10-14 DIAGNOSIS — M67.40 GANGLION CYST: ICD-10-CM

## 2019-10-14 DIAGNOSIS — Z12.11 COLON CANCER SCREENING: ICD-10-CM

## 2019-10-14 DIAGNOSIS — Z00.00 WELLNESS EXAMINATION: ICD-10-CM

## 2019-10-14 DIAGNOSIS — G47.09 OTHER INSOMNIA: Primary | ICD-10-CM

## 2019-10-14 PROCEDURE — 90471 IMMUNIZATION ADMIN: CPT

## 2019-10-14 PROCEDURE — 99213 OFFICE O/P EST LOW 20 MIN: CPT | Performed by: NURSE PRACTITIONER

## 2019-10-14 PROCEDURE — 90750 HZV VACC RECOMBINANT IM: CPT

## 2019-10-14 NOTE — PROGRESS NOTES
Assessment/Plan:       Diagnoses and all orders for this visit:    Other insomnia    Anxiety    Wellness examination  -     Lipid panel; Future    Colon cancer screening  -     Occult Blood, Fecal Immunochemical; Future    Encounter for vaccination  -     Zoster Vaccine Recombinant IM    Need for shingles vaccine  -     Zoster Vaccine Recombinant IM    Ganglion cyst      First shingrix injection given today  Fit Kit provided  Continue to wean off of Valium as indicated with taking half a tablet  Will repeat lipid panel to reassess HDL level  The lipid panel drawn in 05/2019 is the first I can find record of in her chart and Care Everywhere, while it is not good to have it too high, will re-evaluate repeat level first  Systolic BP slightly elevated today, will monitor  Follow-up with ortho as needed for ganglion cyst of the right knee  Subjective:      Patient ID: Latasha Pedro is a 60 y o  female  Here today for a follow-up  She is with a hx of ongoing insomnia, was taking restoril for several years but began weaning herself off the medication earlier this year  Reports she is completely finished with the medication  Has been taking CBD gummies with melatonin in them to help with sleep  Reports she lasts until 0400 hours then wakes up for a bit  Hx of anxiety also for which she was taking Valium for, has also been weaning down slowly on this  Currently is planning to only take a half of a 5 mg tablet for her scheduled dosage  Last lipid panel showed HDL of 126 with her other levels normal, except for the total cholesterol which was elevated from the HDL - no recent lipid panels to compare  She eats a high protein diet and follows an exercise regimen  HX of a ganglion cyst on her right knee, followed by ortho, states at present she drained it again herself at home and it is barely visible and not bothering her  Systolic BP slightly elevated today, denies any sx          The following portions of the patient's history were reviewed and updated as appropriate: allergies, current medications, past family history, past medical history, past social history, past surgical history and problem list     Review of Systems   Constitutional: Negative  Respiratory: Negative  Cardiovascular: Negative  Musculoskeletal: Negative  Neurological: Negative  All other systems reviewed and are negative  Objective:      /80 (BP Location: Left arm, Patient Position: Sitting, Cuff Size: Standard)   Pulse 89   Ht 5' 4" (1 626 m)   Wt 50 7 kg (111 lb 12 4 oz)   SpO2 99%   BMI 19 19 kg/m²          Physical Exam   Constitutional: She is oriented to person, place, and time  She appears well-developed and well-nourished  No distress  HENT:   Head: Normocephalic and atraumatic  Eyes: Conjunctivae and EOM are normal  Right eye exhibits no discharge  Left eye exhibits no discharge  Neck: Normal range of motion  Neck supple  Cardiovascular: Normal rate, regular rhythm and normal heart sounds  No murmur heard  Pulmonary/Chest: Effort normal and breath sounds normal  No respiratory distress  She has no wheezes  Neurological: She is alert and oriented to person, place, and time  She exhibits normal muscle tone  Coordination normal    Skin: She is not diaphoretic  Psychiatric: She has a normal mood and affect  Her behavior is normal  Judgment and thought content normal    Vitals reviewed

## 2019-10-17 ENCOUNTER — APPOINTMENT (OUTPATIENT)
Dept: LAB | Facility: HOSPITAL | Age: 64
End: 2019-10-17
Payer: COMMERCIAL

## 2019-10-17 DIAGNOSIS — Z12.11 COLON CANCER SCREENING: ICD-10-CM

## 2019-10-17 LAB — HEMOCCULT STL QL IA: NEGATIVE

## 2019-10-17 PROCEDURE — G0328 FECAL BLOOD SCRN IMMUNOASSAY: HCPCS

## 2019-10-22 ENCOUNTER — OFFICE VISIT (OUTPATIENT)
Dept: FAMILY MEDICINE CLINIC | Facility: CLINIC | Age: 64
End: 2019-10-22
Payer: COMMERCIAL

## 2019-10-22 VITALS
OXYGEN SATURATION: 98 % | BODY MASS INDEX: 19.27 KG/M2 | WEIGHT: 112.88 LBS | DIASTOLIC BLOOD PRESSURE: 80 MMHG | HEIGHT: 64 IN | HEART RATE: 71 BPM | SYSTOLIC BLOOD PRESSURE: 150 MMHG

## 2019-10-22 DIAGNOSIS — L03.211 FACIAL CELLULITIS: ICD-10-CM

## 2019-10-22 DIAGNOSIS — S00.261A INSECT BITE OF RIGHT EYELID, INITIAL ENCOUNTER: Primary | ICD-10-CM
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
## 2019-10-22 PROCEDURE — 3008F BODY MASS INDEX DOCD: CPT | Performed by: NURSE PRACTITIONER

## 2019-10-22 PROCEDURE — 99213 OFFICE O/P EST LOW 20 MIN: CPT | Performed by: NURSE PRACTITIONER

## 2019-10-22 RX ORDER — PREDNISONE 20 MG/1
20 TABLET ORAL DAILY
Qty: 4 TABLET | Refills: 0 | Status: SHIPPED | OUTPATIENT
Start: 2019-10-22 | End: 2019-10-26

## 2019-10-22 RX ORDER — CEPHALEXIN 500 MG/1
500 CAPSULE ORAL EVERY 8 HOURS SCHEDULED
Qty: 21 CAPSULE | Refills: 0 | Status: SHIPPED | OUTPATIENT
Start: 2019-10-22 | End: 2019-10-29

## 2019-10-22 NOTE — PROGRESS NOTES
Assessment/Plan:       Diagnoses and all orders for this visit:    Insect bite of right eyelid, initial encounter  -     predniSONE 20 mg tablet; Take 1 tablet (20 mg total) by mouth daily for 4 days  -     cephalexin (KEFLEX) 500 mg capsule; Take 1 capsule (500 mg total) by mouth every 8 (eight) hours for 7 days    Facial cellulitis  -     predniSONE 20 mg tablet; Take 1 tablet (20 mg total) by mouth daily for 4 days  -     cephalexin (KEFLEX) 500 mg capsule; Take 1 capsule (500 mg total) by mouth every 8 (eight) hours for 7 days      Suspect beginning stages of cellulitis from insect bite  Will treat with abx and steroids  Call tomorrow if no improvement  Subjective:      Patient ID: Blanca Marmolejo is a 60 y o  female  Here today for a same day appointment related to facial swelling  Reports onset of facial swelling beginning last night after being stung by an insect while mowing the lawn  Reports area surrounding her right eye became swollen, warm, and painful  Has tried ice to her face which only made it worse  Reports it is worse this AM   Denies any changes in her vision  The following portions of the patient's history were reviewed and updated as appropriate: allergies, current medications, past family history, past medical history, past social history, past surgical history and problem list     Review of Systems   Constitutional: Negative  Eyes: Negative for pain, redness and visual disturbance  Skin:        Please see HPI  All other systems reviewed and are negative  Objective:      /80 (BP Location: Left arm, Patient Position: Sitting, Cuff Size: Standard)   Pulse 71   Ht 5' 4" (1 626 m)   Wt 51 2 kg (112 lb 14 oz)   SpO2 98%   BMI 19 38 kg/m²          Physical Exam   HENT:   Head:       Eyes: Conjunctivae and EOM are normal  Right eye exhibits no discharge  Left eye exhibits no discharge  Pulmonary/Chest: Effort normal    Neurological: She is alert

## 2019-12-10 DIAGNOSIS — N95.1 MENOPAUSAL STATE: ICD-10-CM

## 2019-12-10 DIAGNOSIS — F41.1 GENERALIZED ANXIETY DISORDER: ICD-10-CM

## 2019-12-10 DIAGNOSIS — F43.10 POST TRAUMATIC STRESS DISORDER: ICD-10-CM

## 2019-12-11 RX ORDER — DIAZEPAM 5 MG/1
TABLET ORAL
Qty: 180 TABLET | Refills: 0 | Status: SHIPPED | OUTPATIENT
Start: 2019-12-11 | End: 2020-03-13

## 2019-12-16 ENCOUNTER — CLINICAL SUPPORT (OUTPATIENT)
Dept: FAMILY MEDICINE CLINIC | Facility: CLINIC | Age: 64
End: 2019-12-16
Payer: COMMERCIAL

## 2019-12-16 VITALS
SYSTOLIC BLOOD PRESSURE: 144 MMHG | HEART RATE: 80 BPM | DIASTOLIC BLOOD PRESSURE: 82 MMHG | OXYGEN SATURATION: 97 % | HEIGHT: 64 IN | WEIGHT: 113.98 LBS | BODY MASS INDEX: 19.46 KG/M2

## 2019-12-16 DIAGNOSIS — Z23 NEED FOR VACCINATION: Primary | ICD-10-CM

## 2019-12-16 PROCEDURE — 90750 HZV VACC RECOMBINANT IM: CPT

## 2019-12-16 PROCEDURE — 90471 IMMUNIZATION ADMIN: CPT

## 2019-12-26 ENCOUNTER — OFFICE VISIT (OUTPATIENT)
Dept: FAMILY MEDICINE CLINIC | Facility: CLINIC | Age: 64
End: 2019-12-26
Payer: COMMERCIAL

## 2019-12-26 ENCOUNTER — TRANSCRIBE ORDERS (OUTPATIENT)
Dept: ADMINISTRATIVE | Facility: HOSPITAL | Age: 64
End: 2019-12-26

## 2019-12-26 ENCOUNTER — APPOINTMENT (OUTPATIENT)
Dept: LAB | Facility: CLINIC | Age: 64
End: 2019-12-26
Payer: COMMERCIAL

## 2019-12-26 VITALS
DIASTOLIC BLOOD PRESSURE: 78 MMHG | HEIGHT: 64 IN | BODY MASS INDEX: 19.68 KG/M2 | HEART RATE: 89 BPM | WEIGHT: 115.3 LBS | OXYGEN SATURATION: 98 % | SYSTOLIC BLOOD PRESSURE: 120 MMHG

## 2019-12-26 DIAGNOSIS — N95.1 MENOPAUSAL STATE: ICD-10-CM

## 2019-12-26 DIAGNOSIS — Z00.00 WELLNESS EXAMINATION: ICD-10-CM

## 2019-12-26 DIAGNOSIS — I89.0 LYMPHEDEMA OF RIGHT ARM: ICD-10-CM

## 2019-12-26 DIAGNOSIS — Z13.220 SCREENING FOR CHOLESTEROL LEVEL: ICD-10-CM

## 2019-12-26 DIAGNOSIS — Z85.3 HX OF BREAST CANCER: ICD-10-CM

## 2019-12-26 DIAGNOSIS — G47.09 OTHER INSOMNIA: ICD-10-CM

## 2019-12-26 DIAGNOSIS — L03.818 CELLULITIS OF OTHER SPECIFIED SITE: ICD-10-CM

## 2019-12-26 DIAGNOSIS — F32.A DEPRESSION, UNSPECIFIED DEPRESSION TYPE: ICD-10-CM

## 2019-12-26 DIAGNOSIS — M79.89 SWELLING OF UPPER ARM: Primary | ICD-10-CM

## 2019-12-26 LAB
ALBUMIN SERPL BCP-MCNC: 3.6 G/DL (ref 3.5–5)
ALP SERPL-CCNC: 123 U/L (ref 46–116)
ALT SERPL W P-5'-P-CCNC: 16 U/L (ref 12–78)
ANION GAP SERPL CALCULATED.3IONS-SCNC: 4 MMOL/L (ref 4–13)
AST SERPL W P-5'-P-CCNC: 16 U/L (ref 5–45)
BILIRUB SERPL-MCNC: 0.71 MG/DL (ref 0.2–1)
BUN SERPL-MCNC: 9 MG/DL (ref 5–25)
CALCIUM SERPL-MCNC: 8.9 MG/DL (ref 8.3–10.1)
CHLORIDE SERPL-SCNC: 109 MMOL/L (ref 100–108)
CHOLEST SERPL-MCNC: 214 MG/DL (ref 50–200)
CO2 SERPL-SCNC: 27 MMOL/L (ref 21–32)
CREAT SERPL-MCNC: 0.71 MG/DL (ref 0.6–1.3)
GFR SERPL CREATININE-BSD FRML MDRD: 92 ML/MIN/1.73SQ M
GLUCOSE P FAST SERPL-MCNC: 92 MG/DL (ref 65–99)
HDLC SERPL-MCNC: 114 MG/DL
LDLC SERPL CALC-MCNC: 86 MG/DL (ref 0–100)
NONHDLC SERPL-MCNC: 100 MG/DL
POTASSIUM SERPL-SCNC: 4.2 MMOL/L (ref 3.5–5.3)
PROGEST SERPL-MCNC: 56.7 NG/ML
PROT SERPL-MCNC: 7.1 G/DL (ref 6.4–8.2)
SODIUM SERPL-SCNC: 140 MMOL/L (ref 136–145)
TRIGL SERPL-MCNC: 69 MG/DL

## 2019-12-26 PROCEDURE — 80061 LIPID PANEL: CPT

## 2019-12-26 PROCEDURE — 84144 ASSAY OF PROGESTERONE: CPT

## 2019-12-26 PROCEDURE — 99214 OFFICE O/P EST MOD 30 MIN: CPT | Performed by: NURSE PRACTITIONER

## 2019-12-26 PROCEDURE — 3008F BODY MASS INDEX DOCD: CPT | Performed by: NURSE PRACTITIONER

## 2019-12-26 PROCEDURE — 36415 COLL VENOUS BLD VENIPUNCTURE: CPT

## 2019-12-26 PROCEDURE — 80053 COMPREHEN METABOLIC PANEL: CPT

## 2019-12-26 PROCEDURE — 82672 ASSAY OF ESTROGEN: CPT

## 2019-12-26 PROCEDURE — 1036F TOBACCO NON-USER: CPT | Performed by: NURSE PRACTITIONER

## 2019-12-26 RX ORDER — CLINDAMYCIN HYDROCHLORIDE 300 MG/1
300 CAPSULE ORAL 3 TIMES DAILY
Qty: 30 CAPSULE | Refills: 0 | Status: SHIPPED | OUTPATIENT
Start: 2019-12-26 | End: 2020-01-05

## 2019-12-26 NOTE — PATIENT INSTRUCTIONS
Call tomorrow afternoon if no improvement, or worsening in your right upper arm  You may receive a survey in the mail, or by e-mail, please fill it out, and let us know how we did! Please remember to sign up for your [employer] ivette to check you lab results, send us messages, and schedule appointments  If you have questions about the [employer] option, please call us! Thank you again for choosing [facility]!

## 2019-12-26 NOTE — PROGRESS NOTES
Assessment/Plan:       Diagnoses and all orders for this visit:    Swelling of upper arm  -     clindamycin (CLEOCIN) 300 MG capsule; Take 1 capsule (300 mg total) by mouth 3 (three) times a day for 10 days    Cellulitis of other specified site  -     clindamycin (CLEOCIN) 300 MG capsule; Take 1 capsule (300 mg total) by mouth 3 (three) times a day for 10 days    Wellness examination  -     Lipid panel; Future  -     Comprehensive metabolic panel; Future    Menopausal state  -     Estrogens, total; Future  -     Progesterone; Future    Lymphedema of right arm    Screening for cholesterol level  -     Lipid panel; Future    Hx of breast cancer    Other insomnia    Depression, unspecified depression type    Other orders  -     Cancel: Ambulatory referral to Gastroenterology; Future      Right arm swelling: Cellulitis from bug bite/scratch vs  Lymphedema vs  DVT  She is with a large amount of scarring from past silicone leakage from a ruptured right breast implant  US of the right upper arm completed in the more recent past due to swelling with difficulty visualizing due to scarring and retained silicone from rupture  Will have her start Clindamycin and if no improvement, or worsening in her right upper arm by tomorrow afternoon, she is to make us aware  Blood work ordered for screening and also per her request regarding hormone levels  Fit Kit completed in 10/2019 which was negative  Refused HIV screening today, states she has had it in the past for surgery pre-op's  Subjective:      Patient ID: Malvin Menezes is a 60 y o  female  Here today for a follow-up and an acute complaint  Onset of right upper arm swelling noted to start yesterday  Hx of lymphedema in the right arm due to all of the lymph nodes of her axilla removed but one with breast CA surgery and silicone implant rupture with leakage and scarring    Reports two marks on her right upper, outer arm which may be from bug bites, or a scratch - [place] that go in and out  Reports right arm is uncomfortable today but aware there could be a variety of reasons for the swelling  Still with full movement of the right arm, just difficult due to the swelling of the upper arm/bicep/tricep region  She also reports taking a natural supplement to help "remove the plaque from her body which also help resolve the "floaters" in her b/l eyes  Has stopped taking the Temazepam at bedtime - has been on a slow wean process for about a year  Still taking 5 mg diazepam as needed for anxiety  Last refill on 12/11/2019, and had not been filled for three months prior to that  Continues with Wellbutrin for Depression  Zomig used for insomnia  Hypothyroid stable with Levothyroxine  Last TSH was in 05/2019 and was within normal range  She would like screening blood work done before the end of the year - last lipid panel with elevated total cholesterol due to an elevated HDL level  The following portions of the patient's history were reviewed and updated as appropriate: allergies, current medications, past family history, past medical history, past social history, past surgical history and problem list     Review of Systems   Constitutional: Negative  Respiratory: Negative  Cardiovascular: Negative  Musculoskeletal:        Please see HPI about right arm swelling  All other systems reviewed and are negative  Objective:      /78 (BP Location: Left arm, Patient Position: Sitting, Cuff Size: Standard)   Pulse 89   Ht 5' 4" (1 626 m)   Wt 52 3 kg (115 lb 4 8 oz)   SpO2 98%   BMI 19 79 kg/m²          Physical Exam   Constitutional: She is oriented to person, place, and time  She appears well-developed and well-nourished  HENT:   Head: Normocephalic and atraumatic  Eyes: Conjunctivae and EOM are normal    Neck: Neck supple  No tracheal deviation present  No thyromegaly present     Cardiovascular: Normal rate, regular rhythm and normal heart sounds  Exam reveals no gallop and no friction rub  No murmur heard  Pulmonary/Chest: Effort normal and breath sounds normal  No stridor  No respiratory distress  She has no wheezes  She has no rales  Musculoskeletal:        Right upper arm: She exhibits swelling  Right upper arm with obvious swelling, noted two small, scabbed, circular areas approximately 0 25 cm in diameter on right outer, upper arm  Appear to be superficial, but some erythema noted surrounding area  Lymphadenopathy:        Head (right side): No tonsillar and no preauricular adenopathy present  Head (left side): No tonsillar and no preauricular adenopathy present  She has no cervical adenopathy  Neurological: She is alert and oriented to person, place, and time  Skin: Skin is warm and dry  Psychiatric: She has a normal mood and affect  Her behavior is normal  Judgment and thought content normal    Vitals reviewed

## 2019-12-29 LAB — ESTROGEN SERPL-MCNC: 130 PG/ML

## 2020-01-02 ENCOUNTER — TELEPHONE (OUTPATIENT)
Dept: FAMILY MEDICINE CLINIC | Facility: CLINIC | Age: 65
End: 2020-01-02

## 2020-01-02 DIAGNOSIS — M79.89 SWELLING OF UPPER ARM: Primary | ICD-10-CM

## 2020-01-02 RX ORDER — METHYLPREDNISOLONE 4 MG/1
TABLET ORAL
Qty: 21 EACH | Refills: 0 | Status: SHIPPED | OUTPATIENT
Start: 2020-01-02 | End: 2020-05-27

## 2020-01-02 NOTE — TELEPHONE ENCOUNTER
Still has arm swelling, it is improving, but slowly  She is asking for an order for prednisone  To be called into [facility], [place]

## 2020-01-13 DIAGNOSIS — N95.1 MENOPAUSAL STATE: ICD-10-CM

## 2020-02-05 DIAGNOSIS — E03.9 ACQUIRED HYPOTHYROIDISM: ICD-10-CM

## 2020-02-05 RX ORDER — LEVOTHYROXINE SODIUM 0.05 MG/1
TABLET ORAL
Qty: 90 TABLET | Refills: 0 | Status: SHIPPED | OUTPATIENT
Start: 2020-02-05 | End: 2020-05-06

## 2020-02-25 DIAGNOSIS — F32.A DEPRESSION: ICD-10-CM

## 2020-02-25 RX ORDER — BUPROPION HYDROCHLORIDE 100 MG/1
100 TABLET ORAL DAILY
Qty: 30 TABLET | Refills: 0 | Status: CANCELLED | OUTPATIENT
Start: 2020-02-25

## 2020-02-25 NOTE — TELEPHONE ENCOUNTER
Patient would like to know if you can order her 75MG instead of the 100Mg as she is trying to cut down on this medication

## 2020-02-26 DIAGNOSIS — F32.A DEPRESSION: ICD-10-CM

## 2020-02-26 RX ORDER — BUPROPION HYDROCHLORIDE 75 MG/1
75 TABLET ORAL DAILY
Qty: 30 TABLET | Refills: 2 | Status: SHIPPED | OUTPATIENT
Start: 2020-02-26 | End: 2020-05-08

## 2020-02-26 RX ORDER — BUPROPION HYDROCHLORIDE 75 MG/1
75 TABLET ORAL DAILY
Qty: 30 TABLET | Refills: 2 | Status: SHIPPED | OUTPATIENT
Start: 2020-02-26 | End: 2020-02-26 | Stop reason: SDUPTHER

## 2020-03-13 DIAGNOSIS — F41.1 GENERALIZED ANXIETY DISORDER: ICD-10-CM

## 2020-03-13 DIAGNOSIS — F43.10 POST TRAUMATIC STRESS DISORDER: ICD-10-CM

## 2020-03-13 RX ORDER — DIAZEPAM 5 MG/1
TABLET ORAL
Qty: 180 TABLET | Refills: 0 | Status: SHIPPED | OUTPATIENT
Start: 2020-03-13 | End: 2020-11-30

## 2020-04-30 DIAGNOSIS — N95.1 MENOPAUSAL STATE: ICD-10-CM

## 2020-05-06 DIAGNOSIS — E03.9 ACQUIRED HYPOTHYROIDISM: ICD-10-CM

## 2020-05-06 RX ORDER — LEVOTHYROXINE SODIUM 0.05 MG/1
TABLET ORAL
Qty: 90 TABLET | Refills: 0 | Status: SHIPPED | OUTPATIENT
Start: 2020-05-06 | End: 2021-02-10

## 2020-05-08 DIAGNOSIS — F32.A DEPRESSION: ICD-10-CM

## 2020-05-08 RX ORDER — BUPROPION HYDROCHLORIDE 75 MG/1
TABLET ORAL
Qty: 90 TABLET | Refills: 0 | Status: SHIPPED | OUTPATIENT
Start: 2020-05-08 | End: 2020-10-21

## 2020-05-22 ENCOUNTER — TRANSITIONAL CARE MANAGEMENT (OUTPATIENT)
Dept: FAMILY MEDICINE CLINIC | Facility: CLINIC | Age: 65
End: 2020-05-22

## 2020-05-27 ENCOUNTER — OFFICE VISIT (OUTPATIENT)
Dept: FAMILY MEDICINE CLINIC | Facility: CLINIC | Age: 65
End: 2020-05-27
Payer: COMMERCIAL

## 2020-05-27 VITALS
OXYGEN SATURATION: 96 % | WEIGHT: 100.2 LBS | DIASTOLIC BLOOD PRESSURE: 84 MMHG | BODY MASS INDEX: 17.11 KG/M2 | HEIGHT: 64 IN | HEART RATE: 101 BPM | TEMPERATURE: 97.8 F | SYSTOLIC BLOOD PRESSURE: 130 MMHG
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
## 2020-05-27 DIAGNOSIS — S12.500D CLOSED DISPLACED FRACTURE OF SIXTH CERVICAL VERTEBRA WITH ROUTINE HEALING, UNSPECIFIED FRACTURE MORPHOLOGY, SUBSEQUENT ENCOUNTER: ICD-10-CM

## 2020-05-27 DIAGNOSIS — S62.102S CLOSED FRACTURE OF LEFT WRIST, SEQUELA: ICD-10-CM

## 2020-05-27 DIAGNOSIS — S01.01XS LACERATION OF SCALP WITHOUT FOREIGN BODY, SEQUELA: ICD-10-CM

## 2020-05-27 DIAGNOSIS — S02.2XXG CLOSED FRACTURE OF NASAL BONE WITH DELAYED HEALING, SUBSEQUENT ENCOUNTER: ICD-10-CM

## 2020-05-27 PROCEDURE — 99496 TRANSJ CARE MGMT HIGH F2F 7D: CPT | Performed by: NURSE PRACTITIONER

## 2020-06-08 DIAGNOSIS — E03.9 ACQUIRED HYPOTHYROIDISM: ICD-10-CM

## 2020-06-08 RX ORDER — LIOTHYRONINE SODIUM 5 UG/1
5 TABLET ORAL DAILY
Qty: 90 TABLET | Refills: 2 | Status: SHIPPED | OUTPATIENT
Start: 2020-06-08 | End: 2021-08-16

## 2020-07-19 DIAGNOSIS — N95.1 MENOPAUSAL STATE: ICD-10-CM

## 2020-08-13 ENCOUNTER — TELEPHONE (OUTPATIENT)
Dept: FAMILY MEDICINE CLINIC | Facility: CLINIC | Age: 65
End: 2020-08-13

## 2020-08-13 ENCOUNTER — OFFICE VISIT (OUTPATIENT)
Dept: FAMILY MEDICINE CLINIC | Facility: CLINIC | Age: 65
End: 2020-08-13

## 2020-08-13 VITALS
WEIGHT: 101 LBS | HEART RATE: 93 BPM | TEMPERATURE: 98 F | HEIGHT: 64 IN | SYSTOLIC BLOOD PRESSURE: 120 MMHG | BODY MASS INDEX: 17.24 KG/M2 | OXYGEN SATURATION: 98 % | DIASTOLIC BLOOD PRESSURE: 82 MMHG

## 2020-08-13 DIAGNOSIS — M79.602 LEFT ARM PAIN: ICD-10-CM

## 2020-08-13 DIAGNOSIS — R20.0 ARM NUMBNESS: Primary | ICD-10-CM

## 2020-08-13 DIAGNOSIS — M43.6 NECK STIFFNESS: ICD-10-CM

## 2020-08-13 DIAGNOSIS — S62.102S CLOSED FRACTURE OF LEFT WRIST, SEQUELA: ICD-10-CM

## 2020-08-13 DIAGNOSIS — G56.90 NEUROPATHY, ARM, UNSPECIFIED LATERALITY: ICD-10-CM

## 2020-08-13 DIAGNOSIS — N39.41 URGE INCONTINENCE: ICD-10-CM

## 2020-08-13 PROCEDURE — 3008F BODY MASS INDEX DOCD: CPT | Performed by: NURSE PRACTITIONER

## 2020-08-13 PROCEDURE — 1036F TOBACCO NON-USER: CPT | Performed by: NURSE PRACTITIONER

## 2020-08-13 PROCEDURE — 99213 OFFICE O/P EST LOW 20 MIN: CPT | Performed by: NURSE PRACTITIONER

## 2020-08-13 RX ORDER — GABAPENTIN 400 MG/1
400 CAPSULE ORAL 3 TIMES DAILY
Qty: 90 CAPSULE | Refills: 1 | Status: SHIPPED | OUTPATIENT
Start: 2020-08-13 | End: 2020-10-20 | Stop reason: SDUPTHER

## 2020-08-13 RX ORDER — GABAPENTIN 400 MG/1
400 CAPSULE ORAL 3 TIMES DAILY
COMMUNITY
End: 2020-08-13 | Stop reason: SDUPTHER

## 2020-08-13 NOTE — PROGRESS NOTES
Assessment/Plan:         Diagnoses and all orders for this visit:    Arm numbness  -     gabapentin (NEURONTIN) 400 mg capsule; Take 1 capsule (400 mg total) by mouth 3 (three) times a day    Urge incontinence    Left arm pain    Closed fracture of left wrist, sequela    Neck stiffness  -     gabapentin (NEURONTIN) 400 mg capsule; Take 1 capsule (400 mg total) by mouth 3 (three) times a day    Neuropathy, arm, unspecified laterality  -     gabapentin (NEURONTIN) 400 mg capsule; Take 1 capsule (400 mg total) by mouth 3 (three) times a day    Other orders  -     Discontinue: gabapentin (NEURONTIN) 400 mg capsule; Take 400 mg by mouth 3 (three) times a day      Will write letter to insurance regarding no history of ETOH or benzodiazapine abuse/dependence  Will await reinstatement of insurance and refer to PT for cervical spine/neck issues, b/l arm numbness, and left arm pain from fracture  Refill provided for gabapentin  Subjective:      Patient ID: Joni Morelos is a 63 y o  female  Here today for a follow-up regarding her recent fall resulting in a cervical spine injury and left arm fracture  She continues with b/l upper extremity neuropathy and numbness to her elbows to her hands  Notes also of stiffness/pain to her neck  Hx of increased pain to her left forearm also due to the fracture  She is taking gabapentin for the pain with effect  Recently had her insurance pulled due to a diagnosis from the hospital admission stating of ETOH dependence of which she has no history  She is currently on diazepam but has taken it as directed and is in the process of weaning off of it  She has been unable to complete physical therapy due to the loss of insurance         The following portions of the patient's history were reviewed and updated as appropriate: allergies, current medications, past family history, past medical history, past social history, past surgical history and problem list     Review of Systems Musculoskeletal:        Please see HPI  Neurological:        Please see HPI  Objective:      /82 (BP Location: Right arm, Patient Position: Sitting, Cuff Size: Standard)   Pulse 93   Temp 98 °F (36 7 °C)   Ht 5' 4" (1 626 m)   Wt 45 8 kg (101 lb)   SpO2 98%   BMI 17 34 kg/m²          Physical Exam  Musculoskeletal:      Cervical back: She exhibits decreased range of motion, pain and spasm  Right forearm: She exhibits no swelling and no edema  Left forearm: She exhibits no swelling, no edema and no deformity  Comments: Limited neck movement when turning to the left  Neurological:      General: No focal deficit present  Mental Status: She is oriented to person, place, and time  Mental status is at baseline  Psychiatric:         Mood and Affect: Mood normal          Behavior: Behavior normal          Thought Content:  Thought content normal          Judgment: Judgment normal

## 2020-08-13 NOTE — TELEPHONE ENCOUNTER
Can you please do me a favor - I have a signed copy of [employer] letter on my desk - I sent one last night to the insurance company through [employer] but I also want a hard copy faxed with my signature  Will someone please fax it for me? I have the fax number with the letter  Thanks

## 2020-08-13 NOTE — LETTER
August 13, 2020     Patient: Luis Enrique Joshua   YOB: 1955   Date of Visit: 8/13/2020       To Whom it May Concern:    Luis Enrique Joshua is under my professional care, and has been since 04/11/2018  She was seen in my office on 8/13/2020  She is with no history of ETOH (Alcohol) dependence or abuse, nor has she shown any signs of it at any visit  She is prescribed Diazepam, a benzodiazapine by me and is taking it as directed  She is currently in the process of weaning off the Diazepam, and had begun that process prior to her recent fall and hospital stay  If you have any questions or concerns, please don't hesitate to call           Sincerely,          Matt Rodríguez

## 2020-09-03 ENCOUNTER — TELEPHONE (OUTPATIENT)
Dept: FAMILY MEDICINE CLINIC | Facility: CLINIC | Age: 65
End: 2020-09-03

## 2020-10-19 DIAGNOSIS — N95.1 MENOPAUSAL STATE: ICD-10-CM

## 2020-10-20 DIAGNOSIS — R20.0 ARM NUMBNESS: ICD-10-CM

## 2020-10-20 DIAGNOSIS — G56.90 NEUROPATHY, ARM, UNSPECIFIED LATERALITY: ICD-10-CM

## 2020-10-20 DIAGNOSIS — M43.6 NECK STIFFNESS: ICD-10-CM

## 2020-10-20 DIAGNOSIS — F32.A DEPRESSION: ICD-10-CM

## 2020-10-21 RX ORDER — GABAPENTIN 400 MG/1
400 CAPSULE ORAL 3 TIMES DAILY
Qty: 90 CAPSULE | Refills: 1 | Status: SHIPPED | OUTPATIENT
Start: 2020-10-21 | End: 2020-12-21

## 2020-10-21 RX ORDER — GABAPENTIN 400 MG/1
CAPSULE ORAL
Qty: 90 CAPSULE | Refills: 0 | OUTPATIENT
Start: 2020-10-21

## 2020-10-21 RX ORDER — BUPROPION HYDROCHLORIDE 75 MG/1
TABLET ORAL
Qty: 90 TABLET | Refills: 0 | Status: SHIPPED | OUTPATIENT
Start: 2020-10-21 | End: 2021-01-18

## 2020-10-27 ENCOUNTER — IMMUNIZATIONS (OUTPATIENT)
Dept: FAMILY MEDICINE CLINIC | Facility: CLINIC | Age: 65
End: 2020-10-27

## 2020-10-27 VITALS
BODY MASS INDEX: 17.96 KG/M2 | WEIGHT: 105.2 LBS | DIASTOLIC BLOOD PRESSURE: 68 MMHG | SYSTOLIC BLOOD PRESSURE: 110 MMHG | HEIGHT: 64 IN

## 2020-10-27 DIAGNOSIS — Z23 NEEDS FLU SHOT: Primary | ICD-10-CM

## 2020-10-27 PROCEDURE — 90471 IMMUNIZATION ADMIN: CPT

## 2020-10-27 PROCEDURE — 90682 RIV4 VACC RECOMBINANT DNA IM: CPT

## 2020-11-29 DIAGNOSIS — F41.1 GENERALIZED ANXIETY DISORDER: ICD-10-CM

## 2020-11-29 DIAGNOSIS — F43.10 POST TRAUMATIC STRESS DISORDER: ICD-10-CM

## 2020-11-30 RX ORDER — DIAZEPAM 5 MG/1
TABLET ORAL
Qty: 90 TABLET | Refills: 0 | Status: SHIPPED | OUTPATIENT
Start: 2020-11-30 | End: 2021-02-10

## 2020-12-01 ENCOUNTER — TELEMEDICINE (OUTPATIENT)
Dept: FAMILY MEDICINE CLINIC | Facility: CLINIC | Age: 65
End: 2020-12-01
Payer: OTHER GOVERNMENT

## 2020-12-01 VITALS
TEMPERATURE: 99.4 F | DIASTOLIC BLOOD PRESSURE: 91 MMHG | OXYGEN SATURATION: 97 % | HEIGHT: 64 IN | BODY MASS INDEX: 17.24 KG/M2 | WEIGHT: 101 LBS | SYSTOLIC BLOOD PRESSURE: 160 MMHG

## 2020-12-01 DIAGNOSIS — Z20.822 SUSPECTED COVID-19 VIRUS INFECTION: Primary | ICD-10-CM

## 2020-12-01 PROCEDURE — U0003 INFECTIOUS AGENT DETECTION BY NUCLEIC ACID (DNA OR RNA); SEVERE ACUTE RESPIRATORY SYNDROME CORONAVIRUS 2 (SARS-COV-2) (CORONAVIRUS DISEASE [COVID-19]), AMPLIFIED PROBE TECHNIQUE, MAKING USE OF HIGH THROUGHPUT TECHNOLOGIES AS DESCRIBED BY CMS-2020-01-R: HCPCS | Performed by: INTERNAL MEDICINE

## 2020-12-01 PROCEDURE — 99213 OFFICE O/P EST LOW 20 MIN: CPT | Performed by: INTERNAL MEDICINE

## 2020-12-03 ENCOUNTER — TELEMEDICINE (OUTPATIENT)
Dept: FAMILY MEDICINE CLINIC | Facility: CLINIC | Age: 65
End: 2020-12-03
Payer: OTHER GOVERNMENT

## 2020-12-03 DIAGNOSIS — R53.83 OTHER FATIGUE: ICD-10-CM

## 2020-12-03 DIAGNOSIS — R05.9 COUGH: ICD-10-CM

## 2020-12-03 DIAGNOSIS — U07.1 COVID-19 VIRUS DETECTED: Primary | ICD-10-CM

## 2020-12-03 DIAGNOSIS — R50.9 FEVER, UNSPECIFIED FEVER CAUSE: ICD-10-CM

## 2020-12-03 DIAGNOSIS — R07.0 THROAT PAIN: ICD-10-CM

## 2020-12-03 LAB — SARS-COV-2 RNA SPEC QL NAA+PROBE: DETECTED

## 2020-12-03 PROCEDURE — 99212 OFFICE O/P EST SF 10 MIN: CPT | Performed by: NURSE PRACTITIONER

## 2020-12-07 ENCOUNTER — TELEMEDICINE (OUTPATIENT)
Dept: FAMILY MEDICINE CLINIC | Facility: CLINIC | Age: 65
End: 2020-12-07
Payer: OTHER GOVERNMENT

## 2020-12-07 VITALS — HEIGHT: 64 IN | WEIGHT: 101 LBS | BODY MASS INDEX: 17.24 KG/M2

## 2020-12-07 DIAGNOSIS — R05.9 COUGH: ICD-10-CM

## 2020-12-07 DIAGNOSIS — R43.0 LOSS OF SMELL: ICD-10-CM

## 2020-12-07 DIAGNOSIS — R53.83 OTHER FATIGUE: Primary | ICD-10-CM

## 2020-12-07 DIAGNOSIS — R43.2 LOSS OF TASTE: ICD-10-CM

## 2020-12-07 PROCEDURE — 99212 OFFICE O/P EST SF 10 MIN: CPT | Performed by: NURSE PRACTITIONER

## 2020-12-09 ENCOUNTER — TELEMEDICINE (OUTPATIENT)
Dept: FAMILY MEDICINE CLINIC | Facility: CLINIC | Age: 65
End: 2020-12-09
Payer: OTHER GOVERNMENT

## 2020-12-09 VITALS — HEIGHT: 64 IN | WEIGHT: 101 LBS | BODY MASS INDEX: 17.24 KG/M2

## 2020-12-09 DIAGNOSIS — R09.81 NASAL CONGESTION: ICD-10-CM

## 2020-12-09 DIAGNOSIS — U07.1 COVID-19 VIRUS DETECTED: Primary | ICD-10-CM

## 2020-12-09 DIAGNOSIS — R43.2 LOSS OF TASTE: ICD-10-CM

## 2020-12-09 DIAGNOSIS — R53.83 OTHER FATIGUE: ICD-10-CM

## 2020-12-09 PROCEDURE — 99212 OFFICE O/P EST SF 10 MIN: CPT | Performed by: NURSE PRACTITIONER

## 2020-12-14 ENCOUNTER — TELEMEDICINE (OUTPATIENT)
Dept: FAMILY MEDICINE CLINIC | Facility: CLINIC | Age: 65
End: 2020-12-14
Payer: OTHER GOVERNMENT

## 2020-12-14 DIAGNOSIS — U07.1 COVID-19 VIRUS DETECTED: Primary | ICD-10-CM

## 2020-12-14 PROCEDURE — 99212 OFFICE O/P EST SF 10 MIN: CPT | Performed by: NURSE PRACTITIONER

## 2020-12-20 DIAGNOSIS — G56.90 NEUROPATHY, ARM, UNSPECIFIED LATERALITY: ICD-10-CM

## 2020-12-20 DIAGNOSIS — M43.6 NECK STIFFNESS: ICD-10-CM

## 2020-12-20 DIAGNOSIS — R20.0 ARM NUMBNESS: ICD-10-CM

## 2020-12-21 RX ORDER — GABAPENTIN 400 MG/1
CAPSULE ORAL
Qty: 90 CAPSULE | Refills: 0 | Status: SHIPPED | OUTPATIENT
Start: 2020-12-21 | End: 2021-01-18

## 2021-01-09 DIAGNOSIS — N95.1 MENOPAUSAL STATE: ICD-10-CM

## 2021-01-16 DIAGNOSIS — F32.A DEPRESSION: ICD-10-CM

## 2021-01-17 DIAGNOSIS — M43.6 NECK STIFFNESS: ICD-10-CM

## 2021-01-17 DIAGNOSIS — R20.0 ARM NUMBNESS: ICD-10-CM

## 2021-01-17 DIAGNOSIS — G56.90 NEUROPATHY, ARM, UNSPECIFIED LATERALITY: ICD-10-CM

## 2021-01-18 RX ORDER — BUPROPION HYDROCHLORIDE 75 MG/1
TABLET ORAL
Qty: 90 TABLET | Refills: 0 | Status: SHIPPED | OUTPATIENT
Start: 2021-01-18 | End: 2021-04-14

## 2021-01-18 RX ORDER — GABAPENTIN 400 MG/1
CAPSULE ORAL
Qty: 90 CAPSULE | Refills: 0 | Status: SHIPPED | OUTPATIENT
Start: 2021-01-18 | End: 2021-02-17

## 2021-01-19 ENCOUNTER — OFFICE VISIT (OUTPATIENT)
Dept: FAMILY MEDICINE CLINIC | Facility: CLINIC | Age: 66
End: 2021-01-19
Payer: COMMERCIAL

## 2021-01-19 VITALS
HEART RATE: 84 BPM | TEMPERATURE: 97.3 F | OXYGEN SATURATION: 98 % | WEIGHT: 100.4 LBS | DIASTOLIC BLOOD PRESSURE: 68 MMHG | HEIGHT: 64 IN | BODY MASS INDEX: 17.14 KG/M2 | SYSTOLIC BLOOD PRESSURE: 124 MMHG

## 2021-01-19 DIAGNOSIS — Z00.00 ANNUAL PHYSICAL EXAM: Primary | ICD-10-CM

## 2021-01-19 DIAGNOSIS — N39.41 URGE INCONTINENCE: ICD-10-CM

## 2021-01-19 DIAGNOSIS — M50.023 CERVICAL DISC DISORDER AT C6-C7 LEVEL WITH MYELOPATHY: ICD-10-CM

## 2021-01-19 DIAGNOSIS — G56.90 NEUROPATHY, ARM, UNSPECIFIED LATERALITY: ICD-10-CM

## 2021-01-19 DIAGNOSIS — R35.0 URINARY FREQUENCY: ICD-10-CM

## 2021-01-19 DIAGNOSIS — Z13.220 SCREENING FOR CHOLESTEROL LEVEL: ICD-10-CM

## 2021-01-19 DIAGNOSIS — Z01.419 WELL WOMAN EXAM WITH ROUTINE GYNECOLOGICAL EXAM: ICD-10-CM

## 2021-01-19 DIAGNOSIS — Z12.12 SCREENING FOR MALIGNANT NEOPLASM OF THE RECTUM: ICD-10-CM

## 2021-01-19 DIAGNOSIS — M48.02 CERVICAL STENOSIS OF SPINE: ICD-10-CM

## 2021-01-19 DIAGNOSIS — E03.9 ACQUIRED HYPOTHYROIDISM: ICD-10-CM

## 2021-01-19 DIAGNOSIS — Z12.11 SPECIAL SCREENING FOR MALIGNANT NEOPLASMS, COLON: ICD-10-CM

## 2021-01-19 LAB
BACTERIA UR QL AUTO: ABNORMAL /HPF
BILIRUB UR QL STRIP: NEGATIVE
CLARITY UR: ABNORMAL
COLOR UR: YELLOW
GLUCOSE UR STRIP-MCNC: NEGATIVE MG/DL
HGB UR QL STRIP.AUTO: ABNORMAL
KETONES UR STRIP-MCNC: NEGATIVE MG/DL
LEUKOCYTE ESTERASE UR QL STRIP: ABNORMAL
MUCOUS THREADS UR QL AUTO: ABNORMAL
NITRITE UR QL STRIP: NEGATIVE
NON-SQ EPI CELLS URNS QL MICRO: ABNORMAL /HPF
PH UR STRIP.AUTO: 6.5 [PH]
PROT UR STRIP-MCNC: NEGATIVE MG/DL
RBC #/AREA URNS AUTO: ABNORMAL /HPF
SL AMB  POCT GLUCOSE, UA: ABNORMAL
SL AMB LEUKOCYTE ESTERASE,UA: ABNORMAL
SL AMB POCT BILIRUBIN,UA: ABNORMAL
SL AMB POCT BLOOD,UA: ABNORMAL
SL AMB POCT CLARITY,UA: ABNORMAL
SL AMB POCT COLOR,UA: YELLOW
SL AMB POCT KETONES,UA: ABNORMAL
SL AMB POCT NITRITE,UA: ABNORMAL
SL AMB POCT PH,UA: 5
SL AMB POCT SPECIFIC GRAVITY,UA: 1
SL AMB POCT URINE PROTEIN: ABNORMAL
SL AMB POCT UROBILINOGEN: ABNORMAL
SP GR UR STRIP.AUTO: 1.01 (ref 1–1.03)
UROBILINOGEN UR QL STRIP.AUTO: 0.2 E.U./DL
WBC #/AREA URNS AUTO: ABNORMAL /HPF

## 2021-01-19 PROCEDURE — 81002 URINALYSIS NONAUTO W/O SCOPE: CPT | Performed by: NURSE PRACTITIONER

## 2021-01-19 PROCEDURE — 99396 PREV VISIT EST AGE 40-64: CPT | Performed by: NURSE PRACTITIONER

## 2021-01-19 PROCEDURE — 81001 URINALYSIS AUTO W/SCOPE: CPT | Performed by: NURSE PRACTITIONER

## 2021-01-19 PROCEDURE — 87186 SC STD MICRODIL/AGAR DIL: CPT | Performed by: NURSE PRACTITIONER

## 2021-01-19 PROCEDURE — 1036F TOBACCO NON-USER: CPT | Performed by: NURSE PRACTITIONER

## 2021-01-19 PROCEDURE — 87086 URINE CULTURE/COLONY COUNT: CPT | Performed by: NURSE PRACTITIONER

## 2021-01-19 PROCEDURE — 3008F BODY MASS INDEX DOCD: CPT | Performed by: NURSE PRACTITIONER

## 2021-01-19 RX ORDER — NITROFURANTOIN 25; 75 MG/1; MG/1
100 CAPSULE ORAL 2 TIMES DAILY
Qty: 10 CAPSULE | Refills: 0 | Status: SHIPPED | OUTPATIENT
Start: 2021-01-19 | End: 2021-01-24

## 2021-01-19 NOTE — PROGRESS NOTES
ADULT ANNUAL [facility] PRIMARY CARE    NAME: Randal Rosario  AGE: 63 y o  SEX: female  : 1955     DATE: 2021     Assessment and Plan:     Problem List Items Addressed This Visit        Endocrine    Hypothyroid      Other Visit Diagnoses     Annual physical exam    -  Primary    Relevant Orders    Lipid panel    Comprehensive metabolic panel    Body mass index (BMI) less than 19 in adult        Neuropathy, arm, unspecified laterality        Relevant Orders    Ambulatory referral to Neurosurgery    Urge incontinence        Relevant Medications    nitrofurantoin (MACROBID) 100 mg capsule    Other Relevant Orders    POCT urine dip    Ambulatory referral to Gynecology    Urinalysis with microscopic    Urine culture    Urinary frequency        Relevant Medications    nitrofurantoin (MACROBID) 100 mg capsule    Other Relevant Orders    POCT urine dip    Urinalysis with microscopic    Urine culture    Cervical stenosis of spine        Relevant Orders    Ambulatory referral to Neurosurgery    Cervical disc disorder at C6-C7 level with myelopathy        Relevant Orders    Ambulatory referral to Neurosurgery    Screening for cholesterol level        Relevant Orders    Lipid panel    Well woman exam with routine gynecological exam        Relevant Orders    Ambulatory referral to Gynecology    Screening for malignant neoplasm of the rectum        Relevant Orders    Cologuard    Special screening for malignant neoplasms, colon        Relevant Orders    Cologuard          Immunizations and preventive care screenings were discussed with patient today  Appropriate education was printed on patient's after visit summary  Counseling:  · Dental Health: discussed importance of regular tooth brushing, flossing, and dental visits  BMI Counseling: Body mass index is 17 23 kg/m²  The BMI is below normal  Patient advised to gain weight         Will refer to neurosurgery regarding neck injury/abnormality/history and numbness/tingling/pain of b/l arms  She would like to call [facility] first as she never had a follow-up from her hospitalization  Screening blood work ordered  Urine dip indicates a UTI, will send urine to lab - course of Macrobid prescribed  Will begin with GYN evaluation for her urge incontinence as it occurred prior to her accident and she is concerned  She is also due for a pap smear  Will have her wean down on her wellbutrin with the next refill  Diazepam weaning will now be a 1/2 tablet twice a day to wean down to 1/2 a tablet once a day  Return in about 4 months (around 5/19/2021)  Chief Complaint:     Chief Complaint   Patient presents with    Annual Exam      History of Present Illness:     Adult Annual Physical   Patient here for a comprehensive physical exam  The patient reports Reports recent urinary frequency and increase in urinary urgency  Notes prior to fall she was with urge incontinence and would like address it along with a potential UTI  Continues with b/l arm pain/numbness/tingling - hx of cervical disc issues plus recent fall last spring - Last cervical MRI in 05/2020 indicating C6-C7 disc issue  Also would like to continue to wean diazepam and wellbutrin       Diet and Physical Activity  · Diet/Nutrition: well balanced diet  · Exercise: moderate cardio exercise  Depression Screening  PHQ-9 Depression Screening    PHQ-9:   Frequency of the following problems over the past two weeks:           General Health  · Sleep: sleeps well  · Hearing: normal - bilateral   · Vision: no vision problems  · Dental: regular dental visits  /GYN Health  · Patient is: postmenopausal  · Last estrogen/progesterone draw was in 12/2019 - will have her discuss with GYN  · Currently on hormone replacement medication  Review of Systems:     Review of Systems   Please see above, otherwise negative for this visit      Past Medical History:     Past Medical History:   Diagnosis Date    [facility] [address] [facility]     [facility]     Depression     PTSD (post-traumatic stress disorder)       Past Surgical History:     Past Surgical History:   Procedure Laterality Date    APPENDECTOMY      APPENDECTOMY      Burst surgery done open     BREAST LUMPECTOMY Right     BREAST SURGERY      BREAST SURGERY      augmentation, rupture several times, breast cancer right side, lumpectomy, radiation, right side Lat/transfer mastectomy and skin graft     MASTECTOMY Bilateral     Breast ca-had reconstruction    TONSILLECTOMY        Social History:        Social History     Socioeconomic History    Marital status: /Civil Union     Spouse name: None    Number of children: None    Years of education: None    Highest education level: None   Occupational History    None   Social Needs    Financial resource strain: None    Food insecurity     Worry: None     Inability: None    Transportation needs     Medical: None     Non-medical: None   Tobacco Use    Smoking status: Never Smoker    Smokeless tobacco: Never Used   Substance and Sexual Activity    Alcohol use: Yes     Frequency: Monthly or less     Drinks per session: 1 or 2     Binge frequency: Never     Comment: social     Drug use: No     Comment: Past 25 years ago     Sexual activity: Yes     Partners: Male   Lifestyle    Physical activity     Days per week: None     Minutes per session: None    Stress: None   Relationships    Social connections     Talks on phone: None     Gets together: None     Attends Zoroastrianism service: None     Active member of club or organization: None     Attends meetings of clubs or organizations: None     Relationship status: None    Intimate partner violence     Fear of current or ex partner: None     Emotionally abused: None     Physically abused: None     Forced sexual activity: None   Other Topics Concern    None   Social History Narrative ** Merged History Encounter **         Always uses seat belt   Daily caffeine consumption, 1 serving a day   Has smoke detectors         Family History:     Family History   Problem Relation Age of Onset    Kidney disease Mother     Osteoporosis Mother     Other Mother         Kidney surgery     Hypothyroidism Mother         other specified     No Known Problems Father     No Known Problems Sister     No Known Problems Brother     No Known Problems Maternal Grandmother     No Known Problems Maternal Grandfather     No Known Problems Paternal Grandmother     No Known Problems Paternal Grandfather       Current Medications:     Current Outpatient Medications   Medication Sig Dispense Refill    buPROPion (WELLBUTRIN) 75 mg tablet TAKE 1 TABLET BY MOUTH EVERY DAY 90 tablet 0    diazepam (VALIUM) 5 mg tablet TAKE 1 TABLET BY MOUTH EVERY 12 HOURS AS NEEDED FOR ANXIETY 90 tablet 0    estradiol (CLIMARA) 0 025 mg/24 hr APPLY 1 PATCH ONE TIME PER WEEK 12 patch 0    gabapentin (NEURONTIN) 400 mg capsule TAKE 1 CAPSULE BY MOUTH THREE TIMES DAILY 90 capsule 0    levothyroxine 50 mcg tablet TAKE 1 TABLET BY MOUTH EVERY DAY 90 tablet 0    liothyronine (CYTOMEL) 5 mcg tablet TAKE 1 TABLET (5 MCG TOTAL) BY MOUTH DAILY 90 tablet 2    progesterone (PROMETRIUM) 100 MG capsule TAKE 1 CAPSULE BY MOUTH TWICE A  capsule 3    nitrofurantoin (MACROBID) 100 mg capsule Take 1 capsule (100 mg total) by mouth 2 (two) times a day for 5 days 10 capsule 0    ZOLMitriptan (ZOMIG) 5 MG tablet TAKE 1 TABLET BY MOUTH AT ONSET OF MIGRAINE, MAY REPEAT IN 2 HOURS  3     No current facility-administered medications for this visit  Allergies:      Allergies   Allergen Reactions    Iodine Solution  [Povidone Iodine] Hives    Latex Hives    Other Allergic Rhinitis    Shellfish-Derived Products Hives    Penicillins Rash and Hives      Physical Exam:     /68 (BP Location: Right arm, Patient Position: Sitting, Cuff Size: Standard)   Pulse 84   Temp (!) 97 3 °F (36 3 °C)   Ht 5' 4" (1 626 m)   Wt 45 5 kg (100 lb 6 4 oz)   SpO2 98%   BMI 17 23 kg/m²     Physical Exam  Constitutional:       Appearance: Normal appearance  HENT:      Head: Normocephalic and atraumatic  Eyes:      Extraocular Movements: Extraocular movements intact  Conjunctiva/sclera: Conjunctivae normal       Pupils: Pupils are equal, round, and reactive to light  Neck:      Musculoskeletal: Neck supple  Cardiovascular:      Rate and Rhythm: Normal rate and regular rhythm  Heart sounds: No murmur  No gallop  Pulmonary:      Effort: Pulmonary effort is normal  No respiratory distress  Breath sounds: Normal breath sounds  No wheezing or rales  Neurological:      General: No focal deficit present  Mental Status: She is oriented to person, place, and time  Mental status is at baseline            [facility]  PRIMARY CARE

## 2021-01-19 NOTE — PATIENT INSTRUCTIONS

## 2021-01-22 LAB — BACTERIA UR CULT: ABNORMAL

## 2021-01-26 ENCOUNTER — TELEPHONE (OUTPATIENT)
Dept: FAMILY MEDICINE CLINIC | Facility: CLINIC | Age: 66
End: 2021-01-26

## 2021-01-29 NOTE — TELEPHONE ENCOUNTER
Patient will be calling GYN to set up appointment  She is going to set up with [facility]

## 2021-02-09 DIAGNOSIS — F41.1 GENERALIZED ANXIETY DISORDER: ICD-10-CM

## 2021-02-09 DIAGNOSIS — E03.9 ACQUIRED HYPOTHYROIDISM: ICD-10-CM

## 2021-02-09 DIAGNOSIS — F43.10 POST TRAUMATIC STRESS DISORDER: ICD-10-CM

## 2021-02-10 RX ORDER — LEVOTHYROXINE SODIUM 0.05 MG/1
TABLET ORAL
Qty: 90 TABLET | Refills: 0 | Status: SHIPPED | OUTPATIENT
Start: 2021-02-10 | End: 2021-06-02

## 2021-02-10 RX ORDER — DIAZEPAM 5 MG/1
TABLET ORAL
Qty: 90 TABLET | Refills: 0 | Status: SHIPPED | OUTPATIENT
Start: 2021-02-10 | End: 2021-09-08

## 2021-02-16 DIAGNOSIS — G56.90 NEUROPATHY, ARM, UNSPECIFIED LATERALITY: ICD-10-CM

## 2021-02-16 DIAGNOSIS — R20.0 ARM NUMBNESS: ICD-10-CM

## 2021-02-16 DIAGNOSIS — M43.6 NECK STIFFNESS: ICD-10-CM

## 2021-02-17 RX ORDER — GABAPENTIN 400 MG/1
CAPSULE ORAL
Qty: 90 CAPSULE | Refills: 0 | Status: SHIPPED | OUTPATIENT
Start: 2021-02-17 | End: 2021-03-31

## 2021-02-19 ENCOUNTER — TELEPHONE (OUTPATIENT)
Dept: NEUROSURGERY | Facility: CLINIC | Age: 66
End: 2021-02-19

## 2021-02-19 ENCOUNTER — OFFICE VISIT (OUTPATIENT)
Dept: NEUROSURGERY | Facility: CLINIC | Age: 66
End: 2021-02-19
Payer: COMMERCIAL

## 2021-02-19 VITALS
BODY MASS INDEX: 17.24 KG/M2 | WEIGHT: 101 LBS | TEMPERATURE: 96.6 F | HEIGHT: 64 IN | DIASTOLIC BLOOD PRESSURE: 80 MMHG | SYSTOLIC BLOOD PRESSURE: 122 MMHG

## 2021-02-19 DIAGNOSIS — M48.02 CERVICAL STENOSIS OF SPINE: ICD-10-CM

## 2021-02-19 DIAGNOSIS — Z98.1 STATUS POST CERVICAL SPINAL FUSION: ICD-10-CM

## 2021-02-19 DIAGNOSIS — S14.129A CENTRAL CORD SYNDROME, INITIAL ENCOUNTER (HCC): Primary | ICD-10-CM

## 2021-02-19 DIAGNOSIS — M50.023 CERVICAL DISC DISORDER AT C6-C7 LEVEL WITH MYELOPATHY: ICD-10-CM

## 2021-02-19 DIAGNOSIS — G56.90 NEUROPATHY, ARM, UNSPECIFIED LATERALITY: ICD-10-CM

## 2021-02-19 PROCEDURE — 99204 OFFICE O/P NEW MOD 45 MIN: CPT | Performed by: NEUROLOGICAL SURGERY

## 2021-02-19 NOTE — TELEPHONE ENCOUNTER
aSKED PATIENT IF SHE COULD COME IN EARLIER AT 2:15 HAD A CANCELLATION AND WOULD LIKE TO MOVE HER UP  PATIENT DECLINED NEEDS TO  IMAGING FROM [facility]

## 2021-03-18 ENCOUNTER — TELEPHONE (OUTPATIENT)
Dept: NEUROSURGERY | Facility: CLINIC | Age: 66
End: 2021-03-18

## 2021-03-22 ENCOUNTER — APPOINTMENT (OUTPATIENT)
Dept: LAB | Facility: CLINIC | Age: 66
End: 2021-03-22
Payer: COMMERCIAL

## 2021-03-22 ENCOUNTER — APPOINTMENT (OUTPATIENT)
Dept: RADIOLOGY | Facility: CLINIC | Age: 66
End: 2021-03-22
Payer: COMMERCIAL

## 2021-03-22 ENCOUNTER — TRANSCRIBE ORDERS (OUTPATIENT)
Dept: ADMINISTRATIVE | Facility: HOSPITAL | Age: 66
End: 2021-03-22

## 2021-03-22 DIAGNOSIS — Z00.00 ANNUAL PHYSICAL EXAM: ICD-10-CM

## 2021-03-22 DIAGNOSIS — Z98.1 STATUS POST CERVICAL SPINAL FUSION: ICD-10-CM

## 2021-03-22 DIAGNOSIS — Z13.220 SCREENING FOR CHOLESTEROL LEVEL: ICD-10-CM

## 2021-03-22 DIAGNOSIS — S14.129A CENTRAL CORD SYNDROME, INITIAL ENCOUNTER (HCC): ICD-10-CM

## 2021-03-22 LAB
ALBUMIN SERPL BCP-MCNC: 4.1 G/DL (ref 3.5–5)
ALP SERPL-CCNC: 72 U/L (ref 46–116)
ALT SERPL W P-5'-P-CCNC: 16 U/L (ref 12–78)
ANION GAP SERPL CALCULATED.3IONS-SCNC: 3 MMOL/L (ref 4–13)
AST SERPL W P-5'-P-CCNC: 13 U/L (ref 5–45)
BILIRUB SERPL-MCNC: 0.66 MG/DL (ref 0.2–1)
BUN SERPL-MCNC: 17 MG/DL (ref 5–25)
CALCIUM SERPL-MCNC: 9.3 MG/DL (ref 8.3–10.1)
CHLORIDE SERPL-SCNC: 106 MMOL/L (ref 100–108)
CHOLEST SERPL-MCNC: 211 MG/DL (ref 50–200)
CO2 SERPL-SCNC: 29 MMOL/L (ref 21–32)
CREAT SERPL-MCNC: 0.75 MG/DL (ref 0.6–1.3)
GFR SERPL CREATININE-BSD FRML MDRD: 85 ML/MIN/1.73SQ M
GLUCOSE P FAST SERPL-MCNC: 84 MG/DL (ref 65–99)
HDLC SERPL-MCNC: 124 MG/DL
LDLC SERPL CALC-MCNC: 78 MG/DL (ref 0–100)
NONHDLC SERPL-MCNC: 87 MG/DL
POTASSIUM SERPL-SCNC: 3.9 MMOL/L (ref 3.5–5.3)
PROT SERPL-MCNC: 7.5 G/DL (ref 6.4–8.2)
SODIUM SERPL-SCNC: 138 MMOL/L (ref 136–145)
TRIGL SERPL-MCNC: 47 MG/DL

## 2021-03-22 PROCEDURE — 36415 COLL VENOUS BLD VENIPUNCTURE: CPT

## 2021-03-22 PROCEDURE — 72052 X-RAY EXAM NECK SPINE 6/>VWS: CPT

## 2021-03-22 PROCEDURE — 80061 LIPID PANEL: CPT

## 2021-03-22 PROCEDURE — 80053 COMPREHEN METABOLIC PANEL: CPT

## 2021-03-26 ENCOUNTER — TELEMEDICINE (OUTPATIENT)
Dept: NEUROSURGERY | Facility: CLINIC | Age: 66
End: 2021-03-26
Payer: COMMERCIAL

## 2021-03-26 VITALS — BODY MASS INDEX: 17.07 KG/M2 | WEIGHT: 100 LBS | HEIGHT: 64 IN

## 2021-03-26 DIAGNOSIS — S14.129D CENTRAL CORD SYNDROME, SUBSEQUENT ENCOUNTER (HCC): ICD-10-CM

## 2021-03-26 DIAGNOSIS — Z98.1 STATUS POST CERVICAL SPINAL FUSION: Primary | ICD-10-CM

## 2021-03-26 PROCEDURE — 99213 OFFICE O/P EST LOW 20 MIN: CPT | Performed by: NEUROLOGICAL SURGERY

## 2021-03-26 PROCEDURE — 3008F BODY MASS INDEX DOCD: CPT | Performed by: NEUROLOGICAL SURGERY

## 2021-03-26 PROCEDURE — 1036F TOBACCO NON-USER: CPT | Performed by: NEUROLOGICAL SURGERY

## 2021-03-26 NOTE — PROGRESS NOTES
Virtual Regular Visit      Assessment/Plan:    Problem List Items Addressed This Visit        Nervous and Auditory    Central cord syndrome [facility])       Other    Status post cervical spinal fusion - Primary               Reason for visit is   Chief Complaint   Patient presents with    Virtual Regular Visit     central cord syndrome         Encounter provider Jersey Vogel MD    [facility] [address] [facility]  [address] [place] [id number] [phone]      Recent Visits  No visits were found meeting these conditions  Showing recent visits within past 7 days and meeting all other requirements     Today's Visits  Date Type Provider Dept   03/26/21 Telemedicine Jersey Vogel  [facility] today's visits and meeting all other requirements     Future Appointments  No visits were found meeting these conditions  Showing future appointments within next 150 days and meeting all other requirements        The patient was identified by name and date of birth  Elizabeth Lafleur was informed that this is a telemedicine visit and that the visit is being conducted through [employer] and patient was informed that this is not a secure, HIPAA-compliant platform  She agrees to proceed     My office door was closed  No one else was in the room  She acknowledged consent and understanding of privacy and security of the video platform  The patient has agreed to participate and understands they can discontinue the visit at any time  Patient is aware this is a billable service  Subjective  Elizabeth Lafleur is a 63 y o  female  Being seen in follow-up  She is nearly 1 year post C6-7 ACDF for traumatic spinal cord injury  She continues to have numbness and tingling in her hands and arms which is slowly improving  She has started to wean off her gabapentin  She presents today to follow-up on flexion-extension films of the cervical spine         Assessment:    Patient is gradually improving  29-year-old woman nearly 1 year post central cord syndrome and C6-7 ACDF  Fortunately she has ongoing improvement in her neurological status and is slowly weaning off her gabapentin  No gross instability on flexion-extension  I asked her to contact this office should she have any progressive decline in neurological function in her upper lower extremities  Otherwise, she will follow up on a p r n  basis  History, physical examination and diagnostic tests were reviewed and questions answered  Diagnosis, care plan and treatment options were discussed  The patient understand instructions and will follow up as directed  Plan:    Follow-up: prn    Problem List Items Addressed This Visit        Nervous and Auditory    Central cord syndrome [facility])       Other    Status post cervical spinal fusion - Primary          Subjective/Objective     Chief Complaint     29-year-old woman post traumatic spinal cord injury  She seems to have ongoing improvement in her neurological function  Upright flexion-extension films of the cervical spine demonstrate no gross instability and no evidence of hardware loosening or failure  She should remain active  Would recommend she undergo routine neurological examination with her PCP on a yearly basis  She will discuss further adjustments to her gabapentin with her PCP  If she notices progressive decline in function her upper lower extremities, then MRI of the cervical spine and we referral would be reasonable  Otherwise, she will follow up on a p r n  basis       The following portions of the patient's history were reviewed and updated as appropriate: allergies, current medications, past family history, past medical history, past social history, past surgical history and problem list     Investigations    I personally reviewed the XRAY results with the patient:      Upright flexion-extension film of cervical spine dated March 22nd, 2021  Previous C6-7 ACDF  No evidence of hardware loosening or failure  Degenerative changes throughout the cervical spine most probably at C5-6  No gross instability on flexion-extension  Past Medical History:   Diagnosis Date    [facility] [address] [facility]     [facility] [address] [facility]     Depression     PTSD (post-traumatic stress disorder)        Past Surgical History:   Procedure Laterality Date    APPENDECTOMY      APPENDECTOMY      Burst surgery done open     BREAST LUMPECTOMY Right     BREAST SURGERY      BREAST SURGERY      augmentation, rupture several times, breast cancer right side, lumpectomy, radiation, right side Lat/transfer mastectomy and skin graft     MASTECTOMY Bilateral     Breast ca-had reconstruction    TONSILLECTOMY         Current Outpatient Medications   Medication Sig Dispense Refill    buPROPion (WELLBUTRIN) 75 mg tablet TAKE 1 TABLET BY MOUTH EVERY DAY 90 tablet 0    diazepam (VALIUM) 5 mg tablet TAKE 1 TABLET BY MOUTH EVERY 12 HOURS AS NEEDED FOR ANXIETY 90 tablet 0    estradiol (CLIMARA) 0 025 mg/24 hr APPLY 1 PATCH ONE TIME PER WEEK 12 patch 0    gabapentin (NEURONTIN) 400 mg capsule TAKE 1 CAPSULE BY MOUTH THREE TIMES DAILY 90 capsule 0    levothyroxine 50 mcg tablet TAKE 1 TABLET BY MOUTH EVERY DAY 90 tablet 0    liothyronine (CYTOMEL) 5 mcg tablet TAKE 1 TABLET (5 MCG TOTAL) BY MOUTH DAILY 90 tablet 2    progesterone (PROMETRIUM) 100 MG capsule TAKE 1 CAPSULE BY MOUTH TWICE A  capsule 3    ZOLMitriptan (ZOMIG) 5 MG tablet TAKE 1 TABLET BY MOUTH AT ONSET OF MIGRAINE, MAY REPEAT IN 2 HOURS  3     No current facility-administered medications for this visit  Allergies   Allergen Reactions    Iodine Solution  [Povidone Iodine] Hives    Latex Hives    Other Allergic Rhinitis    Shellfish-Derived Products Hives    Penicillins Rash and Hives       Review of Systems   Constitutional: Negative  HENT: Negative  Eyes: Negative      Respiratory: Negative  Cardiovascular: Negative  Gastrointestinal: Negative  Endocrine: Negative  Genitourinary: Negative  Musculoskeletal: Positive for arthralgias (both hands and arms), gait problem (balance issues) and joint swelling (swelling both legs and ankles)  Skin: Negative  Allergic/Immunologic: Negative  Neurological: Positive for dizziness, weakness (both arms) and numbness (both arms, mostly the hands  )  Hematological: Negative  Psychiatric/Behavioral: Negative  Video Exam    Vitals:    03/26/21 1448   Weight: 45 4 kg (100 lb)   Height: 5' 4" (1 626 m)       Physical Exam  Constitutional:       General: She is not in acute distress  Skin:     General: Skin is dry  Neurological:      Mental Status: She is alert and oriented to person, place, and time  Psychiatric:         Mood and Affect: Mood normal          Behavior: Behavior normal           I spent 10 minutes directly with the patient during this visit      VIRTUAL VISIT DISCLAIMER    Dakota Collins acknowledges that she has consented to an online visit or consultation  She understands that the online visit is based solely on information provided by her, and that, in the absence of a face-to-face physical evaluation by the physician, the diagnosis she receives is both limited and provisional in terms of accuracy and completeness  This is not intended to replace a full medical face-to-face evaluation by the physician  Dakota Collins understands and accepts these terms

## 2021-03-30 DIAGNOSIS — Z23 ENCOUNTER FOR IMMUNIZATION: ICD-10-CM

## 2021-03-31 DIAGNOSIS — R20.0 ARM NUMBNESS: ICD-10-CM

## 2021-03-31 DIAGNOSIS — M43.6 NECK STIFFNESS: ICD-10-CM

## 2021-03-31 DIAGNOSIS — G56.90 NEUROPATHY, ARM, UNSPECIFIED LATERALITY: ICD-10-CM

## 2021-03-31 RX ORDER — GABAPENTIN 400 MG/1
CAPSULE ORAL
Qty: 90 CAPSULE | Refills: 0 | Status: SHIPPED | OUTPATIENT
Start: 2021-03-31 | End: 2021-04-08

## 2021-04-01 ENCOUNTER — IMMUNIZATIONS (OUTPATIENT)
Dept: FAMILY MEDICINE CLINIC | Facility: HOSPITAL | Age: 66
End: 2021-04-01

## 2021-04-01 DIAGNOSIS — Z23 ENCOUNTER FOR IMMUNIZATION: Primary | ICD-10-CM

## 2021-04-01 PROCEDURE — 0001A SARS-COV-2 / COVID-19 MRNA VACCINE (PFIZER-BIONTECH) 30 MCG: CPT

## 2021-04-01 PROCEDURE — 91300 SARS-COV-2 / COVID-19 MRNA VACCINE (PFIZER-BIONTECH) 30 MCG: CPT

## 2021-04-03 DIAGNOSIS — N95.1 MENOPAUSAL STATE: ICD-10-CM

## 2021-04-05 DIAGNOSIS — N95.1 MENOPAUSAL STATE: ICD-10-CM

## 2021-04-06 DIAGNOSIS — N95.1 MENOPAUSAL STATE: Primary | ICD-10-CM

## 2021-04-06 DIAGNOSIS — N95.1 MENOPAUSAL STATE: ICD-10-CM

## 2021-04-06 RX ORDER — ESTRADIOL 0.1 MG/G
0.25 CREAM VAGINAL DAILY
Qty: 42.5 G | Refills: 1 | Status: SHIPPED | OUTPATIENT
Start: 2021-04-06 | End: 2021-04-07

## 2021-04-06 NOTE — TELEPHONE ENCOUNTER
We need to have Latisha Hernandez, call her insurance company to find out what is covered as far as estrogen replacement  The estrogen vaginal cream is not covered either  Thanks

## 2021-04-06 NOTE — TELEPHONE ENCOUNTER
Can we let Roxine Pill know that the insurance is not covering her estrogen patch - can we ask her if she has tried the cream or the pill in the past, and if so which one did she not prefer or like more? Thanks

## 2021-04-07 RX ORDER — ESTRADIOL 0.1 MG/G
0.25 CREAM VAGINAL DAILY
Qty: 42.5 G | Refills: 1 | Status: SHIPPED | OUTPATIENT
Start: 2021-04-07 | End: 2021-11-30

## 2021-04-07 NOTE — TELEPHONE ENCOUNTER
Spoke to patient she states she doesn't use her insurance to pay for the medication  She wants it sent to the pharmacy and then she will use GoodRx to pay for it

## 2021-04-08 ENCOUNTER — TELEMEDICINE (OUTPATIENT)
Dept: FAMILY MEDICINE CLINIC | Facility: CLINIC | Age: 66
End: 2021-04-08
Payer: COMMERCIAL

## 2021-04-08 VITALS — HEIGHT: 64 IN | WEIGHT: 100 LBS | BODY MASS INDEX: 17.07 KG/M2

## 2021-04-08 DIAGNOSIS — M50.023 CERVICAL DISC DISORDER AT C6-C7 LEVEL WITH MYELOPATHY: Primary | ICD-10-CM

## 2021-04-08 DIAGNOSIS — G56.90 NEUROPATHY, ARM, UNSPECIFIED LATERALITY: ICD-10-CM

## 2021-04-08 DIAGNOSIS — N95.1 MENOPAUSAL STATE: ICD-10-CM

## 2021-04-08 PROCEDURE — 3008F BODY MASS INDEX DOCD: CPT | Performed by: NURSE PRACTITIONER

## 2021-04-08 PROCEDURE — 99213 OFFICE O/P EST LOW 20 MIN: CPT | Performed by: NURSE PRACTITIONER

## 2021-04-08 PROCEDURE — 1036F TOBACCO NON-USER: CPT | Performed by: NURSE PRACTITIONER

## 2021-04-08 RX ORDER — GABAPENTIN 400 MG/1
400 CAPSULE ORAL
COMMUNITY
End: 2021-05-25

## 2021-04-08 RX ORDER — GABAPENTIN 100 MG/1
300 CAPSULE ORAL DAILY
Qty: 90 CAPSULE | Refills: 1 | Status: SHIPPED | OUTPATIENT
Start: 2021-04-08 | End: 2021-06-28 | Stop reason: SDUPTHER

## 2021-04-08 NOTE — PROGRESS NOTES
Virtual Regular Visit      Assessment/Plan:    Problem List Items Addressed This Visit        Other    Menopausal state    Relevant Medications    estradiol (CLIMARA) 0 025 mg/24 hr      Other Visit Diagnoses     Cervical disc disorder at C6-C7 level with myelopathy    -  Primary    Relevant Medications    gabapentin (NEURONTIN) 100 mg capsule    Neuropathy, arm, unspecified laterality        Relevant Medications    gabapentin (NEURONTIN) 100 mg capsule           Will resend the estrogen patches to the pharmacy as she is using the Good Rx program   Will also begin to lower her dose of her neurontin to 300 mg in the morning and continue with 400 mg at bedtime  Reason for visit is   Chief Complaint   Patient presents with    Follow-up     would like to discuss recent neurology appt and medication change    Medication Management     would like to discuss estradiol cream    Virtual Regular Visit        Encounter provider SHIRA Magaña    Provider located at [address] [place] [id number]      Recent Visits  No visits were found meeting these conditions  Showing recent visits within past 7 days and meeting all other requirements     Today's Visits  Date Type Provider Dept   [facility]   Showing today's visits and meeting all other requirements     Future Appointments  No visits were found meeting these conditions  Showing future appointments within next 150 days and meeting all other requirements        The patient was identified by name and date of birth  Jasmine Villa was informed that this is a telemedicine visit and that the visit is being conducted through [address] [place] [address] Now and patient was informed that this is a secure, HIPAA-compliant platform  She agrees to proceed     My office door was closed  No one else was in the room    She acknowledged consent and understanding of privacy and security of the video platform  The patient has agreed to participate and understands they can discontinue the visit at any time  Patient is aware this is a billable service  Subjective  Braulio Bills is a 63 y o  female       Reports she was seen by neurosurgery for a re-evaluation of her neck - had a flex/extend xray completed which showed over 90 % healing  She would like to continue to wean down on her neurontin as she was having some leg swelling recently with taking it three times a day  She has decreased it to twice a day and the swelling in her legs has subsided  She would also like to switch back to the estrogen patch as it is cheaper for her             Past Medical History:   Diagnosis Date    [facility] [address] [facility]     [facility] [address] [facility]     Depression     PTSD (post-traumatic stress disorder)        Past Surgical History:   Procedure Laterality Date    APPENDECTOMY      APPENDECTOMY      Burst surgery done open     BREAST LUMPECTOMY Right     BREAST SURGERY      BREAST SURGERY      augmentation, rupture several times, breast cancer right side, lumpectomy, radiation, right side Lat/transfer mastectomy and skin graft     MASTECTOMY Bilateral     Breast ca-had reconstruction    TONSILLECTOMY         Current Outpatient Medications   Medication Sig Dispense Refill    buPROPion (WELLBUTRIN) 75 mg tablet TAKE 1 TABLET BY MOUTH EVERY DAY 90 tablet 0    diazepam (VALIUM) 5 mg tablet TAKE 1 TABLET BY MOUTH EVERY 12 HOURS AS NEEDED FOR ANXIETY 90 tablet 0    estradiol (CLIMARA) 0 025 mg/24 hr Place 1 patch on the skin once a week 12 patch 0    estradiol (ESTRACE) 0 1 mg/g vaginal cream INSERT 0 25 G INTO THE VAGINA DAILY 42 5 g 1    gabapentin (NEURONTIN) 400 mg capsule Take 400 mg by mouth daily at bedtime      levothyroxine 50 mcg tablet TAKE 1 TABLET BY MOUTH EVERY DAY 90 tablet 0    liothyronine (CYTOMEL) 5 mcg tablet TAKE 1 TABLET (5 MCG TOTAL) BY MOUTH DAILY 90 tablet 2    progesterone (PROMETRIUM) 100 MG capsule TAKE 1 CAPSULE BY MOUTH TWICE A  capsule 3    ZOLMitriptan (ZOMIG) 5 MG tablet TAKE 1 TABLET BY MOUTH AT ONSET OF MIGRAINE, MAY REPEAT IN 2 HOURS  3    gabapentin (NEURONTIN) 100 mg capsule Take 3 capsules (300 mg total) by mouth daily 90 capsule 1     No current facility-administered medications for this visit  Allergies   Allergen Reactions    Iodine Solution  [Povidone Iodine] Hives    Latex - Food Allergy Hives    Other Allergic Rhinitis    Shellfish-Derived Products - Food Allergy Hives    Penicillins Rash and Hives       Review of Systems   Musculoskeletal:        Please see HPI  Video Exam    Vitals:    04/08/21 0926   Weight: 45 4 kg (100 lb)   Height: 5' 4" (1 626 m)       Physical Exam  Constitutional:       Appearance: Normal appearance  Pulmonary:      Effort: Pulmonary effort is normal  No respiratory distress  Neurological:      Mental Status: She is alert and oriented to person, place, and time  I spent 10 minutes directly with the patient during this visit      VIRTUAL VISIT DISCLAIMER    Elif Valdes acknowledges that she has consented to an online visit or consultation  She understands that the online visit is based solely on information provided by her, and that, in the absence of a face-to-face physical evaluation by the physician, the diagnosis she receives is both limited and provisional in terms of accuracy and completeness  This is not intended to replace a full medical face-to-face evaluation by the physician  Elif Valdes understands and accepts these terms

## 2021-04-14 DIAGNOSIS — F32.A DEPRESSION: ICD-10-CM

## 2021-04-14 RX ORDER — BUPROPION HYDROCHLORIDE 75 MG/1
TABLET ORAL
Qty: 90 TABLET | Refills: 0 | Status: SHIPPED | OUTPATIENT
Start: 2021-04-14 | End: 2021-07-12

## 2021-04-27 ENCOUNTER — IMMUNIZATIONS (OUTPATIENT)
Dept: FAMILY MEDICINE CLINIC | Facility: HOSPITAL | Age: 66
End: 2021-04-27

## 2021-04-27 DIAGNOSIS — Z23 ENCOUNTER FOR IMMUNIZATION: Primary | ICD-10-CM

## 2021-04-27 PROCEDURE — 0002A SARS-COV-2 / COVID-19 MRNA VACCINE (PFIZER-BIONTECH) 30 MCG: CPT

## 2021-04-27 PROCEDURE — 91300 SARS-COV-2 / COVID-19 MRNA VACCINE (PFIZER-BIONTECH) 30 MCG: CPT

## 2021-05-25 ENCOUNTER — OFFICE VISIT (OUTPATIENT)
Dept: FAMILY MEDICINE CLINIC | Facility: CLINIC | Age: 66
End: 2021-05-25
Payer: COMMERCIAL

## 2021-05-25 VITALS
SYSTOLIC BLOOD PRESSURE: 126 MMHG | OXYGEN SATURATION: 98 % | WEIGHT: 104 LBS | HEART RATE: 78 BPM | HEIGHT: 64 IN | DIASTOLIC BLOOD PRESSURE: 82 MMHG | TEMPERATURE: 97.6 F | BODY MASS INDEX: 17.75 KG/M2

## 2021-05-25 DIAGNOSIS — G47.09 OTHER INSOMNIA: ICD-10-CM

## 2021-05-25 DIAGNOSIS — G56.90 NEUROPATHY, ARM, UNSPECIFIED LATERALITY: Primary | ICD-10-CM

## 2021-05-25 DIAGNOSIS — R20.0 ARM NUMBNESS: ICD-10-CM

## 2021-05-25 DIAGNOSIS — F32.A DEPRESSION, UNSPECIFIED DEPRESSION TYPE: ICD-10-CM

## 2021-05-25 PROBLEM — Z00.00 WELLNESS EXAMINATION: Status: RESOLVED | Noted: 2018-04-11 | Resolved: 2021-05-25

## 2021-05-25 PROCEDURE — 1036F TOBACCO NON-USER: CPT | Performed by: NURSE PRACTITIONER

## 2021-05-25 PROCEDURE — 99213 OFFICE O/P EST LOW 20 MIN: CPT | Performed by: NURSE PRACTITIONER

## 2021-05-25 PROCEDURE — 3008F BODY MASS INDEX DOCD: CPT | Performed by: NURSE PRACTITIONER

## 2021-05-25 RX ORDER — GABAPENTIN 300 MG/1
300 CAPSULE ORAL
Qty: 30 CAPSULE | Refills: 0 | Status: SHIPPED | OUTPATIENT
Start: 2021-05-25 | End: 2021-11-30

## 2021-05-25 NOTE — PROGRESS NOTES
Assessment/Plan:       Diagnoses and all orders for this visit:    Neuropathy, arm, unspecified laterality  -     gabapentin (NEURONTIN) 300 mg capsule; Take 1 capsule (300 mg total) by mouth daily at bedtime    Arm numbness  -     gabapentin (NEURONTIN) 300 mg capsule; Take 1 capsule (300 mg total) by mouth daily at bedtime    Depression, unspecified depression type    Other insomnia      Will have her decrease Gabapentin to 300 mg at bedtime, then hopefully to 200 mg in one month  Continue with Wellbutrin at 75 mg  Subjective:      Patient ID: Channing Kohler is a 61 y o  female  Here today for a follow-up  HX of central cord syndrome post fall last year - is on gabapentin to help with pain  Is having swelling with the gabapentin at higher doses  Notes swelling only in the evening now as she has lowered the daytime dose  Is asking to decrease her night time dose  Also reports her diazapem is now further decreased to 2 5 mg BID  Continues with the Wellbutrin at 75 mg  Is due for a TSH level as she is on levothyroxine - is scheduling an appt with the specialist who tests for that and her hormone levels  The following portions of the patient's history were reviewed and updated as appropriate: allergies, current medications, past family history, past medical history, past social history, past surgical history and problem list     Review of Systems   Constitutional: Negative  Respiratory: Negative  Cardiovascular: Negative  Neurological:        Please see HPI  All other systems reviewed and are negative  Objective:      /82 (BP Location: Left arm, Patient Position: Sitting, Cuff Size: Standard)   Pulse 78   Temp 97 6 °F (36 4 °C)   Ht 5' 4" (1 626 m)   Wt 47 2 kg (104 lb)   SpO2 98%   BMI 17 85 kg/m²          Physical Exam  Constitutional:       Appearance: Normal appearance  HENT:      Head: Normocephalic and atraumatic     Cardiovascular:      Rate and Rhythm: Normal rate and regular rhythm  Heart sounds: No murmur  No gallop  Pulmonary:      Effort: Pulmonary effort is normal  No respiratory distress  Breath sounds: Normal breath sounds  No wheezing or rales  Neurological:      General: No focal deficit present  Mental Status: She is alert and oriented to person, place, and time  Mental status is at baseline  Psychiatric:         Mood and Affect: Mood normal          Behavior: Behavior normal          Thought Content:  Thought content normal          Judgment: Judgment normal

## 2021-06-01 DIAGNOSIS — E03.9 ACQUIRED HYPOTHYROIDISM: ICD-10-CM

## 2021-06-02 RX ORDER — LEVOTHYROXINE SODIUM 0.05 MG/1
TABLET ORAL
Qty: 90 TABLET | Refills: 1 | Status: SHIPPED | OUTPATIENT
Start: 2021-06-02 | End: 2021-11-22

## 2021-06-28 DIAGNOSIS — G56.90 NEUROPATHY, ARM, UNSPECIFIED LATERALITY: ICD-10-CM

## 2021-06-28 DIAGNOSIS — M50.023 CERVICAL DISC DISORDER AT C6-C7 LEVEL WITH MYELOPATHY: ICD-10-CM

## 2021-06-28 RX ORDER — GABAPENTIN 100 MG/1
CAPSULE ORAL
Qty: 180 CAPSULE | Refills: 2 | Status: SHIPPED | OUTPATIENT
Start: 2021-06-28 | End: 2021-10-10

## 2021-06-28 NOTE — TELEPHONE ENCOUNTER
Patient called in for refill she is requesting the following instructions as this is how she has been taking it:      Gabapentin 200mg morning, 400mg at night       To be sent to [address] [place] [address]

## 2021-07-12 DIAGNOSIS — F32.A DEPRESSION: ICD-10-CM

## 2021-07-12 RX ORDER — BUPROPION HYDROCHLORIDE 75 MG/1
TABLET ORAL
Qty: 90 TABLET | Refills: 0 | Status: SHIPPED | OUTPATIENT
Start: 2021-07-12 | End: 2021-10-04

## 2021-07-29 ENCOUNTER — VBI (OUTPATIENT)
Dept: ADMINISTRATIVE | Facility: OTHER | Age: 66
End: 2021-07-29

## 2021-08-13 ENCOUNTER — TELEPHONE (OUTPATIENT)
Dept: FAMILY MEDICINE CLINIC | Facility: CLINIC | Age: 66
End: 2021-08-13

## 2021-08-13 NOTE — TELEPHONE ENCOUNTER
Patient called and the answering service received the call  This writer informed the patient of what was written in her chart by the [address]  The patient stated that she will just go to an urgent care

## 2021-08-13 NOTE — TELEPHONE ENCOUNTER
Patient called in regards to wasp sting she had a few days ago  Has tried creams, and benadryl  Asking if she needs a steroid to take down the swelling and redness of the sting site

## 2021-08-13 NOTE — TELEPHONE ENCOUNTER
It's hard to say of the redness is due just to the inflammation or also from an infection without seeing it first   I could see her Monday, or she should be seen at urgent care this weekend

## 2021-08-14 ENCOUNTER — OFFICE VISIT (OUTPATIENT)
Dept: URGENT CARE | Facility: CLINIC | Age: 66
End: 2021-08-14
Payer: COMMERCIAL

## 2021-08-14 VITALS
HEIGHT: 64 IN | OXYGEN SATURATION: 99 % | RESPIRATION RATE: 16 BRPM | BODY MASS INDEX: 17.07 KG/M2 | WEIGHT: 100 LBS | SYSTOLIC BLOOD PRESSURE: 138 MMHG | TEMPERATURE: 97.9 F | HEART RATE: 75 BPM | DIASTOLIC BLOOD PRESSURE: 68 MMHG

## 2021-08-14 DIAGNOSIS — L23.7 POISON IVY DERMATITIS: Primary | ICD-10-CM

## 2021-08-14 DIAGNOSIS — E03.9 ACQUIRED HYPOTHYROIDISM: ICD-10-CM

## 2021-08-14 PROCEDURE — 99213 OFFICE O/P EST LOW 20 MIN: CPT | Performed by: EMERGENCY MEDICINE

## 2021-08-14 RX ORDER — PREDNISONE 10 MG/1
TABLET ORAL
Qty: 24 TABLET | Refills: 0 | Status: SHIPPED | COMMUNITY
Start: 2021-08-14 | End: 2021-11-30

## 2021-08-14 NOTE — PROGRESS NOTES
[facility]        NAME: Poppy Lay is a 61 y o  female  : 1955    MRN: [id number]  DATE: 2021  TIME: 9:58 AM    Assessment and Plan   Poison ivy dermatitis [L23 7]  1  Poison ivy dermatitis  predniSONE 10 mg tablet         Patient Instructions     Patient Instructions   Use ice pack or cold compresses to avoid scratching  Take an H1 antihistamine like Benadryl or non-sedating antihistamine like Zyrtec, Allegra or Claritin, and an H2 antihistamine like Pepcid or Zantac for itch  If you are diabetic you should adhere strictly to your diabetic diet and monitor blood sugar closely while on prednisone and you should discontinue the prednisone if blood sugar becomes significantly elevated  Avoid nonsteroidal anti-inflammatories like Advil or Aleve while on prednisone  Use Calomine only, not Calodryl as discussed  Watch the video by Florentino Martini called How to avoid getting poison angle ever again      [employer]   WHAT YOU NEED TO KNOW:   What is poison ivy? Poison ivy is a plant that can cause an itchy, uncomfortable rash on your skin  Poison ivy grows as a shrub or vine in woods, fields, and areas of thick [place]  It has 3 bright green leaves on each stem that turn red in linda  What causes a poison ivy rash? A poison ivy rash can occur when the plant oil soaks into your skin  You may get a rash if you touch:  · Any part of the poison ivy plant: This includes the leaves, stem, vine, roots, flowers, and berries  · Pets with poison ivy on their fur:  They can spread poison ivy oil to your skin and to items inside your car and house  · Items with poison ivy oil on them: This includes clothing, shoes, camping or sports equipment, or outdoor tools  · A person with poison ivy oil on him:  Poison ivy oil may be on their skin or clothing  What can I do if I have been exposed to poison ivy?   If you think you have touched poison ivy, rinse your skin with cool water right away  Then, wash it with soap and water  Rinse your skin well  Do not use hot water because it may cause the oil to spread on your skin  You may also put rubbing alcohol or a solution of 1/2 alcohol and 1/2 water on your skin  This may help your rash to be less severe when it breaks out on your skin  What are the signs and symptoms of a poison ivy rash? · A red, swollen, itchy rash that develops within hours to days of exposure to poison ivy    · A rash that appears in thick patches or thin lines where the plant leaves rubbed against your skin    · Blisters that may leak clear to yellow liquid, then crust over and become scaly    How is a poison ivy rash treated? · Antiseptic or drying creams or ointments:  Your healthcare provider may recommend medicine to dry out the rash and decrease the itching  These products may be available without a doctor's order  · Steroids: This medicine helps decrease itching and inflammation  It can be given as a cream to apply to your skin or as a pill  · Antihistamines: This medicine may help decrease itching and help you sleep  It is available without a doctor's order  How can I manage my symptoms? · Keep your rash clean and dry:  Wash it with soap and water  Gently pat it dry with a clean towel  · Try not to scratch or rub your rash: This can cause your skin to become infected  · Use a compress on your rash:  Dip a clean washcloth in cool water  Wring it out and place it on your rash  Leave the washcloth on your skin for 15 minutes  Do this at least 3 times per day  · Take a cornstarch or oatmeal bath: If your rash is too large to cover with wet washcloths, take 3 or 4 cornstarch baths daily  Mix 1 pound of cornstarch with a little water to make a paste  Add the paste to a tub full of water and mix well  You may also use colloidal oatmeal in the bath water  Use lukewarm water   Avoid hot water because it may cause your itching to increase  Can a poison ivy rash be spread by scratching or touching it? You cannot spread poison ivy by touching your rash or the liquid from your blisters  Poison ivy is spread only if you scratch your skin while it still has oil on it  You may think your rash is spreading because new rashes appear over a number of days  This happens because areas covered by thin skin break out in a rash first  Your face or forearms may develop a rash before thicker areas, such as the palms of your hands  How can I prevent a poison ivy rash? · Wear skin protection:  Wear long pants, a long-sleeved shirt, and gloves  Use a skin block lotion to protect your skin from poison ivy oil  You can find this at a drugstore without a prescription  · Wash clothing after possible exposure: If you think you have been near a poison ivy plant, wash the clothes you were wearing separately from other clothes  Rinse the washing machine well after you take the clothes out  Scrub boots and shoes with warm, soapy water  Dry clean items and clothing that you cannot wash in water  Poison ivy oil is sticky and can stay on surfaces for a long time  It can cause a new rash even years later  · Bathe your pet:  Use warm water and shampoo on your pet's fur  This will prevent the spread of oil to your skin, car, and home  Wear long sleeves, long pants, and gloves while washing pets or any items that may have oil on them  · Reduce exposure to poison ivy:  Do not touch plants that look like poison ivy  Keep your yard free of poison ivy  While protecting your skin, remove the plant and the roots  Place them in a plastic bag and seal the bag tightly  · Do not burn poison ivy plants: This can spread the oil through the air  If you breathe the oil into your lungs, you could have swelling and serious breathing problems  Oil that clings to the fire kristin can land on your skin and cause a rash  When should I contact my healthcare provider? · You have pus, soft yellow scabs, or tenderness on the rash  · The itching gets worse or keeps you awake at night  · The rash covers more than 1/4 of your skin or spreads to your eyes, mouth, or genital area  · The rash is not better after 2 to 3 weeks  · You have tender, swollen glands on the sides of your neck  · You have swelling in your arms and legs  · You have questions or concerns about your condition or care  When should I seek immediate care or call 911? · You have a fever  · You have redness, swelling, and tenderness around the rash  · You have trouble breathing  CARE AGREEMENT:   You have the right to help plan your care  Learn about your health condition and how it may be treated  Discuss treatment options with your healthcare providers to decide what care you want to receive  You always have the right to refuse treatment  The above information is an  only  It is not intended as medical advice for individual conditions or treatments  Talk to your doctor, nurse or pharmacist before following any medical regimen to see if it is safe and effective for you  © Copyright [employer] 2021 Information is for End User's use only and may not be sold, redistributed or otherwise used for commercial purposes  All illustrations and images included in CareNotes® are the copyrighted property of [employer]  or Lorne Ferreira        Follow up with PCP in 3-5 days  Proceed to  ER if symptoms worsen  Chief Complaint     Chief Complaint   Patient presents with    Rash     rash on left chest, side, arm x 6 days  really itchy         History of Present Illness       Patient complains of pruritic rash of left chest, left shoulder for the past week, onset after poison ivy exposure      Review of Systems   Review of Systems   Constitutional: Negative for chills and fever  Respiratory: Negative for shortness of breath      Musculoskeletal: Negative for arthralgias, joint swelling, myalgias, neck pain and neck stiffness  Skin: Positive for color change and rash  Negative for wound  Neurological: Negative for dizziness and headaches  Current Medications       Current Outpatient Medications:     buPROPion (WELLBUTRIN) 75 mg tablet, TAKE 1 TABLET BY MOUTH EVERY DAY, Disp: 90 tablet, Rfl: 0    diazepam (VALIUM) 5 mg tablet, TAKE 1 TABLET BY MOUTH EVERY 12 HOURS AS NEEDED FOR ANXIETY, Disp: 90 tablet, Rfl: 0    estradiol (CLIMARA) 0 025 mg/24 hr, Place 1 patch on the skin once a week, Disp: 12 patch, Rfl: 0    gabapentin (NEURONTIN) 100 mg capsule, Take 200 mg in AM and 400 mg at bedtime  , Disp: 180 capsule, Rfl: 2    levothyroxine 50 mcg tablet, TAKE 1 TABLET BY MOUTH EVERY DAY, Disp: 90 tablet, Rfl: 1    liothyronine (CYTOMEL) 5 mcg tablet, TAKE 1 TABLET (5 MCG TOTAL) BY MOUTH DAILY, Disp: 90 tablet, Rfl: 2    progesterone (PROMETRIUM) 100 MG capsule, TAKE 1 CAPSULE BY MOUTH TWICE A DAY, Disp: 180 capsule, Rfl: 3    ZOLMitriptan (ZOMIG) 5 MG tablet, TAKE 1 TABLET BY MOUTH AT ONSET OF MIGRAINE, MAY REPEAT IN 2 HOURS, Disp: , Rfl: 3    estradiol (ESTRACE) 0 1 mg/g vaginal cream, INSERT 0 25 G INTO THE VAGINA DAILY (Patient not taking: Reported on 5/25/2021), Disp: 42 5 g, Rfl: 1    gabapentin (NEURONTIN) 300 mg capsule, Take 1 capsule (300 mg total) by mouth daily at bedtime (Patient not taking: Reported on 8/14/2021), Disp: 30 capsule, Rfl: 0    predniSONE 10 mg tablet, Take once daily all days pills on this schedule 5- 5- 4- 4- 3- 2- 1, Disp: 24 tablet, Rfl: 0    Current Allergies     Allergies as of 08/14/2021 - Reviewed 08/14/2021   Allergen Reaction Noted    Iodine solution  [povidone iodine] Hives 10/20/2017    Latex Hives     Other Allergic Rhinitis 10/20/2017    Shellfish-derived products - food allergy Hives 02/15/2018    Penicillins Rash and Hives 07/27/2016            The following portions of the patient's history were reviewed and updated as appropriate: allergies, current medications, past family history, past medical history, past social history, past surgical history and problem list      Past Medical History:   Diagnosis Date    Breast [facility] )     Breast cancer [facility]     Depression     PTSD (post-traumatic stress disorder)        Past Surgical History:   Procedure Laterality Date    APPENDECTOMY      APPENDECTOMY      Burst surgery done open     BREAST LUMPECTOMY Right     BREAST SURGERY      BREAST SURGERY      augmentation, rupture several times, breast cancer right side, lumpectomy, radiation, right side Lat/transfer mastectomy and skin graft     MASTECTOMY Bilateral     Breast ca-had reconstruction    TONSILLECTOMY         Family History   Problem Relation Age of Onset    Kidney disease Mother     Osteoporosis Mother     Other Mother         Kidney surgery     Hypothyroidism Mother         other specified     No Known Problems Father     No Known Problems Sister     No Known Problems Brother     No Known Problems Maternal Grandmother     No Known Problems Maternal Grandfather     No Known Problems Paternal Grandmother     No Known Problems Paternal Grandfather          Medications have been verified  Objective   /68   Pulse 75   Temp 97 9 °F (36 6 °C)   Resp 16   Ht 5' 4" (1 626 m)   Wt 45 4 kg (100 lb)   SpO2 99%   BMI 17 16 kg/m²        Physical Exam     Physical Exam  Vitals and nursing note reviewed  Constitutional:       General: She is not in acute distress  Appearance: She is well-developed  HENT:      Head: Normocephalic and atraumatic  Right Ear: Tympanic membrane normal       Left Ear: Tympanic membrane normal       Nose: Mucosal edema present  Mouth/Throat:      Pharynx: Posterior oropharyngeal erythema present  No oropharyngeal exudate  Tonsils: No tonsillar abscesses  Musculoskeletal:      Cervical back: Neck supple     Skin:     General: Skin is warm and dry  Findings: Erythema and rash present  Comments: Erythematous papulovesicular rash of left upper and lateral chest and left upper arm with linear distributions   Neurological:      Mental Status: She is alert and oriented to person, place, and time  Psychiatric:         Mood and Affect: Mood normal          Behavior: Behavior normal          Thought Content:  Thought content normal          Judgment: Judgment normal

## 2021-08-14 NOTE — PATIENT INSTRUCTIONS
Use ice pack or cold compresses to avoid scratching  Take an H1 antihistamine like Benadryl or non-sedating antihistamine like Zyrtec, Allegra or Claritin, and an H2 antihistamine like Pepcid or Zantac for itch  If you are diabetic you should adhere strictly to your diabetic diet and monitor blood sugar closely while on prednisone and you should discontinue the prednisone if blood sugar becomes significantly elevated  Avoid nonsteroidal anti-inflammatories like Advil or Aleve while on prednisone  Use Calomine only, not Calodryl as discussed  Watch the video by Robert Oakes called How to avoid getting poison angle ever again      [employer]   WHAT YOU NEED TO KNOW:   What is poison ivy? Poison ivy is a plant that can cause an itchy, uncomfortable rash on your skin  Poison ivy grows as a shrub or vine in woods, fields, and areas of thick [place]  It has 3 bright green leaves on each stem that turn red in [place]  What causes a poison ivy rash? A poison ivy rash can occur when the plant oil soaks into your skin  You may get a rash if you touch:  · Any part of the poison ivy plant: This includes the leaves, stem, vine, roots, flowers, and berries  · Pets with poison ivy on their fur:  They can spread poison ivy oil to your skin and to items inside your car and house  · Items with poison ivy oil on them: This includes clothing, shoes, camping or sports equipment, or outdoor tools  · A person with poison ivy oil on him:  Poison ivy oil may be on their skin or clothing  What can I do if I have been exposed to poison ivy? If you think you have touched poison ivy, rinse your skin with cool water right away  Then, wash it with soap and water  Rinse your skin well  Do not use hot water because it may cause the oil to spread on your skin  You may also put rubbing alcohol or a solution of 1/2 alcohol and 1/2 water on your skin   This may help your rash to be less severe when it breaks out on your skin  What are the signs and symptoms of a poison ivy rash? · A red, swollen, itchy rash that develops within hours to days of exposure to poison ivy    · A rash that appears in thick patches or thin lines where the plant leaves rubbed against your skin    · Blisters that may leak clear to yellow liquid, then crust over and become scaly    How is a poison ivy rash treated? · Antiseptic or drying creams or ointments:  Your healthcare provider may recommend medicine to dry out the rash and decrease the itching  These products may be available without a doctor's order  · Steroids: This medicine helps decrease itching and inflammation  It can be given as a cream to apply to your skin or as a pill  · Antihistamines: This medicine may help decrease itching and help you sleep  It is available without a doctor's order  How can I manage my symptoms? · Keep your rash clean and dry:  Wash it with soap and water  Gently pat it dry with a clean towel  · Try not to scratch or rub your rash: This can cause your skin to become infected  · Use a compress on your rash:  Dip a clean washcloth in cool water  Wring it out and place it on your rash  Leave the washcloth on your skin for 15 minutes  Do this at least 3 times per day  · Take a cornstarch or oatmeal bath: If your rash is too large to cover with wet washcloths, take 3 or 4 cornstarch baths daily  Mix 1 pound of cornstarch with a little water to make a paste  Add the paste to a tub full of water and mix well  You may also use colloidal oatmeal in the bath water  Use lukewarm water  Avoid hot water because it may cause your itching to increase  Can a poison ivy rash be spread by scratching or touching it? You cannot spread poison ivy by touching your rash or the liquid from your blisters  Poison ivy is spread only if you scratch your skin while it still has oil on it   You may think your rash is spreading because new rashes appear over a number of days  This happens because areas covered by thin skin break out in a rash first  Your face or forearms may develop a rash before thicker areas, such as the palms of your hands  How can I prevent a poison ivy rash? · Wear skin protection:  Wear long pants, a long-sleeved shirt, and gloves  Use a skin block lotion to protect your skin from poison ivy oil  You can find this at a drugstore without a prescription  · Wash clothing after possible exposure: If you think you have been near a poison ivy plant, wash the clothes you were wearing separately from other clothes  Rinse the washing machine well after you take the clothes out  Scrub boots and shoes with warm, soapy water  Dry clean items and clothing that you cannot wash in water  Poison ivy oil is sticky and can stay on surfaces for a long time  It can cause a new rash even years later  · Bathe your pet:  Use warm water and shampoo on your pet's fur  This will prevent the spread of oil to your skin, car, and home  Wear long sleeves, long pants, and gloves while washing pets or any items that may have oil on them  · Reduce exposure to poison ivy:  Do not touch plants that look like poison ivy  Keep your yard free of poison ivy  While protecting your skin, remove the plant and the roots  Place them in a plastic bag and seal the bag tightly  · Do not burn poison ivy plants: This can spread the oil through the air  If you breathe the oil into your lungs, you could have swelling and serious breathing problems  Oil that clings to the fire kristin can land on your skin and cause a rash  When should I contact my healthcare provider? · You have pus, soft yellow scabs, or tenderness on the rash  · The itching gets worse or keeps you awake at night  · The rash covers more than 1/4 of your skin or spreads to your eyes, mouth, or genital area  · The rash is not better after 2 to 3 weeks      · You have tender, swollen glands on the sides of your neck  · You have swelling in your arms and legs  · You have questions or concerns about your condition or care  When should I seek immediate care or call 911? · You have a fever  · You have redness, swelling, and tenderness around the rash  · You have trouble breathing  CARE AGREEMENT:   You have the right to help plan your care  Learn about your health condition and how it may be treated  Discuss treatment options with your healthcare providers to decide what care you want to receive  You always have the right to refuse treatment  The above information is an  only  It is not intended as medical advice for individual conditions or treatments  Talk to your doctor, nurse or pharmacist before following any medical regimen to see if it is safe and effective for you  © Copyright [employer] 2021 Information is for End User's use only and may not be sold, redistributed or otherwise used for commercial purposes   All illustrations and images included in CareNotes® are the copyrighted property of [employer]  or [address] [place] [address]

## 2021-08-16 RX ORDER — LIOTHYRONINE SODIUM 5 UG/1
TABLET ORAL
Qty: 90 TABLET | Refills: 2 | Status: SHIPPED | OUTPATIENT
Start: 2021-08-16 | End: 2022-05-09

## 2021-09-07 DIAGNOSIS — F41.1 GENERALIZED ANXIETY DISORDER: ICD-10-CM

## 2021-09-07 DIAGNOSIS — F43.10 POST TRAUMATIC STRESS DISORDER: ICD-10-CM

## 2021-09-08 DIAGNOSIS — N95.1 MENOPAUSAL STATE: ICD-10-CM

## 2021-09-08 RX ORDER — DIAZEPAM 5 MG/1
TABLET ORAL
Qty: 60 TABLET | Refills: 0 | Status: SHIPPED | OUTPATIENT
Start: 2021-09-08 | End: 2021-10-07

## 2021-10-04 DIAGNOSIS — F32.A DEPRESSION: ICD-10-CM

## 2021-10-04 RX ORDER — BUPROPION HYDROCHLORIDE 75 MG/1
TABLET ORAL
Qty: 90 TABLET | Refills: 0 | Status: SHIPPED | OUTPATIENT
Start: 2021-10-04 | End: 2022-03-12

## 2021-10-07 DIAGNOSIS — F41.1 GENERALIZED ANXIETY DISORDER: ICD-10-CM

## 2021-10-07 DIAGNOSIS — F43.10 POST TRAUMATIC STRESS DISORDER: ICD-10-CM

## 2021-10-07 RX ORDER — DIAZEPAM 5 MG/1
TABLET ORAL
Qty: 60 TABLET | Refills: 0 | Status: SHIPPED | OUTPATIENT
Start: 2021-10-07 | End: 2021-11-22

## 2021-10-09 DIAGNOSIS — M50.023 CERVICAL DISC DISORDER AT C6-C7 LEVEL WITH MYELOPATHY: ICD-10-CM

## 2021-10-09 DIAGNOSIS — G56.90 NEUROPATHY, ARM, UNSPECIFIED LATERALITY: ICD-10-CM

## 2021-10-11 RX ORDER — GABAPENTIN 100 MG/1
CAPSULE ORAL
Qty: 180 CAPSULE | Refills: 0 | Status: SHIPPED | OUTPATIENT
Start: 2021-10-11 | End: 2021-11-10

## 2021-10-14 ENCOUNTER — VBI (OUTPATIENT)
Dept: ADMINISTRATIVE | Facility: OTHER | Age: 66
End: 2021-10-14

## 2021-11-10 DIAGNOSIS — M50.023 CERVICAL DISC DISORDER AT C6-C7 LEVEL WITH MYELOPATHY: ICD-10-CM

## 2021-11-10 DIAGNOSIS — G56.90 NEUROPATHY, ARM, UNSPECIFIED LATERALITY: ICD-10-CM

## 2021-11-10 RX ORDER — GABAPENTIN 100 MG/1
CAPSULE ORAL
Qty: 180 CAPSULE | Refills: 0 | Status: SHIPPED | OUTPATIENT
Start: 2021-11-10 | End: 2021-12-16

## 2021-11-21 DIAGNOSIS — F41.1 GENERALIZED ANXIETY DISORDER: ICD-10-CM

## 2021-11-21 DIAGNOSIS — F43.10 POST TRAUMATIC STRESS DISORDER: ICD-10-CM

## 2021-11-21 DIAGNOSIS — E03.9 ACQUIRED HYPOTHYROIDISM: ICD-10-CM

## 2021-11-21 DIAGNOSIS — N95.1 MENOPAUSAL STATE: ICD-10-CM

## 2021-11-22 RX ORDER — DIAZEPAM 5 MG/1
TABLET ORAL
Qty: 60 TABLET | Refills: 0 | Status: SHIPPED | OUTPATIENT
Start: 2021-11-22 | End: 2022-01-18

## 2021-11-22 RX ORDER — LEVOTHYROXINE SODIUM 0.05 MG/1
TABLET ORAL
Qty: 90 TABLET | Refills: 1 | Status: SHIPPED | OUTPATIENT
Start: 2021-11-22 | End: 2022-06-24 | Stop reason: SDUPTHER

## 2021-11-30 ENCOUNTER — OFFICE VISIT (OUTPATIENT)
Dept: FAMILY MEDICINE CLINIC | Facility: CLINIC | Age: 66
End: 2021-11-30
Payer: COMMERCIAL

## 2021-11-30 VITALS
DIASTOLIC BLOOD PRESSURE: 86 MMHG | TEMPERATURE: 97.3 F | SYSTOLIC BLOOD PRESSURE: 138 MMHG | HEIGHT: 64 IN | WEIGHT: 106.8 LBS | BODY MASS INDEX: 18.23 KG/M2

## 2021-11-30 DIAGNOSIS — G56.90 NEUROPATHY, ARM, UNSPECIFIED LATERALITY: ICD-10-CM

## 2021-11-30 DIAGNOSIS — H53.8 BLURRED VISION: Primary | ICD-10-CM

## 2021-11-30 DIAGNOSIS — E03.9 ACQUIRED HYPOTHYROIDISM: ICD-10-CM

## 2021-11-30 DIAGNOSIS — G43.909 MIGRAINE WITHOUT STATUS MIGRAINOSUS, NOT INTRACTABLE, UNSPECIFIED MIGRAINE TYPE: ICD-10-CM

## 2021-11-30 PROBLEM — B02.9 HERPES ZOSTER WITHOUT COMPLICATION: Status: RESOLVED | Noted: 2018-03-02 | Resolved: 2021-11-30

## 2021-11-30 PROBLEM — S32.599A FRACTURE OF MULTIPLE PUBIC RAMI (HCC): Status: RESOLVED | Noted: 2018-02-15 | Resolved: 2021-11-30

## 2021-11-30 PROBLEM — M25.561 ACUTE PAIN OF RIGHT KNEE: Status: RESOLVED | Noted: 2019-06-14 | Resolved: 2021-11-30

## 2021-11-30 PROBLEM — Z98.1 STATUS POST CERVICAL SPINAL FUSION: Status: RESOLVED | Noted: 2021-02-19 | Resolved: 2021-11-30

## 2021-11-30 PROCEDURE — 99214 OFFICE O/P EST MOD 30 MIN: CPT | Performed by: NURSE PRACTITIONER

## 2021-11-30 PROCEDURE — 1036F TOBACCO NON-USER: CPT | Performed by: NURSE PRACTITIONER

## 2021-11-30 PROCEDURE — 3008F BODY MASS INDEX DOCD: CPT | Performed by: NURSE PRACTITIONER

## 2021-11-30 RX ORDER — VALACYCLOVIR HYDROCHLORIDE 500 MG/1
TABLET, FILM COATED ORAL
COMMUNITY
Start: 2021-09-21 | End: 2021-11-30

## 2021-12-14 DIAGNOSIS — N95.1 MENOPAUSAL STATE: ICD-10-CM

## 2021-12-15 DIAGNOSIS — M50.023 CERVICAL DISC DISORDER AT C6-C7 LEVEL WITH MYELOPATHY: ICD-10-CM

## 2021-12-15 DIAGNOSIS — G56.90 NEUROPATHY, ARM, UNSPECIFIED LATERALITY: ICD-10-CM

## 2021-12-16 RX ORDER — GABAPENTIN 100 MG/1
CAPSULE ORAL
Qty: 180 CAPSULE | Refills: 0 | Status: SHIPPED | OUTPATIENT
Start: 2021-12-16 | End: 2022-01-12

## 2021-12-21 ENCOUNTER — VBI (OUTPATIENT)
Dept: ADMINISTRATIVE | Facility: OTHER | Age: 66
End: 2021-12-21

## 2022-01-12 DIAGNOSIS — M50.023 CERVICAL DISC DISORDER AT C6-C7 LEVEL WITH MYELOPATHY: ICD-10-CM

## 2022-01-12 DIAGNOSIS — G56.90 NEUROPATHY, ARM, UNSPECIFIED LATERALITY: ICD-10-CM

## 2022-01-12 RX ORDER — GABAPENTIN 100 MG/1
CAPSULE ORAL
Qty: 180 CAPSULE | Refills: 0 | Status: SHIPPED | OUTPATIENT
Start: 2022-01-12 | End: 2022-02-22 | Stop reason: SDUPTHER

## 2022-01-18 DIAGNOSIS — F41.1 GENERALIZED ANXIETY DISORDER: ICD-10-CM

## 2022-01-18 DIAGNOSIS — F43.10 POST TRAUMATIC STRESS DISORDER: ICD-10-CM

## 2022-01-18 RX ORDER — DIAZEPAM 5 MG/1
TABLET ORAL
Qty: 60 TABLET | Refills: 0 | Status: SHIPPED | OUTPATIENT
Start: 2022-01-18 | End: 2022-04-29

## 2022-01-19 DIAGNOSIS — B00.1 COLD SORE: Primary | ICD-10-CM

## 2022-01-19 RX ORDER — VALACYCLOVIR HYDROCHLORIDE 500 MG/1
500 TABLET, FILM COATED ORAL 2 TIMES DAILY
Qty: 14 TABLET | Refills: 0 | Status: SHIPPED | OUTPATIENT
Start: 2022-01-19 | End: 2022-04-12 | Stop reason: SDUPTHER

## 2022-02-05 DIAGNOSIS — N95.1 MENOPAUSAL STATE: ICD-10-CM

## 2022-02-07 RX ORDER — PROGESTERONE 100 MG/1
CAPSULE ORAL
Qty: 180 CAPSULE | Refills: 3 | Status: SHIPPED | OUTPATIENT
Start: 2022-02-07 | End: 2022-06-15 | Stop reason: SDUPTHER

## 2022-02-22 DIAGNOSIS — M50.023 CERVICAL DISC DISORDER AT C6-C7 LEVEL WITH MYELOPATHY: ICD-10-CM

## 2022-02-22 DIAGNOSIS — G56.90 NEUROPATHY, ARM, UNSPECIFIED LATERALITY: ICD-10-CM

## 2022-02-22 RX ORDER — GABAPENTIN 100 MG/1
CAPSULE ORAL
Qty: 180 CAPSULE | Refills: 0 | Status: SHIPPED | OUTPATIENT
Start: 2022-02-22 | End: 2022-04-27 | Stop reason: SDUPTHER

## 2022-03-02 ENCOUNTER — VBI (OUTPATIENT)
Dept: ADMINISTRATIVE | Facility: OTHER | Age: 67
End: 2022-03-02

## 2022-03-06 DIAGNOSIS — N95.1 MENOPAUSAL STATE: ICD-10-CM

## 2022-03-12 DIAGNOSIS — F32.A DEPRESSION: ICD-10-CM

## 2022-03-12 RX ORDER — BUPROPION HYDROCHLORIDE 75 MG/1
TABLET ORAL
Qty: 90 TABLET | Refills: 0 | Status: SHIPPED | OUTPATIENT
Start: 2022-03-12 | End: 2022-06-09

## 2022-04-12 ENCOUNTER — OFFICE VISIT (OUTPATIENT)
Dept: FAMILY MEDICINE CLINIC | Facility: CLINIC | Age: 67
End: 2022-04-12
Payer: COMMERCIAL

## 2022-04-12 ENCOUNTER — APPOINTMENT (OUTPATIENT)
Dept: LAB | Facility: CLINIC | Age: 67
End: 2022-04-12
Payer: COMMERCIAL

## 2022-04-12 VITALS
SYSTOLIC BLOOD PRESSURE: 126 MMHG | OXYGEN SATURATION: 98 % | WEIGHT: 106.8 LBS | HEIGHT: 64 IN | DIASTOLIC BLOOD PRESSURE: 72 MMHG | HEART RATE: 76 BPM | BODY MASS INDEX: 18.23 KG/M2 | TEMPERATURE: 98 F

## 2022-04-12 DIAGNOSIS — R00.0 TACHYCARDIA: Primary | ICD-10-CM

## 2022-04-12 DIAGNOSIS — N39.46 MIXED STRESS AND URGE INCONTINENCE: ICD-10-CM

## 2022-04-12 DIAGNOSIS — E03.9 ACQUIRED HYPOTHYROIDISM: ICD-10-CM

## 2022-04-12 DIAGNOSIS — Z78.0 POST-MENOPAUSE: ICD-10-CM

## 2022-04-12 DIAGNOSIS — S14.129D CENTRAL CORD SYNDROME, SUBSEQUENT ENCOUNTER (HCC): ICD-10-CM

## 2022-04-12 DIAGNOSIS — Z00.00 ANNUAL PHYSICAL EXAM: ICD-10-CM

## 2022-04-12 DIAGNOSIS — B00.1 COLD SORE: ICD-10-CM

## 2022-04-12 DIAGNOSIS — G47.09 OTHER INSOMNIA: ICD-10-CM

## 2022-04-12 LAB
ALBUMIN SERPL BCP-MCNC: 4.2 G/DL (ref 3.5–5)
ALP SERPL-CCNC: 64 U/L (ref 46–116)
ALT SERPL W P-5'-P-CCNC: 16 U/L (ref 12–78)
ANION GAP SERPL CALCULATED.3IONS-SCNC: 4 MMOL/L (ref 4–13)
AST SERPL W P-5'-P-CCNC: 16 U/L (ref 5–45)
BILIRUB SERPL-MCNC: 0.7 MG/DL (ref 0.2–1)
BUN SERPL-MCNC: 12 MG/DL (ref 5–25)
CALCIUM SERPL-MCNC: 9.4 MG/DL (ref 8.3–10.1)
CHLORIDE SERPL-SCNC: 107 MMOL/L (ref 100–108)
CHOLEST SERPL-MCNC: 236 MG/DL
CO2 SERPL-SCNC: 27 MMOL/L (ref 21–32)
CREAT SERPL-MCNC: 0.82 MG/DL (ref 0.6–1.3)
GFR SERPL CREATININE-BSD FRML MDRD: 75 ML/MIN/1.73SQ M
GLUCOSE P FAST SERPL-MCNC: 90 MG/DL (ref 65–99)
HDLC SERPL-MCNC: 129 MG/DL
LDLC SERPL CALC-MCNC: 95 MG/DL (ref 0–100)
NONHDLC SERPL-MCNC: 107 MG/DL
POTASSIUM SERPL-SCNC: 4.1 MMOL/L (ref 3.5–5.3)
PROT SERPL-MCNC: 7.4 G/DL (ref 6.4–8.2)
SODIUM SERPL-SCNC: 138 MMOL/L (ref 136–145)
TRIGL SERPL-MCNC: 58 MG/DL
TSH SERPL DL<=0.05 MIU/L-ACNC: 1.4 UIU/ML (ref 0.45–4.5)

## 2022-04-12 PROCEDURE — 36415 COLL VENOUS BLD VENIPUNCTURE: CPT

## 2022-04-12 PROCEDURE — 3008F BODY MASS INDEX DOCD: CPT | Performed by: NURSE PRACTITIONER

## 2022-04-12 PROCEDURE — 1036F TOBACCO NON-USER: CPT | Performed by: NURSE PRACTITIONER

## 2022-04-12 PROCEDURE — 80061 LIPID PANEL: CPT

## 2022-04-12 PROCEDURE — 82672 ASSAY OF ESTROGEN: CPT

## 2022-04-12 PROCEDURE — 99396 PREV VISIT EST AGE 40-64: CPT | Performed by: NURSE PRACTITIONER

## 2022-04-12 PROCEDURE — 80053 COMPREHEN METABOLIC PANEL: CPT

## 2022-04-12 PROCEDURE — 84443 ASSAY THYROID STIM HORMONE: CPT

## 2022-04-12 RX ORDER — VALACYCLOVIR HYDROCHLORIDE 500 MG/1
500 TABLET, FILM COATED ORAL AS NEEDED
Qty: 30 TABLET | Refills: 1 | Status: SHIPPED | OUTPATIENT
Start: 2022-04-12 | End: 2022-05-06

## 2022-04-12 NOTE — PATIENT INSTRUCTIONS

## 2022-04-12 NOTE — PROGRESS NOTES
ADULT ANNUAL [facility] PRIMARY CARE    NAME: Philip Ibarra  AGE: 61 y o  SEX: female  : 1955     DATE: 2022     Assessment and Plan:     Problem List Items Addressed This Visit        Endocrine    Hypothyroid    Relevant Orders    TSH, 3rd generation       Nervous and Auditory    Central cord syndrome ([address] )       Other    Other insomnia      Other Visit Diagnoses     Tachycardia    -  Primary    Relevant Orders    Holter monitor    Cold sore        Relevant Medications    valACYclovir (VALTREX) 500 mg tablet    Annual physical exam        Relevant Orders    Lipid panel    Comprehensive metabolic panel    Mixed stress and urge incontinence        Post-menopause        Relevant Orders    Estrogens, total          Immunizations and preventive care screenings were discussed with patient today  Appropriate education was printed on patient's after visit summary  Counseling:  · Exercise: the importance of regular exercise/physical activity was discussed  Recommend exercise 3-5 times per week for at least 30 minutes  Screening labs ordered  Refill provided  Return in 6 months (on 10/12/2022)  Chief Complaint:     Chief Complaint   Patient presents with    Physical Exam     Patient would like to discuss her reasting HR      History of Present Illness:     Adult Annual Physical   Patient here for a comprehensive physical exam  The patient reports problems - see additional note  Diet and Physical Activity  · Diet/Nutrition: well balanced diet  · Exercise: moderate cardiovascular exercise  Depression Screening  PHQ-2/9 Depression Screening         General Health  · Sleep: sleeps well  · Hearing: normal - bilateral   · Vision: most recent eye exam <1 year ago  · Dental: regular dental visits  /GYN Health  · Patient is: postmenopausal     Review of Systems:     Review of Systems   Constitutional: Negative      HENT: Negative  Respiratory: Negative  Cardiovascular: Negative  See additional note   Gastrointestinal: Negative  Neurological: Negative  All other systems reviewed and are negative  Past Medical History:     Past Medical History:   Diagnosis Date    Allergic 2002    Shellfish    Anemia     Reoccuring    Anxiety 2004    Secondary to PTSD    [facility] [address] [address]     Breast cancer [address] [address] )     Cancer ([facility] [address] ) 21 years    Depression     Disease of thyroid gland 2010    Headache(784 0)     Sporatic    PTSD (post-traumatic stress disorder)       Past Surgical History:     Past Surgical History:   Procedure Laterality Date    APPENDECTOMY      APPENDECTOMY      Burst surgery done open     BREAST LUMPECTOMY Right     BREAST SURGERY      BREAST SURGERY      augmentation, rupture several times, breast cancer right side, lumpectomy, radiation, right side Lat/transfer mastectomy and skin graft     FRACTURE SURGERY  5/9/2020    C6 fracture surgery with fushion    LYMPH NODE BIOPSY  20+ years    MASTECTOMY Bilateral     Breast ca-had reconstruction    SPINE SURGERY  5/11/2020    See above    TONSILLECTOMY        Social History:     Social History     Socioeconomic History    Marital status: /Civil Union     Spouse name: None    Number of children: None    Years of education: None    Highest education level: None   Occupational History    None   Tobacco Use    Smoking status: Former Smoker     Packs/day: 0 00     Years: 15 00     Pack years: 0 00     Types: Cigars    Smokeless tobacco: Never Used    Tobacco comment: Occasional cigar   Vaping Use    Vaping Use: Never used   Substance and Sexual Activity    Alcohol use:  Yes     Alcohol/week: 1 0 standard drink     Types: 1 Glasses of wine per week     Comment: Not currently drinking    Drug use: No     Comment: Past 25 years ago     Sexual activity: Yes     Partners: Male     Birth control/protection: Male Sterilization   Other Topics Concern    None   Social History Narrative    ** Merged History Encounter **         Always uses seat belt   Daily caffeine consumption, 1 serving a day   Has smoke detectors        Social Determinants of Health     Financial Resource Strain: Not on file   Food Insecurity: Not on file   Transportation Needs: Not on file   Physical Activity: Not on file   Stress: Not on file   Social Connections: Not on file   Intimate Partner Violence: Not on file   Housing Stability: Not on file      Family History:     Family History   Problem Relation Age of Onset    Kidney disease Mother     Osteoporosis Mother     Other Mother         Kidney surgery     Hypothyroidism Mother         other specified     Arthritis Mother     Hearing loss Mother     No Known Problems Father     No Known Problems Sister     No Known Problems Brother     Dementia Maternal Grandmother     No Known Problems Maternal Grandfather     No Known Problems Paternal Grandmother     No Known Problems Paternal Grandfather       Current Medications:     Current Outpatient Medications   Medication Sig Dispense Refill    buPROPion (WELLBUTRIN) 75 mg tablet TAKE 1 TABLET BY MOUTH EVERY DAY 90 tablet 0    diazepam (VALIUM) 5 mg tablet TAKE 1 TABLET BY MOUTH EVERY 12 HOURS AS NEEDED FOR ANXIETY 60 tablet 0    estradiol (CLIMARA) 0 025 mg/24 hr APPLY 1 PATCH ONE TIME PER WEEK NOT COVERED 12 patch 0    gabapentin (NEURONTIN) 100 mg capsule TAKE 2 CAPSULES BY MOUTH ONCE DAILY IN THE MORNING AND 4 CAPSULES AT BEDTIME (Patient taking differently: 2 (two) times a day Take 1 capsule in the morning and 1 capsule at bedtime ) 180 capsule 0    levothyroxine 50 mcg tablet TAKE 1 TABLET BY MOUTH EVERY DAY 90 tablet 1    liothyronine (CYTOMEL) 5 mcg tablet TAKE 1 TABLET BY MOUTH EVERY DAY 90 tablet 2    Progesterone 100 MG CAPS TAKE 1 CAPSULE BY MOUTH TWICE A  capsule 3    valACYclovir (VALTREX) 500 mg tablet Take 1 tablet (500 mg total) by mouth if needed (outbreak) 30 tablet 1     No current facility-administered medications for this visit  Allergies: Allergies   Allergen Reactions    Iodine Solution  [Povidone Iodine] Hives    Latex Hives    Other Allergic Rhinitis    Shellfish-Derived Products - Food Allergy Hives    Penicillins Rash and Hives      Physical Exam:     /72 (BP Location: Left arm, Patient Position: Sitting)   Pulse 76   Temp 98 °F (36 7 °C)   Ht 5' 4" (1 626 m)   Wt 48 4 kg (106 lb 12 8 oz)   SpO2 98%   BMI 18 33 kg/m²     Physical Exam  Vitals and nursing note reviewed  Constitutional:       General: She is not in acute distress  Appearance: Normal appearance  She is well-developed  HENT:      Head: Normocephalic and atraumatic  Eyes:      Conjunctiva/sclera: Conjunctivae normal    Cardiovascular:      Rate and Rhythm: Normal rate and regular rhythm  Heart sounds: No murmur heard  No gallop  Pulmonary:      Effort: Pulmonary effort is normal  No respiratory distress  Breath sounds: Normal breath sounds  Musculoskeletal:      Cervical back: Neck supple  Skin:     General: Skin is warm and dry  Neurological:      General: No focal deficit present  Mental Status: She is alert and oriented to person, place, and time  Mental status is at baseline  Psychiatric:         Mood and Affect: Mood normal          Behavior: Behavior normal          Thought Content:  Thought content normal          Judgment: Judgment normal           SHIRA Honeycutt  [facility] PRIMARY CARE

## 2022-04-12 NOTE — PROGRESS NOTES
Assessment/Plan:       Diagnoses and all orders for this visit:    Tachycardia  -     Holter monitor; Future    Acquired hypothyroidism  -     TSH, 3rd generation; Future    Other insomnia    Cold sore  -     valACYclovir (VALTREX) 500 mg tablet; Take 1 tablet (500 mg total) by mouth if needed (outbreak)    Annual physical exam  -     Lipid panel; Future  -     Cancel: TSH, 3rd generation; Future  -     Comprehensive metabolic panel; Future    Central cord syndrome, subsequent encounter ([facility] [address] )    Mixed stress and urge incontinence    Post-menopause  -     Estrogens, total; Future      Will begin with a holter monitor at this time - possible arrhythmia causing an elevation in heart rate  Subjective:      Patient ID: Melissa Olivares is a 61 y o  female  Reports history of increased heart rate at initial onset of exercise in roughly the 130's  Is concerned as she works out often and is unsure why it is like this  She has checked her heart rate with the cardio machine and her watch and notes they matched  Denies any chest pain or dizziness  The following portions of the patient's history were reviewed and updated as appropriate: allergies, current medications, past family history, past medical history, past social history, past surgical history and problem list     Review of Systems      Review of Systems   Constitutional: Negative  HENT: Negative  Respiratory: Negative  Cardiovascular: Negative  See additional note   Gastrointestinal: Negative  Neurological: Negative  All other systems reviewed and are negative  Objective:      /72 (BP Location: Left arm, Patient Position: Sitting)   Pulse 76   Temp 98 °F (36 7 °C)   Ht 5' 4" (1 626 m)   Wt 48 4 kg (106 lb 12 8 oz)   SpO2 98%   BMI 18 33 kg/m²          Physical Exam      Physical Exam  Vitals and nursing note reviewed  Constitutional:       General: She is not in acute distress       Appearance: Normal appearance  She is well-developed  HENT:      Head: Normocephalic and atraumatic  Eyes:      Conjunctiva/sclera: Conjunctivae normal    Cardiovascular:      Rate and Rhythm: Normal rate and regular rhythm  Heart sounds: No murmur heard  No gallop  Pulmonary:      Effort: Pulmonary effort is normal  No respiratory distress  Breath sounds: Normal breath sounds  Musculoskeletal:      Cervical back: Neck supple  Skin:     General: Skin is warm and dry  Neurological:      General: No focal deficit present  Mental Status: She is alert and oriented to person, place, and time  Mental status is at baseline  Psychiatric:         Mood and Affect: Mood normal          Behavior: Behavior normal          Thought Content:  Thought content normal          Judgment: Judgment normal

## 2022-04-14 LAB — ESTROGEN SERPL-MCNC: 251 PG/ML

## 2022-04-19 ENCOUNTER — HOSPITAL ENCOUNTER (OUTPATIENT)
Dept: NON INVASIVE DIAGNOSTICS | Facility: HOSPITAL | Age: 67
Discharge: HOME | End: 2022-04-19
Payer: COMMERCIAL

## 2022-04-19 DIAGNOSIS — R00.0 TACHYCARDIA: ICD-10-CM

## 2022-04-19 PROCEDURE — 93226 XTRNL ECG REC<48 HR SCAN A/R: CPT

## 2022-04-19 PROCEDURE — 93225 XTRNL ECG REC<48 HRS REC: CPT

## 2022-04-27 DIAGNOSIS — M50.023 CERVICAL DISC DISORDER AT C6-C7 LEVEL WITH MYELOPATHY: ICD-10-CM

## 2022-04-27 DIAGNOSIS — G56.90 NEUROPATHY, ARM, UNSPECIFIED LATERALITY: ICD-10-CM

## 2022-04-27 RX ORDER — GABAPENTIN 100 MG/1
100 CAPSULE ORAL 2 TIMES DAILY
Qty: 180 CAPSULE | Refills: 1 | Status: SHIPPED | OUTPATIENT
Start: 2022-04-27 | End: 2022-06-15 | Stop reason: SDUPTHER

## 2022-04-28 DIAGNOSIS — F41.1 GENERALIZED ANXIETY DISORDER: ICD-10-CM

## 2022-04-28 DIAGNOSIS — F43.10 POST TRAUMATIC STRESS DISORDER: ICD-10-CM

## 2022-04-29 RX ORDER — DIAZEPAM 5 MG/1
TABLET ORAL
Qty: 60 TABLET | Refills: 0 | Status: SHIPPED | OUTPATIENT
Start: 2022-04-29

## 2022-05-06 DIAGNOSIS — B00.1 COLD SORE: ICD-10-CM

## 2022-05-06 RX ORDER — VALACYCLOVIR HYDROCHLORIDE 500 MG/1
500 TABLET, FILM COATED ORAL AS NEEDED
Qty: 30 TABLET | Refills: 1 | Status: SHIPPED | OUTPATIENT
Start: 2022-05-06 | End: 2022-05-19

## 2022-05-08 DIAGNOSIS — E03.9 ACQUIRED HYPOTHYROIDISM: ICD-10-CM

## 2022-05-09 RX ORDER — LIOTHYRONINE SODIUM 5 UG/1
TABLET ORAL
Qty: 90 TABLET | Refills: 2 | Status: SHIPPED | OUTPATIENT
Start: 2022-05-09 | End: 2022-06-16 | Stop reason: SDUPTHER

## 2022-05-19 DIAGNOSIS — B00.1 COLD SORE: ICD-10-CM

## 2022-05-19 RX ORDER — VALACYCLOVIR HYDROCHLORIDE 500 MG/1
500 TABLET, FILM COATED ORAL AS NEEDED
Qty: 90 TABLET | Refills: 1 | Status: SHIPPED | OUTPATIENT
Start: 2022-05-19 | End: 2022-06-21 | Stop reason: SDUPTHER

## 2022-05-31 DIAGNOSIS — N95.1 MENOPAUSAL STATE: ICD-10-CM

## 2022-06-09 DIAGNOSIS — F32.A DEPRESSION: ICD-10-CM

## 2022-06-09 RX ORDER — BUPROPION HYDROCHLORIDE 75 MG/1
TABLET ORAL
Qty: 90 TABLET | Refills: 0 | Status: SHIPPED | OUTPATIENT
Start: 2022-06-09 | End: 2022-06-15

## 2022-06-15 ENCOUNTER — OFFICE VISIT (OUTPATIENT)
Dept: FAMILY MEDICINE CLINIC | Facility: CLINIC | Age: 67
End: 2022-06-15
Payer: COMMERCIAL

## 2022-06-15 VITALS
BODY MASS INDEX: 17.69 KG/M2 | HEART RATE: 83 BPM | WEIGHT: 103.6 LBS | SYSTOLIC BLOOD PRESSURE: 130 MMHG | OXYGEN SATURATION: 88 % | HEIGHT: 64 IN | TEMPERATURE: 97.5 F | DIASTOLIC BLOOD PRESSURE: 70 MMHG

## 2022-06-15 DIAGNOSIS — G56.90 NEUROPATHY, ARM, UNSPECIFIED LATERALITY: ICD-10-CM

## 2022-06-15 DIAGNOSIS — M79.602 BILATERAL ARM PAIN: Primary | ICD-10-CM

## 2022-06-15 DIAGNOSIS — N95.1 MENOPAUSAL STATE: ICD-10-CM

## 2022-06-15 DIAGNOSIS — M50.023 CERVICAL DISC DISORDER AT C6-C7 LEVEL WITH MYELOPATHY: ICD-10-CM

## 2022-06-15 DIAGNOSIS — M79.601 BILATERAL ARM PAIN: Primary | ICD-10-CM

## 2022-06-15 DIAGNOSIS — R00.0 TACHYCARDIA: ICD-10-CM

## 2022-06-15 DIAGNOSIS — R22.41 MASS OF RIGHT LOWER LEG: ICD-10-CM

## 2022-06-15 PROCEDURE — 3008F BODY MASS INDEX DOCD: CPT | Performed by: NURSE PRACTITIONER

## 2022-06-15 PROCEDURE — 99214 OFFICE O/P EST MOD 30 MIN: CPT | Performed by: NURSE PRACTITIONER

## 2022-06-15 PROCEDURE — 1036F TOBACCO NON-USER: CPT | Performed by: NURSE PRACTITIONER

## 2022-06-15 RX ORDER — PROGESTERONE 100 MG/1
1 CAPSULE ORAL 2 TIMES DAILY
Qty: 180 CAPSULE | Refills: 3 | Status: SHIPPED | OUTPATIENT
Start: 2022-06-15 | End: 2022-06-24 | Stop reason: SDUPTHER

## 2022-06-15 RX ORDER — GABAPENTIN 100 MG/1
CAPSULE ORAL
Qty: 270 CAPSULE | Refills: 1 | Status: SHIPPED | OUTPATIENT
Start: 2022-06-15 | End: 2022-06-24 | Stop reason: SDUPTHER

## 2022-06-15 NOTE — PROGRESS NOTES
Assessment/Plan:       Diagnoses and all orders for this visit:    Bilateral arm pain    Neuropathy, arm, unspecified laterality  -     gabapentin (NEURONTIN) 100 mg capsule; Take 1 capsule in the morning and 2 capsule at bedtime    Mass of right lower leg  -     US extremity soft tissue; Future    Tachycardia    Cervical disc disorder at C6-C7 level with myelopathy  -     gabapentin (NEURONTIN) 100 mg capsule; Take 1 capsule in the morning and 2 capsule at bedtime    Menopausal state  -     Progesterone 100 MG CAPS; Take 1 capsule by mouth 2 (two) times a day     Will increase gabapentin to two tabs in pm       Unsure mass in right lower leg - possible fluid from cyst - will have US of area completed  Continue with decreased caffeine consumption in the evening  Refill provided for progesterone  Information provided for GYN to make an appt  Subjective:      Patient ID: Yesenia Almanza is a 74 y o  female  Here today due to increased b/l upper extremity pain - hx of cervical spine injury with hardware placement  She has decreased her gabapentin in the recent past but feels it is too low to help control the pain  She is also with some fluid mass in her right lower leg - did have a cyst in her right outer knee - was fluid filled and could be drained  Reports that the fluid has moved downward and is vertically placed below the knee - denies any pain or difficulty with ambulation  Arm Pain         The following portions of the patient's history were reviewed and updated as appropriate: allergies, current medications, past family history, past medical history, past social history, past surgical history and problem list     Review of Systems   Musculoskeletal:        See HPI   Skin:        See HPI   All other systems reviewed and are negative          Objective:      /70 (BP Location: Right arm, Patient Position: Sitting, Cuff Size: Standard)   Pulse 83   Temp 97 5 °F (36 4 °C) Ht 5' 4" (1 626 m)   Wt 47 kg (103 lb 9 6 oz)   SpO2 (!) 88%   BMI 17 78 kg/m²          Physical Exam  Vitals reviewed  Pulmonary:      Effort: Pulmonary effort is normal    Musculoskeletal:      Cervical back: Normal range of motion  No rigidity  Skin:         Neurological:      General: No focal deficit present  Mental Status: She is alert and oriented to person, place, and time  Mental status is at baseline  Psychiatric:         Mood and Affect: Mood normal          Behavior: Behavior normal          Thought Content:  Thought content normal          Judgment: Judgment normal

## 2022-06-16 DIAGNOSIS — E03.9 ACQUIRED HYPOTHYROIDISM: ICD-10-CM

## 2022-06-16 DIAGNOSIS — N95.1 MENOPAUSAL STATE: ICD-10-CM

## 2022-06-16 RX ORDER — LIOTHYRONINE SODIUM 5 UG/1
5 TABLET ORAL DAILY
Qty: 90 TABLET | Refills: 2 | Status: SHIPPED | OUTPATIENT
Start: 2022-06-16 | End: 2022-06-24 | Stop reason: SDUPTHER

## 2022-06-21 ENCOUNTER — HOSPITAL ENCOUNTER (OUTPATIENT)
Dept: ULTRASOUND IMAGING | Facility: HOSPITAL | Age: 67
Discharge: HOME | End: 2022-06-21
Payer: COMMERCIAL

## 2022-06-21 DIAGNOSIS — N95.1 MENOPAUSAL STATE: ICD-10-CM

## 2022-06-21 DIAGNOSIS — B00.1 COLD SORE: ICD-10-CM

## 2022-06-21 DIAGNOSIS — R22.41 MASS OF RIGHT LOWER LEG: ICD-10-CM

## 2022-06-21 PROCEDURE — 76882 US LMTD JT/FCL EVL NVASC XTR: CPT

## 2022-06-22 RX ORDER — VALACYCLOVIR HYDROCHLORIDE 500 MG/1
500 TABLET, FILM COATED ORAL AS NEEDED
Qty: 90 TABLET | Refills: 1 | Status: SHIPPED | OUTPATIENT
Start: 2022-06-22 | End: 2022-07-05 | Stop reason: SDUPTHER

## 2022-06-23 DIAGNOSIS — M25.561 ACUTE PAIN OF RIGHT KNEE: Primary | ICD-10-CM

## 2022-06-23 DIAGNOSIS — M71.21 SYNOVIAL CYST OF RIGHT KNEE: ICD-10-CM

## 2022-06-24 DIAGNOSIS — G56.90 NEUROPATHY, ARM, UNSPECIFIED LATERALITY: ICD-10-CM

## 2022-06-24 DIAGNOSIS — N95.1 MENOPAUSAL STATE: ICD-10-CM

## 2022-06-24 DIAGNOSIS — M50.023 CERVICAL DISC DISORDER AT C6-C7 LEVEL WITH MYELOPATHY: ICD-10-CM

## 2022-06-24 DIAGNOSIS — E03.9 ACQUIRED HYPOTHYROIDISM: ICD-10-CM

## 2022-06-24 RX ORDER — PROGESTERONE 100 MG/1
1 CAPSULE ORAL 2 TIMES DAILY
Qty: 180 CAPSULE | Refills: 3 | Status: SHIPPED | OUTPATIENT
Start: 2022-06-24

## 2022-06-24 RX ORDER — LIOTHYRONINE SODIUM 5 UG/1
5 TABLET ORAL DAILY
Qty: 90 TABLET | Refills: 2 | Status: SHIPPED | OUTPATIENT
Start: 2022-06-24

## 2022-06-24 RX ORDER — GABAPENTIN 100 MG/1
CAPSULE ORAL
Qty: 270 CAPSULE | Refills: 1 | Status: SHIPPED | OUTPATIENT
Start: 2022-06-24

## 2022-06-24 RX ORDER — LEVOTHYROXINE SODIUM 0.05 MG/1
50 TABLET ORAL DAILY
Qty: 90 TABLET | Refills: 1 | Status: SHIPPED | OUTPATIENT
Start: 2022-06-24

## 2022-06-28 ENCOUNTER — HOSPITAL ENCOUNTER (OUTPATIENT)
Dept: MRI IMAGING | Facility: HOSPITAL | Age: 67
Discharge: HOME | End: 2022-06-28
Payer: COMMERCIAL

## 2022-06-28 DIAGNOSIS — M71.21 SYNOVIAL CYST OF RIGHT KNEE: ICD-10-CM

## 2022-06-28 DIAGNOSIS — M25.561 ACUTE PAIN OF RIGHT KNEE: ICD-10-CM

## 2022-06-28 PROCEDURE — 73721 MRI JNT OF LWR EXTRE W/O DYE: CPT

## 2022-06-29 DIAGNOSIS — M25.561 ACUTE PAIN OF RIGHT KNEE: ICD-10-CM

## 2022-06-29 DIAGNOSIS — M67.461 GANGLION, RIGHT KNEE: Primary | ICD-10-CM

## 2022-07-05 DIAGNOSIS — B00.1 COLD SORE: ICD-10-CM

## 2022-07-05 RX ORDER — VALACYCLOVIR HYDROCHLORIDE 500 MG/1
500 TABLET, FILM COATED ORAL AS NEEDED
Qty: 90 TABLET | Refills: 1 | Status: SHIPPED | OUTPATIENT
Start: 2022-07-05 | End: 2022-10-21

## 2022-07-21 ENCOUNTER — OFFICE VISIT (OUTPATIENT)
Dept: OBGYN CLINIC | Facility: CLINIC | Age: 67
End: 2022-07-21
Payer: COMMERCIAL

## 2022-07-21 VITALS
HEART RATE: 76 BPM | DIASTOLIC BLOOD PRESSURE: 72 MMHG | HEIGHT: 64 IN | WEIGHT: 105.2 LBS | BODY MASS INDEX: 17.96 KG/M2 | SYSTOLIC BLOOD PRESSURE: 118 MMHG | TEMPERATURE: 98.3 F

## 2022-07-21 DIAGNOSIS — M67.461 GANGLION, RIGHT KNEE: ICD-10-CM

## 2022-07-21 DIAGNOSIS — M25.561 ACUTE PAIN OF RIGHT KNEE: ICD-10-CM

## 2022-07-21 PROCEDURE — 99204 OFFICE O/P NEW MOD 45 MIN: CPT | Performed by: ORTHOPAEDIC SURGERY

## 2022-07-21 PROCEDURE — 3008F BODY MASS INDEX DOCD: CPT | Performed by: ORTHOPAEDIC SURGERY

## 2022-07-21 PROCEDURE — 1036F TOBACCO NON-USER: CPT | Performed by: ORTHOPAEDIC SURGERY

## 2022-07-21 NOTE — PROGRESS NOTES
ASSESSMENT/PLAN:    Diagnoses and all orders for this visit:    Ganglion, right knee  -     Ambulatory Referral to Orthopedic Surgery    Acute pain of right knee  -     Ambulatory Referral to Orthopedic Surgery    Plan:  I have strongly suggested that she stop aspirating the cyst on her own  Surgical excision would be reasonable as the cyst does continue to recur despite multiple attempts at aspiration  She states that when the cyst does get larger, it causes pain and interferes with her daily activity  Although she does wish to pursue surgery, the cyst is relatively small at this time and she is traveling to [facility], [place] for her father's    I will see her back as needed  If the cyst does increase in size and become symptomatic, the office should be contacted and I will be happy to see her and we can schedule surgical excision  _____________________________________________________  CHIEF COMPLAINT:  Chief Complaint   Patient presents with    Right Knee - Pain         SUBJECTIVE:  Channing Kohler is a 74y o  year old female who presents for follow up of ganglion cyst of lateral aspect of right knee  She was last seen in regards to this issue on 2019 at which time she was 2 weeks s/p aspiration of the cyst   Since last visit, Channing Kohler states that she has continued to have waxing and waning swelling of the ganglion cyst in the lateral portion of the right knee  She states that she does not have pain with activity, but rather experiences achy, pressure type pain with direct pressure applied over the cyst   She denies any associated bruising, numbness, tingling, or mechanical symptoms  She states that she has drained the cyst herself, repeatedly, using an insulin needle  She states that when she does aspirated she always gets clear fluid with no blood and generally gets approximately 10 cc of fluid    Due to the recurrent nature of the cyst, she recently reported swelling of the cyst to her PCP, who referred her for MRI and ultrasound of the right knee  Her diagnostic imaging confirms the presence of a ganglion cyst   Since the ultrasound an MRI were obtained, she did drain the cyst   She states that she has had to drain the cyst perhaps 4 times in the past year       PAST MEDICAL HISTORY:  Past Medical History:   Diagnosis Date    Allergic 2002    Shellfish    Anemia     Reoccuring    Anxiety 2004    Secondary to PTSD    Breast cancer ([address] )     Breast cancer ([address] )     Cancer ([address] ) 21 years    Depression     Disease of thyroid gland 2010    Headache(784 0)     Sporatic    PTSD (post-traumatic stress disorder)        PAST SURGICAL HISTORY:  Past Surgical History:   Procedure Laterality Date    APPENDECTOMY      APPENDECTOMY      Burst surgery done open     BREAST LUMPECTOMY Right     BREAST SURGERY      BREAST SURGERY      augmentation, rupture several times, breast cancer right side, lumpectomy, radiation, right side Lat/transfer mastectomy and skin graft     FRACTURE SURGERY  5/9/2020    C6 fracture surgery with fushion    LYMPH NODE BIOPSY  20+ years    MASTECTOMY Bilateral     Breast ca-had reconstruction    SPINE SURGERY  5/11/2020    See above    TONSILLECTOMY         FAMILY HISTORY:  Family History   Problem Relation Age of Onset    Kidney disease Mother     Osteoporosis Mother     Other Mother         Kidney surgery     Hypothyroidism Mother         other specified     Arthritis Mother     Hearing loss Mother     No Known Problems Father     No Known Problems Sister     No Known Problems Brother     Dementia Maternal Grandmother     No Known Problems Maternal Grandfather     No Known Problems Paternal Grandmother     No Known Problems Paternal Grandfather        SOCIAL HISTORY:  Social History     Tobacco Use    Smoking status: Former Smoker     Packs/day: 0 00     Years: 15 00     Pack years: 0 00     Types: Cigars    Smokeless tobacco: Never Used    Tobacco comment: Occasional cigar   Vaping Use    Vaping Use: Never used   Substance Use Topics    Alcohol use: Yes     Alcohol/week: 1 0 standard drink     Types: 1 Glasses of wine per week     Comment: Not currently drinking    Drug use: No     Comment: Past 25 years ago        MEDICATIONS:    Current Outpatient Medications:     diazepam (VALIUM) 5 mg tablet, TAKE 1 TABLET BY MOUTH EVERY 12 HOURS AS NEEDED FOR ANXIETY, Disp: 60 tablet, Rfl: 0    estradiol (CLIMARA) 0 025 mg/24 hr, Place 1 patch on the skin once a week, Disp: 12 patch, Rfl: 0    gabapentin (NEURONTIN) 100 mg capsule, Take 1 capsule in the morning and 2 capsule at bedtime, Disp: 270 capsule, Rfl: 1    levothyroxine 50 mcg tablet, Take 1 tablet (50 mcg total) by mouth daily, Disp: 90 tablet, Rfl: 1    liothyronine (CYTOMEL) 5 mcg tablet, Take 1 tablet (5 mcg total) by mouth daily, Disp: 90 tablet, Rfl: 2    Progesterone 100 MG CAPS, Take 1 capsule by mouth 2 (two) times a day, Disp: 180 capsule, Rfl: 3    valACYclovir (VALTREX) 500 mg tablet, Take 1 tablet (500 mg total) by mouth if needed (outbreak), Disp: 90 tablet, Rfl: 1    ALLERGIES:  Allergies   Allergen Reactions    Iodine Solution  [Povidone Iodine] Hives    Latex Hives    Other Allergic Rhinitis    Shellfish-Derived Products - Food Allergy Hives    Penicillins Rash and Hives       REVIEW OF SYSTEMS:  Pertinent items are noted in HPI    A comprehensive review of systems was negative       _____________________________________________________  PHYSICAL EXAMINATION:  General: well developed and well nourished, alert, oriented times 3 and appears comfortable  Psychiatric: Normal  HEENT:  Benign  Cardiovascular:  Normal rate  Pulmonary: No wheezing or stridor  Skin: No masses, erythema, lacerations, fluctation, ulcerations  Neurovascular:  Distal pulses, DTRs intact    MUSCULOSKELETAL EXAMINATION:  Right knee -   Patient ambulates with normal gait pattern  Uses no assistive device  Visible ganglion inferior and anterior to the fibular head measuring 2 5 mm in diameter  Skin is warm and dry to touch with no signs of erythema, ecchymosis, infection  ROM 0°-125°  Minimally tender over fibular head, minimally tender over proximal tibial fibular joint, palpable ganglion cyst inferior and anterior the fibular head  Flexor and extensor mechanisms intact  Knee is stable to varus and valgus stress  - Lachman's  - Anterior Drawer, - Posterior Drawer  - medial Sugey's, - lateral Sugey's  - Pivot Shift  Patella tracks centrally without palpable crepitus  Calf compartments are soft and supple  2+ TP and DP pulses with brisk capillary refill to the toes  Sural, saphenous, tibial, superficial and deep peroneal motor and sensory distributions intact  Sensation light touch intact distally    _____________________________________________________  STUDIES REVIEWED:  Attending Physician has personally reviewed pertinent imaging in PACS, impression is as follows:    Review of MRI series taken 6/28/2022 of the right knee shows subcutaneous ganglion cyst inferior and anterior to the fibular head lateral portion of the right lower extremity    Review [facility] ultrasound performed 6/21/2022 the right knee shows subcutaneous, multilobulated, ganglion cyst potentially stemming from the proximal tibial fibular joint          Scribe Attestation    I,:   am acting as a scribe while in the presence of the attending physician :       I,:   personally performed the services described in this documentation    as scribed in my presence :

## 2022-08-14 DIAGNOSIS — F43.10 POST TRAUMATIC STRESS DISORDER: ICD-10-CM

## 2022-08-14 DIAGNOSIS — F41.1 GENERALIZED ANXIETY DISORDER: ICD-10-CM

## 2022-08-15 RX ORDER — DIAZEPAM 5 MG/1
TABLET ORAL
Qty: 60 TABLET | Refills: 0 | Status: SHIPPED | OUTPATIENT
Start: 2022-08-15 | End: 2022-10-26

## 2022-08-31 ENCOUNTER — VBI (OUTPATIENT)
Dept: ADMINISTRATIVE | Facility: OTHER | Age: 67
End: 2022-08-31

## 2022-09-19 DIAGNOSIS — N95.1 MENOPAUSAL STATE: ICD-10-CM

## 2022-10-11 NOTE — RESULT NOTES
Verified Results  (1) TSH [id number] 12:30PM [facility]   TW Order Number: [id number]     Test Name Result Flag Reference   TSH 2 150 uIU/mL  0 358-3 740   Patients undergoing fluorescein dye angiography may retain small amounts of fluorescein in the body for 48-72 hours post procedure  Samples containing fluorescein can produce falsely depressed TSH values  If the patient had this procedure,a specimen should be resubmitted post fluorescein clearance  The recommended reference ranges for TSH during pregnancy are as follows:  First trimester 0 1 to 2 5 uIU/mL  Second trimester  0 2 to 3 0 uIU/mL  Third trimester 0 3 to 3 0 uIU/m     (1) T4, FREE [id number] 12:30PM [facility]   TW Order Number: [id number]     Test Name Result Flag Reference   T4,FREE 0 83 ng/dL  0 76-1 46   Specimen collection should occur prior to Sulfasalazine administration due to the potential for falsely elevated results       (1) CBC/ PLT (NO DIFF) [id number] 12:30PM [facility] Order Number: [id number]     Test Name Result Flag Reference   HEMATOCRIT 39 7 %  34 8-46 1   HEMOGLOBIN 13 9 g/dL  11 5-15 4   MCHC 35 0 g/dL  31 4-37 4   MCH 33 3 pg  26 8-34 3   MCV 95 fL  82-98   PLATELET COUNT 913 Thousands/uL  149-390   RBC COUNT 4 17 Million/uL  3 81-5 12   RDW 12 9 %  11 6-15 1   WBC COUNT 7 35 Thousand/uL  4 31-10 16   MPV 10 2 fL  8 9-12 7     (1) COMPREHENSIVE METABOLIC PANEL [id number] 65:24VO [facility] Order Number: [id number]     Test Name Result Flag Reference   SODIUM 140 mmol/L  136-145   POTASSIUM 3 8 mmol/L  3 5-5 3   CHLORIDE 107 mmol/L  100-108   CARBON DIOXIDE 24 mmol/L  21-32   ANION GAP (CALC) 9 mmol/L  4-13   BLOOD UREA NITROGEN 16 mg/dL  5-25   CREATININE 0 76 mg/dL  0 60-1 30   Standardized to IDMS reference method   CALCIUM 9 1 mg/dL  8 3-10 1   BILI, TOTAL 0 50 mg/dL  0 20-1 00   ALK PHOSPHATAS 85 U/L     ALT (SGPT) 15 U/L  12-78   Specimen collection should occur prior to Sulfasalazine and/or Sulfapyridine administration due to the potential for falsely depressed results  AST(SGOT) 13 U/L  5-45   Specimen collection should occur prior to Sulfasalazine administration due to the potential for falsely depressed results  ALBUMIN 3 9 g/dL  3 5-5 0   TOTAL PROTEIN 7 5 g/dL  6 4-8 2   eGFR 86 ml/min/1 73sq m     National Kidney Disease Education Program recommendations are as follows:  GFR calculation is accurate only with a steady state creatinine  Chronic Kidney disease less than 60 ml/min/1 73 sq  meters  Kidney failure less than 15 ml/min/1 73 sq  meters  GLUCOSE FASTING 112 mg/dL H 65-99   Specimen collection should occur prior to Sulfasalazine administration due to the potential for falsely depressed results  Specimen collection should occur prior to Sulfapyridine administration due to the potential for falsely elevated results       (1) T3 TOTAL [id number] 12:30PM [facility] Order Number: [id number]     Test Name Result Flag Reference   T3 0 90 ng/mL  0 60-1 80     (1) FREE T3 [id number] 12:30PM [facility] Order Number: [id number]     Test Name Result Flag Reference   FREE T3 2 64 pg/mL  2 30-4 20 94

## 2022-10-21 ENCOUNTER — OFFICE VISIT (OUTPATIENT)
Dept: FAMILY MEDICINE CLINIC | Facility: CLINIC | Age: 67
End: 2022-10-21
Payer: COMMERCIAL

## 2022-10-21 VITALS
TEMPERATURE: 97.6 F | SYSTOLIC BLOOD PRESSURE: 128 MMHG | WEIGHT: 110 LBS | BODY MASS INDEX: 18.78 KG/M2 | HEIGHT: 64 IN | HEART RATE: 83 BPM | OXYGEN SATURATION: 98 % | DIASTOLIC BLOOD PRESSURE: 78 MMHG

## 2022-10-21 DIAGNOSIS — N39.3 STRESS INCONTINENCE, FEMALE: ICD-10-CM

## 2022-10-21 DIAGNOSIS — Z13.820 ENCOUNTER FOR OSTEOPOROSIS SCREENING IN ASYMPTOMATIC POSTMENOPAUSAL PATIENT: ICD-10-CM

## 2022-10-21 DIAGNOSIS — Z78.0 POSTMENOPAUSAL: ICD-10-CM

## 2022-10-21 DIAGNOSIS — E03.9 ACQUIRED HYPOTHYROIDISM: ICD-10-CM

## 2022-10-21 DIAGNOSIS — N95.1 MENOPAUSAL STATE: ICD-10-CM

## 2022-10-21 DIAGNOSIS — Z78.0 ENCOUNTER FOR OSTEOPOROSIS SCREENING IN ASYMPTOMATIC POSTMENOPAUSAL PATIENT: ICD-10-CM

## 2022-10-21 DIAGNOSIS — Z00.00 WELCOME TO MEDICARE PREVENTIVE VISIT: Primary | ICD-10-CM

## 2022-10-21 DIAGNOSIS — Z23 NEEDS FLU SHOT: ICD-10-CM

## 2022-10-21 DIAGNOSIS — G47.09 OTHER INSOMNIA: ICD-10-CM

## 2022-10-21 PROCEDURE — 93000 ELECTROCARDIOGRAM COMPLETE: CPT | Performed by: NURSE PRACTITIONER

## 2022-10-21 PROCEDURE — G0438 PPPS, INITIAL VISIT: HCPCS | Performed by: NURSE PRACTITIONER

## 2022-10-21 RX ORDER — PROGESTERONE 100 MG/1
1 CAPSULE ORAL 2 TIMES DAILY
Qty: 180 CAPSULE | Refills: 3 | Status: SHIPPED | OUTPATIENT
Start: 2022-10-21

## 2022-10-21 NOTE — PATIENT INSTRUCTIONS
Medicare Preventive Visit Patient Instructions  Thank you for completing your Welcome to Medicare Visit or Medicare Annual Wellness Visit today  Your next wellness visit will be due in one year (10/22/2023)  The screening/preventive services that you may require over the next 5-10 years are detailed below  Some tests may not apply to you based off risk factors and/or age  Screening tests ordered at today's visit but not completed yet may show as past due  Also, please note that scanned in results may not display below  Preventive Screenings:  Service Recommendations Previous Testing/Comments   Colorectal Cancer Screening  * Colonoscopy    * Fecal Occult Blood Test (FOBT)/Fecal Immunochemical Test (FIT)  * Fecal DNA/Cologuard Test  * Flexible Sigmoidoscopy Age: 39-70 years old   Colonoscopy: every 10 years (may be performed more frequently if at higher risk)  OR  FOBT/FIT: every 1 year  OR  Cologuard: every 3 years  OR  Sigmoidoscopy: every 5 years  Screening may be recommended earlier than age 39 if at higher risk for colorectal cancer  Also, an individualized decision between you and your healthcare provider will decide whether screening between the ages of 74-80 would be appropriate  Colonoscopy: 10/17/2019  FOBT/FIT: Not on file  Cologuard: 02/03/2021  Sigmoidoscopy: Not on file          Breast Cancer Screening Age: 36 years old  Frequency: every 1-2 years  Not required if history of left and right mastectomy Mammogram: Not on file        Cervical Cancer Screening Between the ages of 21-29, pap smear recommended once every 3 years  Between the ages of 33-67, can perform pap smear with HPV co-testing every 5 years     Recommendations may differ for women with a history of total hysterectomy, cervical cancer, or abnormal pap smears in past  Pap Smear: Not on file        Hepatitis C Screening Once for adults born between [facility]  More frequently in patients at high risk for Hepatitis C Hep C Antibody: 05/24/2019        Diabetes Screening 1-2 times per year if you're at risk for diabetes or have pre-diabetes Fasting glucose: 90 mg/dL (4/12/2022)  A1C: No results in last 5 years (No results in last 5 years)      Cholesterol Screening Once every 5 years if you don't have a lipid disorder  May order more often based on risk factors  Lipid panel: 04/12/2022          Other Preventive Screenings Covered by Medicare:  1  Abdominal Aortic Aneurysm (AAA) Screening: covered once if your at risk  You're considered to be at risk if you have a family history of AAA  2  Lung Cancer Screening: covers low dose CT scan once per year if you meet all of the following conditions: (1) Age 50-69; (2) No signs or symptoms of lung cancer; (3) Current smoker or have quit smoking within the last 15 years; (4) You have a tobacco smoking history of at least 20 pack years (packs per day multiplied by number of years you smoked); (5) You get a written order from a healthcare provider  3  Glaucoma Screening: covered annually if you're considered high risk: (1) You have diabetes OR (2) Family history of glaucoma OR (3)  aged 48 and older OR (3)  American aged 72 and older  3  Osteoporosis Screening: covered every 2 years if you meet one of the following conditions: (1) You're estrogen deficient and at risk for osteoporosis based off medical history and other findings; (2) Have a vertebral abnormality; (3) On glucocorticoid therapy for more than 3 months; (4) Have primary hyperparathyroidism; (5) On osteoporosis medications and need to assess response to drug therapy  · Last bone density test (DXA Scan): Not on file  5  HIV Screening: covered annually if you're between the age of 12-76  Also covered annually if you are younger than 13 and older than 72 with risk factors for HIV infection  For pregnant patients, it is covered up to 3 times per pregnancy      Immunizations:  Immunization Recommendations   Influenza Vaccine Annual influenza vaccination during flu season is recommended for all persons aged >= 6 months who do not have contraindications   Pneumococcal Vaccine   * Pneumococcal conjugate vaccine = PCV13 (Prevnar 13), PCV15 (Vaxneuvance), PCV20 (Prevnar 20)  * Pneumococcal polysaccharide vaccine = PPSV23 (Pneumovax) Adults 25-60 years old: 1-3 doses may be recommended based on certain risk factors  Adults 72 years old: 1-2 doses may be recommended based off what pneumonia vaccine you previously received   Hepatitis B Vaccine 3 dose series if at intermediate or high risk (ex: diabetes, end stage renal disease, liver disease)   Tetanus (Td) Vaccine - COST NOT COVERED BY MEDICARE PART B Following completion of primary series, a booster dose should be given every 10 years to maintain immunity against tetanus  Td may also be given as tetanus wound prophylaxis  Tdap Vaccine - COST NOT COVERED BY MEDICARE PART B Recommended at least once for all adults  For pregnant patients, recommended with each pregnancy  Shingles Vaccine (Shingrix) - COST NOT COVERED BY MEDICARE PART B  2 shot series recommended in those aged 48 and above     Health Maintenance Due:      Topic Date Due   • HIV Screening  Never done   • Cervical Cancer Screening  Never done   • Colorectal Cancer Screening  02/03/2024   • Hepatitis C Screening  Completed     Immunizations Due:      Topic Date Due   • Pneumococcal Vaccine: 65+ Years (1 - PCV) Never done   • Influenza Vaccine (1) 09/01/2022     Advance Directives   What are advance directives? Advance directives are legal documents that state your wishes and plans for medical care  These plans are made ahead of time in case you lose your ability to make decisions for yourself  Advance directives can apply to any medical decision, such as the treatments you want, and if you want to donate organs  What are the types of advance directives?   There are many types of advance directives, and each state has rules about how to use them  You may choose a combination of any of the following:  · Living will: This is a written record of the treatment you want  You can also choose which treatments you do not want, which to limit, and which to stop at a certain time  This includes surgery, medicine, IV fluid, and tube feedings  · Durable power of  for healthcare [facility]): This is a written record that states who you want to make healthcare choices for you when you are unable to make them for yourself  This person, called a proxy, is usually a family member or a friend  You may choose more than 1 proxy  · Do not resuscitate (DNR) order:  A DNR order is used in case your heart stops beating or you stop breathing  It is a request not to have certain forms of treatment, such as CPR  A DNR order may be included in other types of advance directives  · Medical directive: This covers the care that you want if you are in a coma, near death, or unable to make decisions for yourself  You can list the treatments you want for each condition  Treatment may include pain medicine, surgery, blood transfusions, dialysis, IV or tube feedings, and a ventilator (breathing machine)  · Values history: This document has questions about your views, beliefs, and how you feel and think about life  This information can help others choose the care that you would choose  Why are advance directives important? An advance directive helps you control your care  Although spoken wishes may be used, it is better to have your wishes written down  Spoken wishes can be misunderstood, or not followed  Treatments may be given even if you do not want them  An advance directive may make it easier for your family to make difficult choices about your care  Underweight  Underweight is defined as having a body mass index (BMI) of less than 18 5 kg/m2   Anorexia  is a loss of appetite, decreased food intake, or both   Your appetite naturally decreases as you get older  You also get full faster than you used to  This occurs because your body needs less energy  Other body changes can also lead to a decreased appetite  Even though some appetite loss is normal, you still need to get enough calories and nutrients to keep you healthy  You can start to lose too much weight if you do not eat as much food as your body needs  Unwanted weight loss can cause health problems, or worsen health problems you already have  You can also become dehydrated if you do not drink enough liquid  How to eat healthy and get enough nutrients:   · Choose healthy foods  Eat a variety of fruits, vegetables, whole grains, low-fat dairy foods, lean meats, and other protein foods  Limit foods high in fat, sugar, and salt  Limit or avoid alcohol as directed  Work with a dietitian to help you plan your meals if you need to follow a special diet  A dietitian can also teach you how to modify foods if you have trouble chewing or swallowing  · Snack on healthy foods between meals  if you only eat a small amount during meals  Snacks provide extra healthy nutrients and calories between meals  Examples include fruit, cheese, and whole grain crackers  · Drink liquids as directed  to avoid dehydration  Drink liquids between meals if they cause you to get full too quickly during meals  Ask how much liquid to drink each day and which liquids are best for you  · Use herbs, spices, and flavor enhancers to add flavor to foods  Avoid using herbs and spice blends that also contain sodium  Ask your healthcare provider or dietitian about flavor enhancers  Flavor enhancers with ham, natural austin, and roast beef flavors can also be sprinkled on food to add flavor  · Share meals with others as often as you can  Eating with others may help you to eat better during meal time  Ask family members, neighbors, or friends to join you for lunch   There are also senior centers where you can meet people, and share meals with them  · Ask family and friends for help  with shopping or preparing foods  Ask for a ride to the grocery store, if needed  © Copyright [employer] 2018 Information is for End User's use only and may not be sold, redistributed or otherwise used for commercial purposes   All illustrations and images included in CareNotes® are the copyrighted property of A D A M , Inc  or [address] [place] [address]

## 2022-10-21 NOTE — PROGRESS NOTES
Assessment and Plan:     Problem List Items Addressed This Visit        Endocrine    Hypothyroid       Other    Other insomnia    Menopausal state    Relevant Medications    Progesterone 100 MG CAPS      Other Visit Diagnoses     Welcome to Medicare preventive visit    -  Primary    Relevant Orders    POCT ECG    Postmenopausal        Relevant Orders    DXA bone density spine hip and pelvis    Encounter for osteoporosis screening in asymptomatic postmenopausal patient        Relevant Orders    DXA bone density spine hip and pelvis    Needs flu shot        Relevant Orders    influenza vaccine, high-dose, PF 0 7 mL (FLUZONE HIGH-DOSE)          Urinary Incontinence Plan of Care: counseling topics discussed: limiting fluid intake 3-4 hours before bed  Refills provided  DEXA ordered  Preventive health issues were discussed with patient, and age appropriate screening tests were ordered as noted in patient's After Visit Summary  Personalized health advice and appropriate referrals for health education or preventive services given if needed, as noted in patient's After Visit Summary       History of Present Illness:     Patient presents for a Medicare Wellness Visit    HPI   Patient Care Team:  Arben James as PCP - General (Family Medicine)  Jas Rodríguez DO as PCP - [address] [place] [address] (RTE)     Review of Systems:     Review of Systems     Problem List:     Patient Active Problem List   Diagnosis   • Hx of breast cancer   • Depression   • Hypothyroid   • Acute cystitis without hematuria   • Other insomnia   • Functional diarrhea   • Menopausal state   • Ganglion cyst   • Central cord syndrome [facility])      Past Medical and Surgical History:     Past Medical History:   Diagnosis Date   • Allergic 2002    Shellfish   • Anemia     Reoccuring   • Anxiety 2004    Secondary to PTSD   • Breast cancer [facility])    • Breast cancer ([facility] [address] )    • Cancer ([facility] [address] ) 20 years   • Depression    • Disease of thyroid gland 2010   • Headache(784 0)     Sporatic   • PTSD (post-traumatic stress disorder)      Past Surgical History:   Procedure Laterality Date   • APPENDECTOMY     • APPENDECTOMY      Burst surgery done open    • BREAST LUMPECTOMY Right    • BREAST SURGERY     • BREAST SURGERY      augmentation, rupture several times, breast cancer right side, lumpectomy, radiation, right side Lat/transfer mastectomy and skin graft    • FRACTURE SURGERY  5/9/2020    C6 fracture surgery with fushion   • LYMPH NODE BIOPSY  20+ years   • MASTECTOMY Bilateral     Breast ca-had reconstruction   • SPINE SURGERY  5/11/2020    See above   • TONSILLECTOMY        Family History:     Family History   Problem Relation Age of Onset   • Kidney disease Mother    • Osteoporosis Mother    • Other Mother         Kidney surgery    • Hypothyroidism Mother         other specified    • Arthritis Mother    • Hearing loss Mother    • No Known Problems Father    • No Known Problems Sister    • No Known Problems Brother    • Dementia Maternal Grandmother    • No Known Problems Maternal Grandfather    • No Known Problems Paternal Grandmother    • No Known Problems Paternal Grandfather       Social History:     Social History     Socioeconomic History   • Marital status: /Civil Union     Spouse name: None   • Number of children: None   • Years of education: None   • Highest education level: None   Occupational History   • None   Tobacco Use   • Smoking status: Former Smoker     Packs/day: 0 00     Years: 15 00     Pack years: 0 00     Types: Cigars   • Smokeless tobacco: Never Used   • Tobacco comment: Occasional cigar   Vaping Use   • Vaping Use: Never used   Substance and Sexual Activity   • Alcohol use:  Yes     Alcohol/week: 1 0 standard drink     Types: 1 Glasses of wine per week     Comment: Not currently drinking   • Drug use: No     Comment: Past 25 years ago    • Sexual activity: Yes     Partners: Male     Birth control/protection: Male Sterilization   Other Topics Concern   • None   Social History Narrative    ** Merged History Encounter **         Always uses seat belt   Daily caffeine consumption, 1 serving a day   Has smoke detectors        Social Determinants of Health     Financial Resource Strain: Low Risk    • Difficulty of Paying Living Expenses: Not very hard   Food Insecurity: Not on file   Transportation Needs: No Transportation Needs   • Lack of Transportation (Medical): No   • Lack of Transportation (Non-Medical): No   Physical Activity: Not on file   Stress: Not on file   Social Connections: Not on file   Intimate Partner Violence: Not on file   Housing Stability: Not on file      Medications and Allergies:     Current Outpatient Medications   Medication Sig Dispense Refill   • diazepam (VALIUM) 5 mg tablet TAKE 1 TABLET BY MOUTH EVERY 12 HOURS AS NEEDED FOR ANXIETY 60 tablet 0   • estradiol (CLIMARA) 0 025 mg/24 hr PLACE 1 PATCH ON THE SKIN ONCE A WEEK 12 patch 3   • gabapentin (NEURONTIN) 100 mg capsule Take 1 capsule in the morning and 2 capsule at bedtime 270 capsule 1   • levothyroxine 50 mcg tablet Take 1 tablet (50 mcg total) by mouth daily 90 tablet 1   • liothyronine (CYTOMEL) 5 mcg tablet Take 1 tablet (5 mcg total) by mouth daily 90 tablet 2   • Progesterone 100 MG CAPS Take 1 capsule by mouth 2 (two) times a day 180 capsule 3   • valACYclovir (VALTREX) 500 mg tablet Take 1 tablet (500 mg total) by mouth if needed (outbreak) 90 tablet 1     No current facility-administered medications for this visit       Allergies   Allergen Reactions   • Iodine Solution  [Povidone Iodine] Hives   • Latex Hives   • Other Allergic Rhinitis   • Shellfish-Derived Products - Food Allergy Hives   • Penicillins Rash and Hives      Immunizations:     Immunization History   Administered Date(s) Administered   • COVID-19 PFIZER VACCINE 0 3 ML IM 04/01/2021, 04/27/2021, 11/24/2021, 06/27/2022   • COVID-19 Pfizer vac (Bairon-sucrose, gray cap) 12 yr+ IM 06/27/2022   • INFLUENZA 09/28/2021   • Influenza Quadrivalent Preservative Free 3 years and older IM 09/01/2017   • Influenza, injectable, quadrivalent, preservative free 0 5 mL 08/13/2018, 08/29/2019   • Influenza, recombinant, quadrivalent,injectable, preservative free 10/27/2020   • Influenza, seasonal, injectable 10/14/2015   • Zoster Vaccine Recombinant 10/14/2019, 12/16/2019      Health Maintenance:         Topic Date Due   • HIV Screening  Never done   • Cervical Cancer Screening  Never done   • Colorectal Cancer Screening  02/03/2024   • Hepatitis C Screening  Completed         Topic Date Due   • Pneumococcal Vaccine: 65+ Years (1 - PCV) Never done   • Influenza Vaccine (1) 09/01/2022      Medicare Screening Tests and Risk Assessments:     Ana Laura Andrews is here for her Welcome to Medicare visit  Health Risk Assessment:   Patient rates overall health as good  Patient feels that their physical health rating is slightly worse  Patient is satisfied with their life  Eyesight was rated as slightly worse  Hearing was rated as same  Patient feels that their emotional and mental health rating is slightly worse  Patients states they are sometimes angry  Patient states they are sometimes unusually tired/fatigued  Pain experienced in the last 7 days has been some  Patient's pain rating has been 3/10  Patient states that she has experienced no weight loss or gain in last 6 months  Fall Risk Screening: In the past year, patient has experienced: no history of falling in past year      Urinary Incontinence Screening:   Patient has leaked urine accidently in the last six months  Home Safety:  Patient does not have trouble with stairs inside or outside of their home  Patient has working smoke alarms and has no working carbon monoxide detector  Home safety hazards include: poor household lighting  Nutrition:   Current diet is Low Carb and Limited junk food       Medications:   Patient is currently taking over-the-counter supplements  OTC medications include: Multivitamins  Patient is able to manage medications  Activities of Daily Living (ADLs)/Instrumental Activities of Daily Living (IADLs):   Walk and transfer into and out of bed and chair?: Yes  Dress and groom yourself?: Yes    Bathe or shower yourself?: Yes    Feed yourself? Yes  Do your laundry/housekeeping?: Yes  Manage your money, pay your bills and track your expenses?: Yes  Make your own meals?: Yes    Do your own shopping?: Yes    Previous Hospitalizations:   Any hospitalizations or ED visits within the last 12 months?: No      Advance Care Planning:   Living will: Yes    Durable POA for healthcare: Yes    Advanced directive: Yes    Advanced directive counseling given: No    Five wishes given: No    Patient declined ACP directive: No    End of Life Decisions reviewed with patient: Yes    Provider agrees with end of life decisions: Yes      Cognitive Screening:   Provider or family/friend/caregiver concerned regarding cognition?: No    PREVENTIVE SCREENINGS      Cardiovascular Screening:    General: Screening Current      Diabetes Screening:     General: Screening Current      Colorectal Cancer Screening:     General: Screening Current      Breast Cancer Screening:     General: Screening Not Indicated and History Breast Cancer      Cervical Cancer Screening:    General: Screening Not Indicated      Osteoporosis Screening:    General: Risks and Benefits Discussed    Due for: Bone Density Ultrasound      Abdominal Aortic Aneurysm (AAA) Screening:        General: Screening Not Indicated      Lung Cancer Screening:     General: Screening Not Indicated      Hepatitis C Screening:    General: Screening Current    Screening, Brief Intervention, and Referral to Treatment (SBIRT)    Screening  Typical number of drinks in a day: 1  Typical number of drinks in a week: 4  Interpretation: Low risk drinking behavior      AUDIT-C Screenin) How often did you have a drink containing alcohol in the past year? 2 to 3 times a week  2) How many drinks did you have on a typical day when you were drinking in the past year? 1 to 2  3) How often did you have 6 or more drinks on one occasion in the past year? never    AUDIT-C Score: 3  Interpretation: Score 3-12 (female): POSITIVE screen for alcohol misuse    AUDIT Screenin) How often during the last year have you found that you were not able to stop drinking once you had started? 0 - never  5) How often during the last year have you failed to do what was normally expected from you because of drinking? 0 - never  6) How often during the last year have you needed a first drink in the morning to get yourself going after a heavy drinking session? 0 - never  7) How often during the last year have you had a feeling of guilt or remorse after drinking? 0 - never  8) How often during the last year have you been unable to remember what happened the night before because you had been drinking? 0 - never  9) Have you or someone else been injured as a result of your drinking? 0 - no  10) Has a relative or friend or a doctor or another health worker been concerned about your drinking or suggested you cut down? 0 - no    AUDIT Score: 3  Interpretation: Low risk alcohol consumption    Single Item Drug Screening:  How often have you used an illegal drug (including marijuana) or a prescription medication for non-medical reasons in the past year? never    Single Item Drug Screen Score: 0  Interpretation: Negative screen for possible drug use disorder         Physical Exam:     /78 (BP Location: Right arm, Patient Position: Sitting, Cuff Size: Adult)   Pulse 83   Temp 97 6 °F (36 4 °C)   Ht 5' 4" (1 626 m)   Wt 49 9 kg (110 lb)   SpO2 98%   BMI 18 88 kg/m²     Physical Exam  Cardiovascular:      Rate and Rhythm: Normal rate and regular rhythm  Heart sounds: Normal heart sounds     Pulmonary:      Effort: Pulmonary effort is normal  Breath sounds: Normal breath sounds  Neurological:      Mental Status: She is alert and oriented to person, place, and time            Matt Kemp

## 2022-10-26 DIAGNOSIS — F43.10 POST TRAUMATIC STRESS DISORDER: ICD-10-CM

## 2022-10-26 DIAGNOSIS — F41.1 GENERALIZED ANXIETY DISORDER: ICD-10-CM

## 2022-10-26 RX ORDER — DIAZEPAM 5 MG/1
TABLET ORAL
Qty: 60 TABLET | Refills: 0 | Status: SHIPPED | OUTPATIENT
Start: 2022-10-26

## 2022-11-11 ENCOUNTER — HOSPITAL ENCOUNTER (OUTPATIENT)
Dept: RADIOLOGY | Facility: CLINIC | Age: 67
End: 2022-11-11

## 2022-11-11 DIAGNOSIS — Z13.820 ENCOUNTER FOR OSTEOPOROSIS SCREENING IN ASYMPTOMATIC POSTMENOPAUSAL PATIENT: ICD-10-CM

## 2022-11-11 DIAGNOSIS — Z78.0 POSTMENOPAUSAL: ICD-10-CM

## 2022-11-11 DIAGNOSIS — Z78.0 ENCOUNTER FOR OSTEOPOROSIS SCREENING IN ASYMPTOMATIC POSTMENOPAUSAL PATIENT: ICD-10-CM

## 2022-12-02 NOTE — TELEPHONE ENCOUNTER
Addended by: RADHA MELENDEZ on: 12/2/2022 12:51 PM     Modules accepted: Orders     Placed follow up call to patient from her 01/19/21 appt with Elliot Ibarra  Patient was referred to Neurosurgery, patient wanted to reach out to her [facility] doctor to see if she can get an appt there  Was she able to get an appt? Did patient set an appt up with GYN? Patient needs 4 month f/u appt scheduled with Elliot Ibarra

## 2022-12-06 DIAGNOSIS — M50.023 CERVICAL DISC DISORDER AT C6-C7 LEVEL WITH MYELOPATHY: ICD-10-CM

## 2022-12-06 DIAGNOSIS — G56.90 NEUROPATHY, ARM, UNSPECIFIED LATERALITY: ICD-10-CM

## 2022-12-06 RX ORDER — GABAPENTIN 100 MG/1
CAPSULE ORAL
Qty: 270 CAPSULE | Refills: 3 | Status: SHIPPED | OUTPATIENT
Start: 2022-12-06

## 2022-12-20 DIAGNOSIS — F41.1 GENERALIZED ANXIETY DISORDER: ICD-10-CM

## 2022-12-20 DIAGNOSIS — F43.10 POST TRAUMATIC STRESS DISORDER: ICD-10-CM

## 2022-12-21 RX ORDER — DIAZEPAM 5 MG/1
TABLET ORAL
Qty: 60 TABLET | Refills: 0 | Status: SHIPPED | OUTPATIENT
Start: 2022-12-21

## 2022-12-26 ENCOUNTER — OFFICE VISIT (OUTPATIENT)
Dept: URGENT CARE | Facility: CLINIC | Age: 67
End: 2022-12-26

## 2022-12-26 VITALS
HEART RATE: 89 BPM | BODY MASS INDEX: 17.42 KG/M2 | HEIGHT: 64 IN | SYSTOLIC BLOOD PRESSURE: 118 MMHG | TEMPERATURE: 98.4 F | WEIGHT: 102 LBS | RESPIRATION RATE: 16 BRPM | DIASTOLIC BLOOD PRESSURE: 80 MMHG | OXYGEN SATURATION: 97 %

## 2022-12-26 DIAGNOSIS — U07.1 COVID-19: Primary | ICD-10-CM

## 2022-12-26 RX ORDER — NIRMATRELVIR AND RITONAVIR 300-100 MG
3 KIT ORAL 2 TIMES DAILY
Qty: 30 TABLET | Refills: 0 | Status: SHIPPED | OUTPATIENT
Start: 2022-12-26 | End: 2022-12-31

## 2022-12-26 RX ORDER — BENZONATATE 200 MG/1
200 CAPSULE ORAL 3 TIMES DAILY PRN
Qty: 20 CAPSULE | Refills: 0 | Status: SHIPPED | OUTPATIENT
Start: 2022-12-26 | End: 2023-01-09

## 2022-12-26 NOTE — PROGRESS NOTES
[facility]        NAME: Jono Rios is a 74 y o  female  : 1955    MRN: [id number]  DATE: 2022  TIME: 2:33 PM    Assessment and Plan   COVID-19 [U07 1]  1  COVID-19  nirmatrelvir & ritonavir (Paxlovid, 300/100,) tablet therapy pack    benzonatate (TESSALON) 200 MG capsule            Patient Instructions   Today you tested POSITIVE for COVID-19  You may print results from your [facility] (IT help [phone] option 5) or call medical records at [phone]  [facility] quarantine  Department of health's newest recommendations as of  state the following:    IF you TEST POSITIVE for COVID-19  -stay home for 5 days  If you have no symptoms or your symptoms are resolving after 5 days, you can leave your house  Continue to wear a mask around others for 5 additional days  If you have a fever, continue to stay home until you fever resolves  IF YOU WERE EXPOSED TO SOMEONE WITH COVID 19  -if you have been boosted OR completed the primary series of Pfizer or Moderna vaccine within the last 6 months OR completed the primary series of J&J vaccine within the last 2 months, wear a mask around others for 10 days  Get tested on day 5 if possible  If you develop symptoms, get a test and stay home     -if you completed the primary series of Pfizer or Moderna vaccine over 6 months ago and are NOT boosted OR completed the primary series of J&J over 2 months ago and are NOT boosted OR are unvaccinated, stay home for 5 days  After that continue to wear a mask around others for 5 additional days  If you can not quarantine you must wear a mask for 10 days  Test on day 5 if possible  If you develops symptoms get a test and stay home  Drink lots of fluids to maintain hydration  Do not touch your face, wash hands often, and practice social distancing     There is no treatment for simple outpatient COVID-19 patients however, CDC recommends 2000 units vitamin D3 to boost the immune system  Those with severe illness, older age, or multiple comorbidities may qualify for monoclonal antibody infusions as treatment  The FDA has approved some other medications for treatment of COVID-19  If you test positive for COVID-19, please call your doctor ASAP to see if you qualify  Call your family doctor to have a follow-up appointment in next few days  Go to ER if you began experiencing chest pain, shortness of breath, fever that is not responding to antipyretics or other severe symptoms  Follow up with PCP in 3-5 days  Proceed to  ER if symptoms worsen  Chief Complaint     Chief Complaint   Patient presents with   • COVID Symptoms     Cough, body aches, fevers, sore throat, and headaches; symptoms started yesterday; at home COVID test YESTERDAY results were POSITIVE; pt requesting medications for COVID         History of Present Illness       Patient is a 43-year-old female with significant past medical history of breast cancer and hypothyroidism presents the office complaining of fatigue, body aches, fevers and chills, congestion, rhinorrhea, sore throat, and cough since yesterday  She denies SOB, CP, vomiting, abdominal pain or rashes  At home COVID test positive  She would like to be treated with COVID-19 medications  Review of Systems   Review of Systems   Constitutional: Positive for chills, fatigue and fever  HENT: Positive for congestion, postnasal drip, rhinorrhea and sore throat  Respiratory: Positive for cough  Negative for shortness of breath  Cardiovascular: Negative for chest pain and palpitations  Gastrointestinal: Positive for nausea  Negative for abdominal pain, diarrhea and vomiting  Musculoskeletal: Positive for myalgias  Skin: Negative for rash  Neurological: Positive for headaches  Negative for dizziness and light-headedness           Current Medications       Current Outpatient Medications:   •  benzonatate (TESSALON) 200 MG capsule, Take 1 capsule (200 mg total) by mouth 3 (three) times a day as needed for cough for up to 14 days, Disp: 20 capsule, Rfl: 0  •  diazepam (VALIUM) 5 mg tablet, TAKE 1 TABLET BY MOUTH EVERY 12 HOURS AS NEEDED FOR ANXIETY, Disp: 60 tablet, Rfl: 0  •  estradiol (CLIMARA) 0 025 mg/24 hr, PLACE 1 PATCH ON THE SKIN ONCE A WEEK, Disp: 12 patch, Rfl: 3  •  gabapentin (NEURONTIN) 100 mg capsule, TAKE 1 CAPSULE IN THE MORNING AND 2 CAPSULES AT BEDTIME, Disp: 270 capsule, Rfl: 3  •  levothyroxine 50 mcg tablet, Take 1 tablet (50 mcg total) by mouth daily, Disp: 90 tablet, Rfl: 1  •  liothyronine (CYTOMEL) 5 mcg tablet, Take 1 tablet (5 mcg total) by mouth daily, Disp: 90 tablet, Rfl: 2  •  nirmatrelvir & ritonavir (Paxlovid, 300/100,) tablet therapy pack, Take 3 tablets by mouth 2 (two) times a day for 5 days Take 2 nirmatrelvir tablets + 1 ritonavir tablet together per dose, Disp: 30 tablet, Rfl: 0  •  Progesterone 100 MG CAPS, Take 1 capsule by mouth 2 (two) times a day, Disp: 180 capsule, Rfl: 3  •  valACYclovir (VALTREX) 500 mg tablet, Take 1 tablet (500 mg total) by mouth if needed (outbreak), Disp: 90 tablet, Rfl: 1    Current Allergies     Allergies as of 12/26/2022 - Reviewed 12/26/2022   Allergen Reaction Noted   • Iodine solution  [povidone iodine] Hives 10/20/2017   • Latex Hives    • Other Allergic Rhinitis 10/20/2017   • Shellfish-derived products - food allergy Hives 02/15/2018   • Penicillins Rash and Hives 07/27/2016            The following portions of the patient's history were reviewed and updated as appropriate: allergies, current medications, past family history, past medical history, past social history, past surgical history and problem list      Past Medical History:   Diagnosis Date   • Allergic 2002    Shellfish   • Anemia     Reoccuring   • Anxiety 2004    Secondary to PTSD   • Breast cancer ([facility] [address] )    • Breast cancer ([facility] [address] )    • Cancer ([facility] [address] ) 20 years   • Depression    • Disease of thyroid gland 2010   • Headache(784 0)     Sporatic   • PTSD (post-traumatic stress disorder)        Past Surgical History:   Procedure Laterality Date   • APPENDECTOMY     • APPENDECTOMY      Burst surgery done open    • BREAST LUMPECTOMY Right    • BREAST SURGERY     • BREAST SURGERY      augmentation, rupture several times, breast cancer right side, lumpectomy, radiation, right side Lat/transfer mastectomy and skin graft    • FRACTURE SURGERY  5/9/2020    C6 fracture surgery with fushion   • LYMPH NODE BIOPSY  20+ years   • MASTECTOMY Bilateral     Breast ca-had reconstruction   • SPINE SURGERY  5/11/2020    See above   • TONSILLECTOMY         Family History   Problem Relation Age of Onset   • Kidney disease Mother    • Osteoporosis Mother    • Other Mother         Kidney surgery    • Hypothyroidism Mother         other specified    • Arthritis Mother    • Hearing loss Mother    • No Known Problems Father    • No Known Problems Sister    • No Known Problems Brother    • Dementia Maternal Grandmother    • No Known Problems Maternal Grandfather    • No Known Problems Paternal Grandmother    • No Known Problems Paternal Grandfather          Medications have been verified  Objective   /80   Pulse 89   Temp 98 4 °F (36 9 °C)   Resp 16   Ht 5' 4" (1 626 m)   Wt 46 3 kg (102 lb)   SpO2 97%   BMI 17 51 kg/m²   No LMP recorded  Patient is postmenopausal        Physical Exam     Physical Exam  Vitals and nursing note reviewed  Constitutional:       Appearance: Normal appearance  She is well-developed  HENT:      Head: Normocephalic and atraumatic  Right Ear: Tympanic membrane, ear canal and external ear normal       Left Ear: Tympanic membrane, ear canal and external ear normal       Nose: Congestion present  Mouth/Throat:      Pharynx: Uvula midline        Comments: Raspy voice  Eyes:      General: Lids are normal       Conjunctiva/sclera: Conjunctivae normal       Pupils: Pupils are equal, round, and reactive to light  Cardiovascular:      Rate and Rhythm: Normal rate and regular rhythm  Pulses: Normal pulses  Heart sounds: Normal heart sounds  No murmur heard  No friction rub  No gallop  Pulmonary:      Effort: Pulmonary effort is normal       Breath sounds: Normal breath sounds  No wheezing, rhonchi or rales  Musculoskeletal:         General: Normal range of motion  Cervical back: Neck supple  Lymphadenopathy:      Cervical: No cervical adenopathy  Skin:     General: Skin is warm and dry  Capillary Refill: Capillary refill takes less than 2 seconds  Neurological:      Mental Status: She is alert

## 2022-12-26 NOTE — PATIENT INSTRUCTIONS
Today you tested POSITIVE for COVID-19  You may print results from your MyChart (IT help [phone] option 5) or call medical records at [phone]  Self quarantine  Department of health's newest recommendations as of December 27,2021 state the following:    IF you TEST POSITIVE for COVID-19  -stay home for 5 days  If you have no symptoms or your symptoms are resolving after 5 days, you can leave your house  Continue to wear a mask around others for 5 additional days  If you have a fever, continue to stay home until you fever resolves  IF YOU WERE EXPOSED TO SOMEONE WITH COVID 19  -if you have been boosted OR completed the primary series of Pfizer or Moderna vaccine within the last 6 months OR completed the primary series of J&J vaccine within the last 2 months, wear a mask around others for 10 days  Get tested on day 5 if possible  If you develop symptoms, get a test and stay home     -if you completed the primary series of Pfizer or Moderna vaccine over 6 months ago and are NOT boosted OR completed the primary series of J&J over 2 months ago and are NOT boosted OR are unvaccinated, stay home for 5 days  After that continue to wear a mask around others for 5 additional days  If you can not quarantine you must wear a mask for 10 days  Test on day 5 if possible  If you develops symptoms get a test and stay home  Drink lots of fluids to maintain hydration  Do not touch your face, wash hands often, and practice social distancing  There is no treatment for simple outpatient COVID-19 patients however, CDC recommends 2000 units vitamin D3 to boost the immune system  Those with severe illness, older age, or multiple comorbidities may qualify for monoclonal antibody infusions as treatment  The FDA has approved some other medications for treatment of COVID-19  If you test positive for COVID-19, please call your doctor ASAP to see if you qualify    Call your family doctor to have a follow-up appointment in next few days  Go to ER if you began experiencing chest pain, shortness of breath, fever that is not responding to antipyretics or other severe symptoms

## 2022-12-30 ENCOUNTER — TELEMEDICINE (OUTPATIENT)
Dept: FAMILY MEDICINE CLINIC | Facility: CLINIC | Age: 67
End: 2022-12-30

## 2022-12-30 VITALS — WEIGHT: 102 LBS | HEIGHT: 64 IN | BODY MASS INDEX: 17.42 KG/M2 | TEMPERATURE: 95.2 F

## 2022-12-30 DIAGNOSIS — R05.1 ACUTE COUGH: ICD-10-CM

## 2022-12-30 DIAGNOSIS — U07.1 COVID-19: Primary | ICD-10-CM

## 2022-12-30 DIAGNOSIS — R07.0 THROAT PAIN: ICD-10-CM

## 2022-12-30 NOTE — PROGRESS NOTES
COVID-19 Outpatient Progress Note    Assessment/Plan:    Problem List Items Addressed This Visit    None  Visit Diagnoses     COVID-19    -  Primary    Throat pain        Acute cough             Disposition:     Patient has asymptomatic or mild COVID-19 infection  Based off CDC guidelines, they were recommended to isolate for 5 days  If they are asymptomatic or symptoms are improving with no fevers in the past 24 hours, isolation may be ended followed by 5 days of wearing a mask when around othes to minimize risk of infecting others  If still have a fever or other symptoms have not improved, continue to isolate until they improve  Regardless of when they end isolation, avoid being around people who are more likely to get very sick from COVID-19 until at least day 11  May return to work on 01/02/2023    I have spent 8 minutes directly with the patient  Greater than 50% of this time was spent in counseling/coordination of care regarding: prognosis, instructions for management and impressions  Encounter provider: SHIRA Snyder     Provider located at: [address] [place] [id number]     Recent Visits  No visits were found meeting these conditions  Showing recent visits within past 7 days and meeting all other requirements  Today's Visits  Date Type Provider Dept   12/30/22 Telemedicine [facility]   Showing today's visits and meeting all other requirements  Future Appointments  No visits were found meeting these conditions  Showing future appointments within next 150 days and meeting all other requirements     This virtual check-in was done via [facility] and patient was informed that this is a secure, HIPAA-compliant platform  She agrees to proceed      Patient agrees to participate in a virtual check in via telephone or video visit instead of presenting to the office to address urgent/immediate medical needs  Patient is aware this is a billable service  She acknowledged consent and understanding of privacy and security of the video platform  The patient has agreed to participate and understands they can discontinue the visit at any time  After connecting through [facility], the patient was identified by name and date of birth  Makayla Washburn was informed that this was a telemedicine visit and that the exam was being conducted confidentially over secure lines  Makayla Washburn acknowledged consent and understanding of privacy and security of the telemedicine visit  I informed the patient that I have reviewed her record in Epic and presented the opportunity for her to ask any questions regarding the visit today  The patient agreed to participate  Verification of patient location:  Patient is located in the following state in which I hold an active license: [place]    Subjective:   Makayla Washburn is a 74 y o  female who has been screened for COVID-19  Symptom change since last report: resolving  Patient's symptoms include sore throat and cough  - Date of symptom onset: 12/25/2022  - Date of positive COVID-19 test: 12/25/2022  Type of test: Home antigen  Patient with typical symptoms of COVID-19 and they attest that they were positive on home rapid antigen testing  Image of positive result is not able to be uploaded into their chart  COVID-19 vaccination status: Fully vaccinated with booster    [facility] has been staying home and has isolated themselves in her home  She is taking care to not share personal items and is cleaning all surfaces that are touched often, like counters, tabletops, and doorknobs using household cleaning sprays or wipes  She is wearing a mask when she leaves her room  Lab Results   Component Value Date    SARSCOV2 Detected (A) 12/01/2020    [facility] [facility] [address] Not Detected 06/03/2020       Review of Systems   HENT: Positive for sore throat  Respiratory: Positive for cough  Current Outpatient Medications on File Prior to Visit   Medication Sig   • benzonatate (TESSALON) 200 MG capsule Take 1 capsule (200 mg total) by mouth 3 (three) times a day as needed for cough for up to 14 days   • diazepam (VALIUM) 5 mg tablet TAKE 1 TABLET BY MOUTH EVERY 12 HOURS AS NEEDED FOR ANXIETY   • estradiol (CLIMARA) 0 025 mg/24 hr PLACE 1 PATCH ON THE SKIN ONCE A WEEK   • gabapentin (NEURONTIN) 100 mg capsule TAKE 1 CAPSULE IN THE MORNING AND 2 CAPSULES AT BEDTIME   • levothyroxine 50 mcg tablet Take 1 tablet (50 mcg total) by mouth daily   • liothyronine (CYTOMEL) 5 mcg tablet Take 1 tablet (5 mcg total) by mouth daily   • nirmatrelvir & ritonavir (Paxlovid, 300/100,) tablet therapy pack Take 3 tablets by mouth 2 (two) times a day for 5 days Take 2 nirmatrelvir tablets + 1 ritonavir tablet together per dose   • Progesterone 100 MG CAPS Take 1 capsule by mouth 2 (two) times a day   • valACYclovir (VALTREX) 500 mg tablet Take 1 tablet (500 mg total) by mouth if needed (outbreak)       Objective:    Temp (!) 95 2 °F (35 1 °C)   Ht 5' 4" (1 626 m)   Wt 46 3 kg (102 lb)   BMI 17 51 kg/m²      Physical Exam  Pulmonary:      Effort: Pulmonary effort is normal    Neurological:      Mental Status: She is alert and oriented to person, place, and time         Rocio Freeman

## 2022-12-30 NOTE — LETTER
December 30, 2022     Patient: Laura Mccormick  YOB: 1955  Date of Visit: 12/30/2022      To Whom it May Concern:    Laura Mccormick is under my professional care  Chapincito Sheehan was seen on 12/30/2022  Chapincito Sheehan was recently diagnosed with COVID 19 on 12/25/2022  She will be finished with her quarantine on 01/01/2023 and may return to work on 01/02/2023  If you have any questions or concerns, please don't hesitate to call           Sincerely,          Pastor Jiménez ER DISCHARGE NOTE:



Patient is cleared to be discharged per ERMD, pt is aox4, on room air, with 
stable vital signs. pt was given dc and prescription instructions, pt was able 
to verbalize understanding, pt id band removed. pt is able to ambulate with 
steady gait. pt took all belongings.

## 2023-01-19 DIAGNOSIS — N95.1 MENOPAUSAL STATE: ICD-10-CM

## 2023-03-06 DIAGNOSIS — F43.10 POST TRAUMATIC STRESS DISORDER: ICD-10-CM

## 2023-03-06 DIAGNOSIS — F41.1 GENERALIZED ANXIETY DISORDER: ICD-10-CM

## 2023-03-07 RX ORDER — DIAZEPAM 5 MG/1
TABLET ORAL
Qty: 60 TABLET | Refills: 0 | Status: SHIPPED | OUTPATIENT
Start: 2023-03-07

## 2023-04-25 ENCOUNTER — OFFICE VISIT (OUTPATIENT)
Dept: FAMILY MEDICINE CLINIC | Facility: CLINIC | Age: 68
End: 2023-04-25

## 2023-04-25 VITALS
HEIGHT: 64 IN | WEIGHT: 108 LBS | BODY MASS INDEX: 18.44 KG/M2 | SYSTOLIC BLOOD PRESSURE: 132 MMHG | OXYGEN SATURATION: 97 % | DIASTOLIC BLOOD PRESSURE: 86 MMHG | TEMPERATURE: 97.6 F | HEART RATE: 86 BPM

## 2023-04-25 DIAGNOSIS — S14.129D CENTRAL CORD SYNDROME, SUBSEQUENT ENCOUNTER (HCC): ICD-10-CM

## 2023-04-25 DIAGNOSIS — M67.461 GANGLION, RIGHT KNEE: Primary | ICD-10-CM

## 2023-04-25 DIAGNOSIS — Z00.00 WELLNESS EXAMINATION: ICD-10-CM

## 2023-04-25 DIAGNOSIS — N95.1 MENOPAUSAL STATE: ICD-10-CM

## 2023-04-25 DIAGNOSIS — E03.9 ACQUIRED HYPOTHYROIDISM: ICD-10-CM

## 2023-04-25 PROBLEM — N30.00 ACUTE CYSTITIS WITHOUT HEMATURIA: Status: RESOLVED | Noted: 2018-02-19 | Resolved: 2023-04-25

## 2023-04-25 PROBLEM — M67.40 GANGLION CYST: Status: RESOLVED | Noted: 2019-06-14 | Resolved: 2023-04-25

## 2023-04-25 NOTE — PROGRESS NOTES
Name: Anabela Player      : 1955      MRN: [id number]  Encounter Provider: Ronelle Lundborg, CRNP  Encounter Date: 2023   Encounter department: [address] [place] [address] PRIMARY CARE    Assessment & Plan     1  Ganglion, right knee  Assessment & Plan:  Reviewed treatment options - she does not want to proceed with surgery - will monitor  2  Central cord syndrome, subsequent encounter [facility])  Assessment & Plan:  Color and temp change to right fourth finger possibly due to nerve compression  3  Acquired hypothyroidism  Assessment & Plan:  TSH ordered  Orders:  -     TSH, 3rd generation; Future    4  Menopausal state  -     Estrogens, total; Future  -     Progesterone; Future    5  Wellness examination  -     Comprehensive metabolic panel; Future       Labs ordered  Will look into [facility] for GYN appt  Subjective      Here today for a follow-up  She is with a hx of hypothyroidism - on levothyroxine - is due for a TSH check  Hx of right knee ganglion cyst - notes it increases in size and then decreases with massage  Reports she is doing well since stopping her wellbutrin  Due for a pap smear - aware she needs to make an appointment  States that fourth finger of right hand changes color and becomes cold at times - hx of spinal cord compression in the past from a cervical spine injury post fall  Review of Systems   Constitutional: Negative  HENT: Negative  Respiratory: Negative  Cardiovascular: Negative  Gastrointestinal: Negative  Musculoskeletal:        See HPI   Neurological: Negative  See HPI   All other systems reviewed and are negative        Current Outpatient Medications on File Prior to Visit   Medication Sig   • diazepam (VALIUM) 5 mg tablet TAKE 1 TABLET BY MOUTH EVERY 12 HOURS AS NEEDED FOR ANXIETY   • estradiol (CLIMARA) 0 025 mg/24 hr Place 1 patch on the skin over 7 days once a week   • gabapentin (NEURONTIN) 100 mg "capsule TAKE 1 CAPSULE IN THE MORNING AND 2 CAPSULES AT BEDTIME   • levothyroxine 50 mcg tablet Take 1 tablet (50 mcg total) by mouth daily   • liothyronine (CYTOMEL) 5 mcg tablet Take 1 tablet (5 mcg total) by mouth daily   • Progesterone 100 MG CAPS Take 1 capsule by mouth 2 (two) times a day   • valACYclovir (VALTREX) 500 mg tablet Take 1 tablet (500 mg total) by mouth if needed (outbreak)       Objective     /86 (BP Location: Left arm, Patient Position: Sitting, Cuff Size: Adult)   Pulse 86   Temp 97 6 °F (36 4 °C)   Ht 5' 4\" (1 626 m)   Wt 49 kg (108 lb)   SpO2 97%   BMI 18 54 kg/m²     Physical Exam  Constitutional:       Appearance: Normal appearance  Cardiovascular:      Rate and Rhythm: Normal rate and regular rhythm  Pulmonary:      Effort: Pulmonary effort is normal       Breath sounds: Normal breath sounds  Musculoskeletal:      Comments: Small mass on outer lateral right knee - moveable, soft when patient palpates it  Neurological:      Mental Status: She is alert  Psychiatric:         Mood and Affect: Mood normal          Behavior: Behavior normal          Thought Content:  Thought content normal          Judgment: Judgment normal        Nolberto Ranks, CRNP  "

## 2023-05-04 ENCOUNTER — APPOINTMENT (OUTPATIENT)
Dept: LAB | Facility: CLINIC | Age: 68
End: 2023-05-04

## 2023-05-04 DIAGNOSIS — N95.1 MENOPAUSAL STATE: ICD-10-CM

## 2023-05-04 DIAGNOSIS — Z00.00 WELLNESS EXAMINATION: ICD-10-CM

## 2023-05-04 DIAGNOSIS — E03.9 ACQUIRED HYPOTHYROIDISM: ICD-10-CM

## 2023-05-04 LAB
ALBUMIN SERPL BCP-MCNC: 4.5 G/DL (ref 3.5–5)
ALP SERPL-CCNC: 82 U/L (ref 46–116)
ALT SERPL W P-5'-P-CCNC: 15 U/L (ref 12–78)
ANION GAP SERPL CALCULATED.3IONS-SCNC: 4 MMOL/L (ref 4–13)
AST SERPL W P-5'-P-CCNC: 17 U/L (ref 5–45)
BILIRUB SERPL-MCNC: 0.48 MG/DL (ref 0.2–1)
BUN SERPL-MCNC: 15 MG/DL (ref 5–25)
CALCIUM SERPL-MCNC: 9.3 MG/DL (ref 8.3–10.1)
CHLORIDE SERPL-SCNC: 104 MMOL/L (ref 96–108)
CO2 SERPL-SCNC: 28 MMOL/L (ref 21–32)
CREAT SERPL-MCNC: 0.76 MG/DL (ref 0.6–1.3)
GFR SERPL CREATININE-BSD FRML MDRD: 82 ML/MIN/1.73SQ M
GLUCOSE P FAST SERPL-MCNC: 88 MG/DL (ref 65–99)
POTASSIUM SERPL-SCNC: 4.2 MMOL/L (ref 3.5–5.3)
PROGEST SERPL-MCNC: 80.6 NG/ML
PROT SERPL-MCNC: 7.9 G/DL (ref 6.4–8.4)
SODIUM SERPL-SCNC: 136 MMOL/L (ref 135–147)
TSH SERPL DL<=0.05 MIU/L-ACNC: 1.06 UIU/ML (ref 0.45–4.5)

## 2023-05-09 LAB — ESTROGEN SERPL-MCNC: 110 PG/ML (ref 40–244)

## 2023-05-30 DIAGNOSIS — F41.1 GENERALIZED ANXIETY DISORDER: ICD-10-CM

## 2023-05-30 DIAGNOSIS — F43.10 POST TRAUMATIC STRESS DISORDER: ICD-10-CM

## 2023-05-30 RX ORDER — DIAZEPAM 5 MG/1
TABLET ORAL
Qty: 60 TABLET | Refills: 0 | Status: SHIPPED | OUTPATIENT
Start: 2023-05-30

## 2023-08-09 DIAGNOSIS — F41.1 GENERALIZED ANXIETY DISORDER: ICD-10-CM

## 2023-08-09 DIAGNOSIS — F43.10 POST TRAUMATIC STRESS DISORDER: ICD-10-CM

## 2023-08-10 RX ORDER — DIAZEPAM 5 MG/1
TABLET ORAL
Qty: 60 TABLET | Refills: 0 | Status: SHIPPED | OUTPATIENT
Start: 2023-08-10

## 2023-10-05 DIAGNOSIS — N95.1 MENOPAUSAL STATE: ICD-10-CM

## 2023-10-06 RX ORDER — PROGESTERONE 100 MG/1
1 CAPSULE ORAL 2 TIMES DAILY
Qty: 180 CAPSULE | Refills: 3 | Status: SHIPPED | OUTPATIENT
Start: 2023-10-06

## 2023-10-20 ENCOUNTER — RA CDI HCC (OUTPATIENT)
Dept: OTHER | Facility: HOSPITAL | Age: 68
End: 2023-10-20

## 2023-10-20 NOTE — PROGRESS NOTES
[address] [facility] coding opportunities       Chart reviewed, no opportunity found: [address]        Patients Insurance     Medicare Insurance: [employer]